# Patient Record
Sex: FEMALE | Race: WHITE | NOT HISPANIC OR LATINO | Employment: FULL TIME | URBAN - NONMETROPOLITAN AREA
[De-identification: names, ages, dates, MRNs, and addresses within clinical notes are randomized per-mention and may not be internally consistent; named-entity substitution may affect disease eponyms.]

---

## 2017-02-21 ENCOUNTER — OFFICE VISIT (OUTPATIENT)
Dept: OBGYN | Facility: OTHER | Age: 27
End: 2017-02-21
Attending: NURSE PRACTITIONER
Payer: COMMERCIAL

## 2017-02-21 VITALS
WEIGHT: 137 LBS | HEIGHT: 65 IN | DIASTOLIC BLOOD PRESSURE: 66 MMHG | HEART RATE: 88 BPM | OXYGEN SATURATION: 97 % | SYSTOLIC BLOOD PRESSURE: 114 MMHG | BODY MASS INDEX: 22.82 KG/M2

## 2017-02-21 DIAGNOSIS — Z30.42 ENCOUNTER FOR DEPO-PROVERA CONTRACEPTION: ICD-10-CM

## 2017-02-21 DIAGNOSIS — Z30.019 ENCOUNTER FOR INITIAL PRESCRIPTION OF CONTRACEPTIVES: ICD-10-CM

## 2017-02-21 DIAGNOSIS — Z30.019 ENCOUNTER FOR INITIAL PRESCRIPTION OF CONTRACEPTIVES: Primary | ICD-10-CM

## 2017-02-21 LAB — HCG UR QL: NEGATIVE

## 2017-02-21 PROCEDURE — 99212 OFFICE O/P EST SF 10 MIN: CPT | Performed by: COUNSELOR

## 2017-02-21 PROCEDURE — 81025 URINE PREGNANCY TEST: CPT | Performed by: NURSE PRACTITIONER

## 2017-02-21 PROCEDURE — 99212 OFFICE O/P EST SF 10 MIN: CPT | Performed by: NURSE PRACTITIONER

## 2017-02-21 RX ORDER — MEDROXYPROGESTERONE ACETATE 150 MG/ML
150 INJECTION, SUSPENSION INTRAMUSCULAR
Qty: 3 ML | Refills: 4 | OUTPATIENT
Start: 2017-02-21 | End: 2017-09-27

## 2017-02-21 ASSESSMENT — PAIN SCALES - GENERAL: PAINLEVEL: NO PAIN (0)

## 2017-02-21 NOTE — MR AVS SNAPSHOT
"              After Visit Summary   2/21/2017    Latasha Fisher    MRN: [id number]           Patient Information     Date Of Birth          1990        Visit Information        Provider Department      2/21/2017 10:15 AM Latosha Kathleen NP [facility]        Today's Diagnoses     Encounter for initial prescription of contraceptives    -  1    Encounter for Depo-Provera contraception          Care Instructions    Return in 90 days        Follow-ups after your visit        Follow-up notes from your care team     Return in about 3 months (around 5/21/2017).      Who to contact     If you have questions or need follow up information about today's clinic visit or your schedule please contact [facility] directly at [phone].  Normal or non-critical lab and imaging results will be communicated to you by MyChart, letter or phone within 4 business days after the clinic has received the results. If you do not hear from us within 7 days, please contact the clinic through MyChart or phone. If you have a critical or abnormal lab result, we will notify you by phone as soon as possible.  Submit refill requests through [employer] or call your pharmacy and they will forward the refill request to us. Please allow 3 business days for your refill to be completed.          Additional Information About Your Visit        MyChart Information     [employer] lets you send messages to your doctor, view your test results, renew your prescriptions, schedule appointments and more. To sign up, go to [email or web address] . Click on \"Log in\" on the left side of the screen, which will take you to the Welcome page. Then click on \"Sign up Now\" on the right side of the page.     You will be asked to enter the access code listed below, as well as some personal information. Please follow the directions to create your username and password.     Your access code is: [email or web address]  Expires: 5/22/2017 10:44 AM     Your access " "code will  in 90 days. If you need help or a new code, please call your [place] clinic or [phone].        Care EveryWhere ID     This is your Care EveryWhere ID. This could be used by other organizations to access your [place] medical records  [id number]        Your Vitals Were     Pulse Height Last Period Pulse Oximetry BMI (Body Mass Index)       88 5' 5\" (1.651 m) 2017 97% 22.8 kg/m2        Blood Pressure from Last 3 Encounters:   17 114/66   16 114/70   16 102/70    Weight from Last 3 Encounters:   17 137 lb (62.1 kg)   16 137 lb (62.1 kg)   16 130 lb (59 kg)                 Today's Medication Changes          These changes are accurate as of: 17 10:44 AM.  If you have any questions, ask your nurse or doctor.               Start taking these medicines.        Dose/Directions    medroxyPROGESTERone 150 MG/ML injection   Commonly known as:  DEPO-PROVERA   Used for:  Encounter for initial prescription of contraceptives   Started by:  Latosha Kathleen, NP        Dose:  150 mg   Inject 1 mL (150 mg) into the muscle every 3 months   Quantity:  3 mL   Refills:  4            Where to get your medicines      Some of these will need a paper prescription and others can be bought over the counter.  Ask your nurse if you have questions.     You don't need a prescription for these medications     medroxyPROGESTERone 150 MG/ML injection                Primary Care Provider Office Phone # Fax #    Maximus Kemp [phone]       [address] [address]        Thank you!     Thank you for choosing [facility]  for your care. Our goal is always to provide you with excellent care. Hearing back from our patients is one way we can continue to improve our services. Please take a few minutes to complete the written survey that you may receive in the mail after your visit with us. Thank you!             Your " Updated Medication List - Protect others around you: Learn how to safely use, store and throw away your medicines at www.disposemymeds.org.          This list is accurate as of: 2/21/17 10:44 AM.  Always use your most recent med list.                   Brand Name Dispense Instructions for use    albuterol 108 (90 BASE) MCG/ACT Inhaler    VENTOLIN HFA    1 Inhaler    Inhale 2 puffs into the lungs every 4 hours as needed for shortness of breath / dyspnea or wheezing INHALE TWO PUFFS INTO LUNGS EVERY 4 HOURS AS NEEDED FOR SHORTNESS OF BREATH, SHAKE BEFORE USING       medroxyPROGESTERone 150 MG/ML injection    DEPO-PROVERA    3 mL    Inject 1 mL (150 mg) into the muscle every 3 months

## 2017-02-21 NOTE — NURSING NOTE
The following medication was given:     MEDICATION: Depo Provera 150mg  ROUTE: IM  SITE: Deltoid - Right  DOSE: 1 mL  LOT #: [id number]  :  [employer]   EXPIRATION DATE:  7/31/2019  NDC#: [address]-8  Patient will return to the clinic for another injection between 5/9/2017-5/23/2017.    Amparo Espitia

## 2017-02-21 NOTE — PROGRESS NOTES
"[facility]   Latasha presents today requesting to go back on Depo provera shots.  She has a 13 month old juan miguel dn has been using condoms but would like something more long term.  Her urine hcg is negative today. She has used Depo in the past with good results.              Medications:     Current Outpatient Prescriptions Ordered in Epic   Medication     medroxyPROGESTERone (DEPO-PROVERA) 150 MG/ML injection     albuterol (VENTOLIN HFA) 108 (90 BASE) MCG/ACT inhaler     No current Epic-ordered facility-administered medications on file.                 Allergies:   Review of patient's allergies indicates no known allergies.         Review of Systems:   The 5 point Review of Systems is negative other than noted in the HPI                     Physical Exam:   Blood pressure 114/66, pulse 88, height 5' 5\" (1.651 m), weight 137 lb (62.1 kg), last menstrual period 01/28/2017, SpO2 97 %, not currently breastfeeding.  Constitutional:   awake, alert, cooperative, no apparent distress, and appears stated age              Data:   All laboratory data reviewed          Assessment and Plan:   Contraceptive initiation - She desired Depo-Provera and this was started today following negative urine Hcg.  Bleeding/fertility and bone loss side effects were reviewed, questions answered.  Instructed to return every 90 days for continued injections.  She is to call with any questions or concerns.      BRITNI Robison  2/21/2017  10:38 AM  "

## 2017-02-21 NOTE — NURSING NOTE
"Chief Complaint   Patient presents with     Contraception       Initial /66  Pulse 88  Ht 5' 5\" (1.651 m)  Wt 137 lb (62.1 kg)  LMP 01/28/2017  SpO2 97%  BMI 22.8 kg/m2 Estimated body mass index is 22.8 kg/(m^2) as calculated from the following:    Height as of this encounter: 5' 5\" (1.651 m).    Weight as of this encounter: 137 lb (62.1 kg).  Medication Reconciliation: complete     Amparo Espitia      "

## 2017-05-19 ENCOUNTER — ALLIED HEALTH/NURSE VISIT (OUTPATIENT)
Dept: ALLERGY | Facility: OTHER | Age: 27
End: 2017-05-19
Attending: FAMILY MEDICINE
Payer: MEDICAID

## 2017-05-19 DIAGNOSIS — Z30.9 CONTRACEPTIVE MANAGEMENT: Primary | ICD-10-CM

## 2017-05-19 PROCEDURE — 96372 THER/PROPH/DIAG INJ SC/IM: CPT

## 2017-05-19 NOTE — PROGRESS NOTES
Prior to injection verified patient identity using patient's name and date of birth.    Per orders of Dr Kemp, injection of Depo Provera given by Brenda Barahona. Patient instructed to remain in clinic for 20 minutes afterwards, and to report any adverse reaction to me immediately.    Patient signed out AMA and did not remain for observation.     The following medication was given:     MEDICATION: Depo Provera 150mg  ROUTE: IM  SITE: Deltoid - Left  DOSE: 150 mg  LOT #: [id number]  :  [employer]   EXPIRATION DATE:  12/2019  NDC#: [address]-5  Patient instructed to RTC for next injection Aug 4-Aug 18, 2017.  Brenda Barahona LPN

## 2017-05-19 NOTE — MR AVS SNAPSHOT
"              After Visit Summary   5/19/2017    Latasha Fisher    MRN: [id number]           Patient Information     Date Of Birth          1990        Visit Information        Provider Department      5/19/2017 10:30 AM HC SHOT ROOM [facility]        Today's Diagnoses     Contraceptive management    -  1       Follow-ups after your visit        Your next 10 appointments already scheduled     May 30, 2017  8:15 AM CDT   (Arrive by 8:00 AM)   SHORT with Maximus Kemp MD   [facility]    [address] [place] [address] [facility] [address]   [phone]              Who to contact     If you have questions or need follow up information about today's clinic visit or your schedule please contact [facility] directly at [phone].  Normal or non-critical lab and imaging results will be communicated to you by MyChart, letter or phone within 4 business days after the clinic has received the results. If you do not hear from us within 7 days, please contact the clinic through MyChart or phone. If you have a critical or abnormal lab result, we will notify you by phone as soon as possible.  Submit refill requests through [employer] or call your pharmacy and they will forward the refill request to us. Please allow 3 business days for your refill to be completed.          Additional Information About Your Visit        MyChart Information     [employer] lets you send messages to your doctor, view your test results, renew your prescriptions, schedule appointments and more. To sign up, go to [email or web address] . Click on \"Log in\" on the left side of the screen, which will take you to the Welcome page. Then click on \"Sign up Now\" on the right side of the page.     You will be asked to enter the access code listed below, as well as some personal information. Please follow the directions to create your username and password.     Your access code is: [email or web address]  Expires: " 2017 11:44 AM     Your access code will  in 90 days. If you need help or a new code, please call your [place] clinic or [phone].        Care EveryWhere ID     This is your Care EveryWhere ID. This could be used by other organizations to access your [place] medical records  [id number]         Blood Pressure from Last 3 Encounters:   17 114/66   16 114/70   16 102/70    Weight from Last 3 Encounters:   17 137 lb (62.1 kg)   16 137 lb (62.1 kg)   16 130 lb (59 kg)              We Performed the Following     INJECTION INTRAMUSCULAR OR SUB-Q     Medroxyprogesterone inj  1mg   (Depo Provera J-Code)        Primary Care Provider Office Phone # Fax #    Maximus Kemp [phone]       [address] [address]        Thank you!     Thank you for choosing [facility]  for your care. Our goal is always to provide you with excellent care. Hearing back from our patients is one way we can continue to improve our services. Please take a few minutes to complete the written survey that you may receive in the mail after your visit with us. Thank you!             Your Updated Medication List - Protect others around you: Learn how to safely use, store and throw away your medicines at www.disposemymeds.org.          This list is accurate as of: 17 10:43 AM.  Always use your most recent med list.                   Brand Name Dispense Instructions for use    albuterol 108 (90 BASE) MCG/ACT Inhaler    VENTOLIN HFA    1 Inhaler    Inhale 2 puffs into the lungs every 4 hours as needed for shortness of breath / dyspnea or wheezing INHALE TWO PUFFS INTO LUNGS EVERY 4 HOURS AS NEEDED FOR SHORTNESS OF BREATH, SHAKE BEFORE USING       medroxyPROGESTERone 150 MG/ML injection    DEPO-PROVERA    3 mL    Inject 1 mL (150 mg) into the muscle every 3 months

## 2017-06-12 ENCOUNTER — OFFICE VISIT (OUTPATIENT)
Dept: FAMILY MEDICINE | Facility: OTHER | Age: 27
End: 2017-06-12
Attending: FAMILY MEDICINE
Payer: MEDICAID

## 2017-06-12 VITALS
RESPIRATION RATE: 16 BRPM | WEIGHT: 128.8 LBS | DIASTOLIC BLOOD PRESSURE: 68 MMHG | HEIGHT: 65 IN | OXYGEN SATURATION: 98 % | BODY MASS INDEX: 21.46 KG/M2 | TEMPERATURE: 99.3 F | SYSTOLIC BLOOD PRESSURE: 105 MMHG | HEART RATE: 98 BPM

## 2017-06-12 DIAGNOSIS — N94.10 DYSPAREUNIA, FEMALE: Primary | ICD-10-CM

## 2017-06-12 LAB
MICRO REPORT STATUS: NORMAL
SPECIMEN SOURCE: NORMAL
WET PREP SPEC: NORMAL

## 2017-06-12 PROCEDURE — 87210 SMEAR WET MOUNT SALINE/INK: CPT | Mod: ZL | Performed by: FAMILY MEDICINE

## 2017-06-12 PROCEDURE — 99213 OFFICE O/P EST LOW 20 MIN: CPT | Performed by: FAMILY MEDICINE

## 2017-06-12 PROCEDURE — 99212 OFFICE O/P EST SF 10 MIN: CPT | Performed by: FAMILY MEDICINE

## 2017-06-12 ASSESSMENT — PAIN SCALES - GENERAL: PAINLEVEL: EXTREME PAIN (8)

## 2017-06-12 NOTE — PROGRESS NOTES
Latasha MARVIN Phillip    June 12, 2017    Chief Complaint   Patient presents with     Vaginal Problem     Pt is in to discuss episiotomy scarring. Pt is having pain.       SUBJECTIVE:  Here for dyspareunia.  She has had 2 children, first with episiotomy and second with just a tear per her report.  Since the second child, she has dyspareunia.  It feels like it is tearing all the way down to her anus.  She has had some tearing and bleeding and is now afraid.  No sex for 2 weeks because of this.  Wants referral.  No other sx.      Past Medical History:   Diagnosis Date     Acute pancreatitis child    post treuma, from a fall     Hepatitis remote    transient, probably from alcohol     Scoliosis      Tobacco abuse        Past Surgical History:   Procedure Laterality Date     KNEE SURGERY       LAPAROSCOPIC APPENDECTOMY  4/23/2013    Procedure: LAPAROSCOPIC APPENDECTOMY;  LAPAROSCOPIC APPENDECTOMY;  Surgeon: Paula Roberts MD;  Location: [facility] OR       Current Outpatient Prescriptions   Medication Sig Dispense Refill     medroxyPROGESTERone (DEPO-PROVERA) 150 MG/ML injection Inject 1 mL (150 mg) into the muscle every 3 months 3 mL 4     albuterol (VENTOLIN HFA) 108 (90 BASE) MCG/ACT inhaler Inhale 2 puffs into the lungs every 4 hours as needed for shortness of breath / dyspnea or wheezing INHALE TWO PUFFS INTO LUNGS EVERY 4 HOURS AS NEEDED FOR SHORTNESS OF BREATH, SHAKE BEFORE USING 1 Inhaler 0       No Known Allergies    Family History   Problem Relation Age of Onset     Thyroid Disease Mother      DIABETES Father      Asthma Sister      Attention Deficit Disorder Brother        Social History     Social History     Marital status: Single     Spouse name: N/A     Number of children: 1     Years of education: 14     Occupational History      [employer]     Social History Main Topics     Smoking status: Current Every Day Smoker     Packs/day: 0.50     Years: 4.00     Smokeless tobacco: Never Used       Comment: Trying to quit     Alcohol use No      Comment: occasionally when she goes out' past hx heavier consumption     Drug use: No     Sexual activity: Not Currently     Partners: Male     Birth control/ protection: Injection     Other Topics Concern      Service No     Blood Transfusions Yes     Caffeine Concern Yes     1/day -- occ     Exercise No     Seat Belt Yes     Social History Narrative       5 point ROS negative except as noted above in HPI, including Gen., Resp., CV, GI &  system review.     OBJECTIVE:  B/P: 105/68, T: 99.3, P: 98, R: 16    GENERAL APPEARANCE: Alert, no acute distress  Genitalia:  Normal external genitalia.  I did not do speculum exam.  Some scarring in the inferior vagina down onto the perineum.  Normal otherwise.  KOH of external vagina obtained with a Q tip.    SKIN: no suspicious lesions or rashes to visualized skin  NEURO: Alert, oriented x 3, speech and mentation normal    ASSESSMENT and PLAN:  (N94.10) Dyspareunia, female  (primary encounter diagnosis)  Comment: wondering about old episiotomy  Plan: Wet prep, OB/GYN REFERRAL        Discussed.  She has this old scar, and it has torn with intercourse as well.  Painful.  R/o vaginitis and ask gyn to see to discuss options.  Prefers [facility] so set up.  F/u with ongoing concerns.

## 2017-06-12 NOTE — NURSING NOTE
"Chief Complaint   Patient presents with     Vaginal Problem     Pt is in to discuss episiotomy scarring. Pt is having pain.       Initial /68 (BP Location: Right arm, Patient Position: Chair, Cuff Size: Adult Regular)  Pulse 98  Temp 99.3  F (37.4  C) (Tympanic)  Resp 16  Ht 5' 5\" (1.651 m)  Wt 128 lb 12.8 oz (58.4 kg)  SpO2 98%  BMI 21.43 kg/m2 Estimated body mass index is 21.43 kg/(m^2) as calculated from the following:    Height as of this encounter: 5' 5\" (1.651 m).    Weight as of this encounter: 128 lb 12.8 oz (58.4 kg).  Medication Reconciliation: complete   Ania Benitez    "

## 2017-06-12 NOTE — MR AVS SNAPSHOT
After Visit Summary   6/12/2017    Latasha Fisher    MRN: [id number]           Patient Information     Date Of Birth          1990        Visit Information        Provider Department      6/12/2017 3:30 PM Maximus Kemp MD [facility]        Today's Diagnoses     Dyspareunia, female    -  1      Care Instructions    F/u with ongoing concerns.           Follow-ups after your visit        Additional Services     OB/GYN REFERRAL       Your provider has referred you to:  [facility]    Please be aware that coverage of these services is subject to the terms and limitations of your health insurance plan.  Call member services at your health plan with any benefit or coverage questions.      Please bring the following with you to your appointment:    (1) Any X-Rays, CTs or MRIs which have been performed.  Contact the facility where they were done to arrange for  prior to your scheduled appointment.   (2) List of current medications   (3) This referral request   (4) Any documents/labs given to you for this referral                  Who to contact     If you have questions or need follow up information about today's clinic visit or your schedule please contact [facility] directly at [phone].  Normal or non-critical lab and imaging results will be communicated to you by MyChart, letter or phone within 4 business days after the clinic has received the results. If you do not hear from us within 7 days, please contact the clinic through [employer] or phone. If you have a critical or abnormal lab result, we will notify you by phone as soon as possible.  Submit refill requests through [employer] or call your pharmacy and they will forward the refill request to us. Please allow 3 business days for your refill to be completed.          Additional Information About Your Visit        [employer] Information     [employer] lets you send messages to your doctor, view your test  "results, renew your prescriptions, schedule appointments and more. To sign up, go to [email or web address] . Click on \"Log in\" on the left side of the screen, which will take you to the Welcome page. Then click on \"Sign up Now\" on the right side of the page.     You will be asked to enter the access code listed below, as well as some personal information. Please follow the directions to create your username and password.     Your access code is: [email or web address]  Expires: 9/10/2017  3:53 PM     Your access code will  in 90 days. If you need help or a new code, please call your [facility] or [phone].        Care EveryWhere ID     This is your Care EveryWhere ID. This could be used by other organizations to access your [place] medical records  [id number]        Your Vitals Were     Pulse Temperature Respirations Height Pulse Oximetry BMI (Body Mass Index)    98 99.3  F (37.4  C) (Tympanic) 16 5' 5\" (1.651 m) 98% 21.43 kg/m2       Blood Pressure from Last 3 Encounters:   17 105/68   17 114/66   16 114/70    Weight from Last 3 Encounters:   17 128 lb 12.8 oz (58.4 kg)   17 137 lb (62.1 kg)   16 137 lb (62.1 kg)              We Performed the Following     OB/GYN REFERRAL     Wet prep        Primary Care Provider Office Phone # Fax #    Maximus Kemp [phone]       [address] [address]        Thank you!     Thank you for choosing [facility]  for your care. Our goal is always to provide you with excellent care. Hearing back from our patients is one way we can continue to improve our services. Please take a few minutes to complete the written survey that you may receive in the mail after your visit with us. Thank you!             Your Updated Medication List - Protect others around you: Learn how to safely use, store and throw away your medicines at www.disposemymeds.org.          This list is " accurate as of: 6/12/17  3:53 PM.  Always use your most recent med list.                   Brand Name Dispense Instructions for use    albuterol 108 (90 BASE) MCG/ACT Inhaler    VENTOLIN HFA    1 Inhaler    Inhale 2 puffs into the lungs every 4 hours as needed for shortness of breath / dyspnea or wheezing INHALE TWO PUFFS INTO LUNGS EVERY 4 HOURS AS NEEDED FOR SHORTNESS OF BREATH, SHAKE BEFORE USING       medroxyPROGESTERone 150 MG/ML injection    DEPO-PROVERA    3 mL    Inject 1 mL (150 mg) into the muscle every 3 months

## 2017-07-05 ENCOUNTER — HOSPITAL ENCOUNTER (EMERGENCY)
Facility: HOSPITAL | Age: 27
Discharge: HOME | End: 2017-07-05
Payer: MEDICAID

## 2017-07-05 VITALS
TEMPERATURE: 100.7 F | OXYGEN SATURATION: 96 % | HEART RATE: 104 BPM | SYSTOLIC BLOOD PRESSURE: 110 MMHG | RESPIRATION RATE: 16 BRPM | DIASTOLIC BLOOD PRESSURE: 75 MMHG

## 2017-07-05 DIAGNOSIS — J02.0 ACUTE STREPTOCOCCAL PHARYNGITIS: ICD-10-CM

## 2017-07-05 LAB
DEPRECATED S PYO AG THROAT QL EIA: ABNORMAL
MICRO REPORT STATUS: ABNORMAL
SPECIMEN SOURCE: ABNORMAL

## 2017-07-05 PROCEDURE — 87880 STREP A ASSAY W/OPTIC: CPT | Performed by: FAMILY MEDICINE

## 2017-07-05 PROCEDURE — 25000128 H RX IP 250 OP 636

## 2017-07-05 PROCEDURE — 99213 OFFICE O/P EST LOW 20 MIN: CPT

## 2017-07-05 PROCEDURE — 96372 THER/PROPH/DIAG INJ SC/IM: CPT

## 2017-07-05 PROCEDURE — 99214 OFFICE O/P EST MOD 30 MIN: CPT | Mod: 25

## 2017-07-05 RX ADMIN — PENICILLIN G BENZATHINE 1.2 MILLION UNITS: 1200000 INJECTION, SUSPENSION INTRAMUSCULAR at 20:51

## 2017-07-05 NOTE — ED AVS SNAPSHOT
[facility]    [address] [address]    Phone:  [phone]                                       Latasha Fisher   MRN: [id number]    Department:  [facility]   Date of Visit:  7/5/2017           After Visit Summary Signature Page     I have received my discharge instructions, and my questions have been answered. I have discussed any challenges I see with this plan with the nurse or doctor.    ..........................................................................................................................................  Patient/Patient Representative Signature      ..........................................................................................................................................  Patient Representative Print Name and Relationship to Patient    ..................................................               ................................................  Date                                            Time    ..........................................................................................................................................  Reviewed by Signature/Title    ...................................................              ..............................................  Date                                                            Time

## 2017-07-05 NOTE — ED AVS SNAPSHOT
[facility]    [address]    [facility] [address]    Phone:  [phone]                                       Latasha Fisher   MRN: [id number]    Department:  [facility]   Date of Visit:  7/5/2017           Patient Information     Date Of Birth          1990        Your diagnoses for this visit were:     Acute streptococcal pharyngitis        You were seen by Nataliya Cortes APRN FNP.      Follow-up Information     Follow up with Maximus Kemp MD In 3 days.    Specialty:  Family Practice    Why:  if not improving or back to baseline    Contact information:    [facility]  [address] [place] [address] [phone]          Follow up with [facility].    Specialty:  EMERGENCY MEDICINE    Why:  As needed, If symptoms worsen    Contact information:    [address]  [facility] [address] [phone]    Additional information:    From [place]: Take [address]. Turn left at [address]. Turn left at the first stoplight on [address]. At the first stop sign, take a right onto [address]. Take a left into the parking lot and continue through until you reach the North enterance of the building.       From [place]: Take [address]. Take the [address] ramp towards [place]. Turn left onto [address]. Take a slight right onto [address]. Turn left at the first stoplight on [address]. At the first stop sign, take a right onto [address]. Take a left into the parking lot and continue through until you reach the North enterance of the building.       From [place]: Take [address]. Take a right at [address]. At the first stop sign, take a right onto [address]. Take a left into the parking lot and continue through until you reach the North enterance of the building.         Discharge Instructions         See attached for home care  Rest, increase  fluids  Tylenol, ibuprofen for pain, fever  Gargle with salt water  Throw away tooth brushes  F/U with PCP if not improving or back to base line in 1 week  Return to  with worsening symptoms.       Pharyngitis: Strep (Confirmed)    You have had a positive test for strep throat. Strep throat is a contagious illness. It is spread by coughing, kissing or by touching others after touching your mouth or nose. Symptoms include throat pain that is worse with swallowing, aching all over, headache, and fever. It is treated with antibiotic medicine. This should help you start to feel better in 1 to 2 days.  Home care    Rest at home. Drink plenty of fluids to you won't get dehydrated.    No work or school for the first 2 days of taking the antibiotics. After this time, you will not be contagious. You can then return to school or work if you are feeling better.     Take antibiotic medicine for the full 10 days, even if you feel better. This is very important to ensure the infection is treated. It is also important to prevent medicine-resistant germs from developing. If you were given an antibiotic shot, you don't need any more antibiotics.    You may use acetaminophen or ibuprofen to control pain or fever, unless another medicine was prescribed for this. Talk with your doctor before taking these medicines if you have chronic liver or kidney disease. Also talk with your doctor if you have had a stomach ulcer or GI bleeding.    Throat lozenges or sprays help reduce pain. Gargling with warm saltwater will also reduce throat pain. Dissolve 1/2 teaspoon of salt in 1 glass of warm water. This may be useful just before meals.     Soft foods are OK. Avoid salty or spicy foods.  Follow-up care  Follow up with your healthcare provider or our staff if you don't get better over the next week.  When to seek medical advice  Call your healthcare provider right away if any of these occur:    Fever of 100.4 F (38 C) or higher, or as directed  by your healthcare provider    New or worsening ear pain, sinus pain, or headache    Painful lumps in the back of neck    Stiff neck    Lymph nodes getting larger or becoming soft in the middle    You can't swallow liquids or you can't open your mouth wide because of throat pain    Signs of dehydration. These include very dark urine or no urine, sunken eyes, and dizziness.    Trouble breathing or noisy breathing    Muffled voice    Rash  Date Last Reviewed: 4/13/2015 2000-2017 [employer]. [address] [place] [address]. All rights reserved. This information is not intended as a substitute for professional medical care. Always follow your healthcare professional's instructions.             Review of your medicines      Our records show that you are taking the medicines listed below. If these are incorrect, please call your family doctor or clinic.        Dose / Directions Last dose taken    albuterol 108 (90 BASE) MCG/ACT Inhaler   Commonly known as:  VENTOLIN HFA   Dose:  2 puff   Quantity:  1 Inhaler        Inhale 2 puffs into the lungs every 4 hours as needed for shortness of breath / dyspnea or wheezing INHALE TWO PUFFS INTO LUNGS EVERY 4 HOURS AS NEEDED FOR SHORTNESS OF BREATH, SHAKE BEFORE USING   Refills:  0        medroxyPROGESTERone 150 MG/ML injection   Commonly known as:  DEPO-PROVERA   Dose:  150 mg   Quantity:  3 mL        Inject 1 mL (150 mg) into the muscle every 3 months   Refills:  4                Procedures and tests performed during your visit     Rapid strep screen      Orders Needing Specimen Collection     None      Pending Results     No orders found from 7/3/2017 to 7/6/2017.            Pending Culture Results     No orders found from 7/3/2017 to 7/6/2017.            Thank you for choosing [facility]       Thank you for choosing [facility] for your care. Our goal is always to provide you with excellent care. Hearing back from our patients is one way we can continue to  "improve our services. Please take a few minutes to complete the written survey that you may receive in the mail after you visit with us. Thank you!        [employer] Information     [employer] lets you send messages to your doctor, view your test results, renew your prescriptions, schedule appointments and more. To sign up, go to [email or web address] . Click on \"Log in\" on the left side of the screen, which will take you to the Welcome page. Then click on \"Sign up Now\" on the right side of the page.     You will be asked to enter the access code listed below, as well as some personal information. Please follow the directions to create your username and password.     Your access code is: [email or web address]  Expires: 9/10/2017  3:53 PM     Your access code will  in 90 days. If you need help or a new code, please call your [place] clinic or [phone].        Care EveryWhere ID     This is your Care EveryWhere ID. This could be used by other organizations to access your [facility] medical records  [id number]        Equal Access to Services     [facility]: Hadii addie Romo, waruben cordero, qaybsayra malonealtash berry, annie elizabeth . So [facility] [phone].    ATENCIÓN: Si habla español, tiene a rodriguez disposición servicios gratuitos de asistencia lingüística. Timmy al [phone].    We comply with applicable federal civil rights laws and [place] laws. We do not discriminate on the basis of race, color, national origin, age, disability sex, sexual orientation or gender identity.            After Visit Summary       This is your record. Keep this with you and show to your community pharmacist(s) and doctor(s) at your next visit.                  "

## 2017-07-06 NOTE — ED NOTES
Pt presents today with significant other and 2 sons for c/o fever, sore throat, cough and right ear pain, sore throat stared today but the other s/s have been going on for about 5 days.

## 2017-07-06 NOTE — ED PROVIDER NOTES
History     Chief Complaint   Patient presents with     Generalized Body Aches     for 5 days     Otalgia     Cough     Pharyngitis     todqay     The history is provided by the patient. No  was used.     Latasha Fisher is a 26 year old female presents to  for evaluation of the above stated complaints. Onset of sx 4-5 days ago, worsening today, associated with new sore throat and fever. Has been using OTC ibuprofen.     I have reviewed the Medications, Allergies, Past Medical and Surgical History, and Social History in the Epic system.    Review of Systems   Constitutional: Positive for activity change, appetite change and fever.   HENT: Positive for ear pain and sore throat. Negative for congestion.    Eyes: Negative for redness.   Respiratory: Positive for cough. Negative for shortness of breath.    Cardiovascular: Negative for chest pain.   Gastrointestinal: Negative for abdominal pain.   Genitourinary: Negative for difficulty urinating.   Musculoskeletal: Positive for arthralgias and myalgias. Negative for neck stiffness.   Skin: Negative for color change.   Neurological: Negative for headaches.   Psychiatric/Behavioral: Negative for confusion.     Physical Exam      Physical Exam   Constitutional: She is oriented to person, place, and time. No distress.   HENT:   Head: Normocephalic.   Right Ear: Tympanic membrane normal.   Left Ear: Tympanic membrane normal.   Nose: Nose normal.   Mouth/Throat: Uvula is midline and mucous membranes are normal. Posterior oropharyngeal edema and posterior oropharyngeal erythema present.   Eyes: Conjunctivae are normal.   Cardiovascular: Regular rhythm, normal heart sounds and normal pulses.  Tachycardia present.    Pulmonary/Chest: Effort normal and breath sounds normal. No respiratory distress.   Abdominal: Soft. There is no tenderness.   Musculoskeletal: Normal range of motion.   Lymphadenopathy:     She has cervical adenopathy.   Neurological: She is  alert and oriented to person, place, and time.   Skin: Skin is warm. She is not diaphoretic.   Nursing note and vitals reviewed.     ED Course     Procedures        Labs Ordered and Resulted from Time of ED Arrival Up to the Time of Departure from the ED   RAPID STREP SCREEN - Abnormal; Notable for the following:        Result Value    Rapid Strep A Screen   (*)     Value: POSITIVE: Group A Streptococcal antigen detected by immunoassay.    All other components within normal limits       Assessments & Plan (with Medical Decision Making)   Pt presents with cough, ear pain, sore throat, fever. Strep is positive. IM bicillin given with epic discharge instructions. Pt verbalizes understanding and agrees with plan.    I have reviewed the nursing notes.    I have reviewed the findings, diagnosis, plan and need for follow up with the patient.    New Prescriptions    No medications on file       Final diagnoses:   Acute streptococcal pharyngitis     See attached for home care  Rest, increase fluids  Tylenol, ibuprofen for pain, fever  Gargle with salt water  Throw away tooth brushes  F/U with PCP if not improving or back to base line in 1 week  Return to  with worsening symptoms.   7/5/2017   [facility]     Nataliya Cortes APRN FNP  07/24/17 1017

## 2017-07-06 NOTE — DISCHARGE INSTRUCTIONS
See attached for home care  Rest, increase fluids  Tylenol, ibuprofen for pain, fever  Gargle with salt water  Throw away tooth brushes  F/U with PCP if not improving or back to base line in 1 week  Return to  with worsening symptoms.       Pharyngitis: Strep (Confirmed)    You have had a positive test for strep throat. Strep throat is a contagious illness. It is spread by coughing, kissing or by touching others after touching your mouth or nose. Symptoms include throat pain that is worse with swallowing, aching all over, headache, and fever. It is treated with antibiotic medicine. This should help you start to feel better in 1 to 2 days.  Home care    Rest at home. Drink plenty of fluids to you won't get dehydrated.    No work or school for the first 2 days of taking the antibiotics. After this time, you will not be contagious. You can then return to school or work if you are feeling better.     Take antibiotic medicine for the full 10 days, even if you feel better. This is very important to ensure the infection is treated. It is also important to prevent medicine-resistant germs from developing. If you were given an antibiotic shot, you don't need any more antibiotics.    You may use acetaminophen or ibuprofen to control pain or fever, unless another medicine was prescribed for this. Talk with your doctor before taking these medicines if you have chronic liver or kidney disease. Also talk with your doctor if you have had a stomach ulcer or GI bleeding.    Throat lozenges or sprays help reduce pain. Gargling with warm saltwater will also reduce throat pain. Dissolve 1/2 teaspoon of salt in 1 glass of warm water. This may be useful just before meals.     Soft foods are OK. Avoid salty or spicy foods.  Follow-up care  Follow up with your healthcare provider or our staff if you don't get better over the next week.  When to seek medical advice  Call your healthcare provider right away if any of these  occur:    Fever of 100.4 F (38 C) or higher, or as directed by your healthcare provider    New or worsening ear pain, sinus pain, or headache    Painful lumps in the back of neck    Stiff neck    Lymph nodes getting larger or becoming soft in the middle    You can't swallow liquids or you can't open your mouth wide because of throat pain    Signs of dehydration. These include very dark urine or no urine, sunken eyes, and dizziness.    Trouble breathing or noisy breathing    Muffled voice    Rash  Date Last Reviewed: 4/13/2015 2000-2017 [employer]. [address] [place] [address]. All rights reserved. This information is not intended as a substitute for professional medical care. Always follow your healthcare professional's instructions.

## 2017-07-07 ENCOUNTER — HOSPITAL ENCOUNTER (EMERGENCY)
Facility: HOSPITAL | Age: 27
Discharge: HOME | End: 2017-07-07
Attending: INTERNAL MEDICINE | Admitting: INTERNAL MEDICINE
Payer: MEDICAID

## 2017-07-07 VITALS
DIASTOLIC BLOOD PRESSURE: 73 MMHG | TEMPERATURE: 98.6 F | HEART RATE: 80 BPM | OXYGEN SATURATION: 96 % | SYSTOLIC BLOOD PRESSURE: 102 MMHG | RESPIRATION RATE: 16 BRPM

## 2017-07-07 DIAGNOSIS — J02.0 STREP THROAT: ICD-10-CM

## 2017-07-07 PROCEDURE — 25000132 ZZH RX MED GY IP 250 OP 250 PS 637: Performed by: INTERNAL MEDICINE

## 2017-07-07 PROCEDURE — 99283 EMERGENCY DEPT VISIT LOW MDM: CPT

## 2017-07-07 PROCEDURE — 99283 EMERGENCY DEPT VISIT LOW MDM: CPT | Performed by: INTERNAL MEDICINE

## 2017-07-07 RX ADMIN — AMOXICILLIN AND CLAVULANATE POTASSIUM 1 TABLET: 875; 125 TABLET, FILM COATED ORAL at 05:38

## 2017-07-07 NOTE — ED NOTES
Discharge instructions completed with patient with no questions or concerns. Written prescription sent with patient.

## 2017-07-07 NOTE — ED NOTES
"Patient presents with complaint of pharyngitis. Patient was seen here on Wednesday and had a positive strep test. Patient states \"I was given a shot of antibiotic.\" This morning patient states her throat pain is 8/10 and she does not think she is getting better. Also states ear/head pressure. Patient reports fevers at home and has been taking Ibuprofen at home to help with the pain/swelling. Call light within reach.   "

## 2017-07-07 NOTE — DISCHARGE INSTRUCTIONS
Pharyngitis: Strep (Confirmed)    You have had a positive test for strep throat. Strep throat is a contagious illness. It is spread by coughing, kissing or by touching others after touching your mouth or nose. Symptoms include throat pain that is worse with swallowing, aching all over, headache, and fever. It is treated with antibiotic medicine. This should help you start to feel better in 1 to 2 days.  Home care    Rest at home. Drink plenty of fluids to you won't get dehydrated.    No work or school for the first 2 days of taking the antibiotics. After this time, you will not be contagious. You can then return to school or work if you are feeling better.     Take antibiotic medicine for the full 10 days, even if you feel better. This is very important to ensure the infection is treated. It is also important to prevent medicine-resistant germs from developing. If you were given an antibiotic shot, you don't need any more antibiotics.    You may use acetaminophen or ibuprofen to control pain or fever, unless another medicine was prescribed for this. Talk with your doctor before taking these medicines if you have chronic liver or kidney disease. Also talk with your doctor if you have had a stomach ulcer or GI bleeding.    Throat lozenges or sprays help reduce pain. Gargling with warm saltwater will also reduce throat pain. Dissolve 1/2 teaspoon of salt in 1 glass of warm water. This may be useful just before meals.     Soft foods are OK. Avoid salty or spicy foods.  Follow-up care  Follow up with your healthcare provider or our staff if you don't get better over the next week.  When to seek medical advice  Call your healthcare provider right away if any of these occur:    Fever of 100.4 F (38 C) or higher, or as directed by your healthcare provider    New or worsening ear pain, sinus pain, or headache    Painful lumps in the back of neck    Stiff neck    Lymph nodes getting larger or becoming soft in the  middle    You can't swallow liquids or you can't open your mouth wide because of throat pain    Signs of dehydration. These include very dark urine or no urine, sunken eyes, and dizziness.    Trouble breathing or noisy breathing    Muffled voice    Rash  Date Last Reviewed: 4/13/2015 2000-2017 [employer]. [address] [place] [address], [place] [address]. All rights reserved. This information is not intended as a substitute for professional medical care. Always follow your healthcare professional's instructions.

## 2017-07-07 NOTE — ED AVS SNAPSHOT
[facility]    [address] [place] [address]    [place] [address]    Phone:  [phone]                                       Latasha Fisher   MRN: [id number]    Department:  [facility]   Date of Visit:  7/7/2017           Patient Information     Date Of Birth          1990        Your diagnoses for this visit were:     Strep throat        You were seen by Sy Madrigal MD.      Follow-up Information     Follow up with Maximus Kemp MD.    Specialty:  Family Practice    Contact information:    [facility]  [address] [place] [address]  [phone]          Discharge Instructions         Pharyngitis: Strep (Confirmed)    You have had a positive test for strep throat. Strep throat is a contagious illness. It is spread by coughing, kissing or by touching others after touching your mouth or nose. Symptoms include throat pain that is worse with swallowing, aching all over, headache, and fever. It is treated with antibiotic medicine. This should help you start to feel better in 1 to 2 days.  Home care    Rest at home. Drink plenty of fluids to you won't get dehydrated.    No work or school for the first 2 days of taking the antibiotics. After this time, you will not be contagious. You can then return to school or work if you are feeling better.     Take antibiotic medicine for the full 10 days, even if you feel better. This is very important to ensure the infection is treated. It is also important to prevent medicine-resistant germs from developing. If you were given an antibiotic shot, you don't need any more antibiotics.    You may use acetaminophen or ibuprofen to control pain or fever, unless another medicine was prescribed for this. Talk with your doctor before taking these medicines if you have chronic liver or kidney disease. Also talk with your doctor if you have had a stomach ulcer or GI bleeding.    Throat lozenges or sprays help reduce pain. Gargling with warm  saltwater will also reduce throat pain. Dissolve 1/2 teaspoon of salt in 1 glass of warm water. This may be useful just before meals.     Soft foods are OK. Avoid salty or spicy foods.  Follow-up care  Follow up with your healthcare provider or our staff if you don't get better over the next week.  When to seek medical advice  Call your healthcare provider right away if any of these occur:    Fever of 100.4 F (38 C) or higher, or as directed by your healthcare provider    New or worsening ear pain, sinus pain, or headache    Painful lumps in the back of neck    Stiff neck    Lymph nodes getting larger or becoming soft in the middle    You can't swallow liquids or you can't open your mouth wide because of throat pain    Signs of dehydration. These include very dark urine or no urine, sunken eyes, and dizziness.    Trouble breathing or noisy breathing    Muffled voice    Rash  Date Last Reviewed: 4/13/2015 2000-2017 [employer]. [address] [place] [address]. All rights reserved. This information is not intended as a substitute for professional medical care. Always follow your healthcare professional's instructions.             Review of your medicines      START taking        Dose / Directions Last dose taken    amoxicillin-clavulanate 875-125 MG per tablet   Commonly known as:  AUGMENTIN   Dose:  1 tablet   Quantity:  14 tablet        Take 1 tablet by mouth 2 times daily for 7 days   Refills:  0          Our records show that you are taking the medicines listed below. If these are incorrect, please call your family doctor or clinic.        Dose / Directions Last dose taken    albuterol 108 (90 BASE) MCG/ACT Inhaler   Commonly known as:  VENTOLIN HFA   Dose:  2 puff   Quantity:  1 Inhaler        Inhale 2 puffs into the lungs every 4 hours as needed for shortness of breath / dyspnea or wheezing INHALE TWO PUFFS INTO LUNGS EVERY 4 HOURS AS NEEDED FOR SHORTNESS OF BREATH, SHAKE BEFORE USING  "  Refills:  0        IBUPROFEN PO   Dose:  600 mg        Take 600 mg by mouth   Refills:  0        medroxyPROGESTERone 150 MG/ML injection   Commonly known as:  DEPO-PROVERA   Dose:  150 mg   Quantity:  3 mL        Inject 1 mL (150 mg) into the muscle every 3 months   Refills:  4                Prescriptions were sent or printed at these locations (1 Prescription)                   Other Prescriptions                Printed at Department/Unit printer (1 of 1)         amoxicillin-clavulanate (AUGMENTIN) 875-125 MG per tablet                Orders Needing Specimen Collection     None      Pending Results     No orders found from 2017 to 2017.            Pending Culture Results     No orders found from 2017 to 2017.            Thank you for choosing [facility]       Thank you for choosing [facility] for your care. Our goal is always to provide you with excellent care. Hearing back from our patients is one way we can continue to improve our services. Please take a few minutes to complete the written survey that you may receive in the mail after you visit with us. Thank you!        [employer] Information     [employer] lets you send messages to your doctor, view your test results, renew your prescriptions, schedule appointments and more. To sign up, go to [email or web address] . Click on \"Log in\" on the left side of the screen, which will take you to the Welcome page. Then click on \"Sign up Now\" on the right side of the page.     You will be asked to enter the access code listed below, as well as some personal information. Please follow the directions to create your username and password.     Your access code is: [email or web address]  Expires: 9/10/2017  3:53 PM     Your access code will  in 90 days. If you need help or a new code, please call your [facility] clinic or [phone].        Care EveryWhere ID     This is your Care EveryWhere ID. This could be used by other organizations to access your [facility] medical " records  [id number]        Equal Access to Services     [facility] : Sobia Romo, tobi cordero, annie srinivasan. So [facility] [phone].    ATENCIÓN: Si habla español, tiene a rodriguez disposición servicios gratuitos de asistencia lingüística. Llame al [phone].    We comply with applicable federal civil rights laws and [place] laws. We do not discriminate on the basis of race, color, national origin, age, disability sex, sexual orientation or gender identity.            After Visit Summary       This is your record. Keep this with you and show to your community pharmacist(s) and doctor(s) at your next visit.

## 2017-07-07 NOTE — ED AVS SNAPSHOT
[facility]    [address] [address]    Phone:  [phone]                                       Latasha Fisher   MRN: [id number]    Department:  [facility]   Date of Visit:  7/7/2017           After Visit Summary Signature Page     I have received my discharge instructions, and my questions have been answered. I have discussed any challenges I see with this plan with the nurse or doctor.    ..........................................................................................................................................  Patient/Patient Representative Signature      ..........................................................................................................................................  Patient Representative Print Name and Relationship to Patient    ..................................................               ................................................  Date                                            Time    ..........................................................................................................................................  Reviewed by Signature/Title    ...................................................              ..............................................  Date                                                            Time

## 2017-07-24 ASSESSMENT — ENCOUNTER SYMPTOMS
COUGH: 1
CONFUSION: 0
DIFFICULTY URINATING: 0
NECK STIFFNESS: 0
FEVER: 1
ARTHRALGIAS: 1
APPETITE CHANGE: 1
COLOR CHANGE: 0
SORE THROAT: 1
ACTIVITY CHANGE: 1
ABDOMINAL PAIN: 0
MYALGIAS: 1
SHORTNESS OF BREATH: 0
EYE REDNESS: 0
HEADACHES: 0

## 2017-07-29 ASSESSMENT — ENCOUNTER SYMPTOMS
BLOOD IN STOOL: 0
FLANK PAIN: 0
SHORTNESS OF BREATH: 0
CONFUSION: 0
MYALGIAS: 0
CHILLS: 0
DIZZINESS: 0
NAUSEA: 0
COLOR CHANGE: 0
CHEST TIGHTNESS: 0
COUGH: 0
WOUND: 0
ANAL BLEEDING: 0
NECK PAIN: 0
FEVER: 1
VOICE CHANGE: 0
ARTHRALGIAS: 0
ABDOMINAL PAIN: 0
ABDOMINAL DISTENTION: 0
NECK STIFFNESS: 0
SORE THROAT: 1
VOMITING: 0
BACK PAIN: 0
PALPITATIONS: 0
LIGHT-HEADEDNESS: 0
HEADACHES: 0
DYSURIA: 0
NUMBNESS: 0
DIAPHORESIS: 0
HEMATURIA: 0
WHEEZING: 0

## 2017-07-29 NOTE — ED PROVIDER NOTES
"  History     Chief Complaint   Patient presents with     Pharyngitis     Seen on Wednesday here, positive strep. Pt states \"It keeps getting worse and the pain goes up to my ears.      Patient is a 26 year old female presenting with sore throat. The history is provided by the patient.   Pharyngitis   Location:  Generalized  Quality:  Aching  Severity:  Moderate  Duration:  2 days  Timing:  Constant  Chronicity:  New  Associated symptoms: fever    Associated symptoms: no abdominal pain, no chest pain, no chills, no cough, no headaches, no neck stiffness, no rash, no shortness of breath and no voice change          I have reviewed the Medications, Allergies, Past Medical and Surgical History, and Social History in the Epic system.      Review of Systems   Constitutional: Positive for fever. Negative for chills and diaphoresis.   HENT: Positive for sore throat. Negative for voice change.    Eyes: Negative for visual disturbance.   Respiratory: Negative for cough, chest tightness, shortness of breath and wheezing.    Cardiovascular: Negative for chest pain, palpitations and leg swelling.   Gastrointestinal: Negative for abdominal distention, abdominal pain, anal bleeding, blood in stool, nausea and vomiting.   Genitourinary: Negative for decreased urine volume, dysuria, flank pain and hematuria.   Musculoskeletal: Negative for arthralgias, back pain, gait problem, myalgias, neck pain and neck stiffness.   Skin: Negative for color change, pallor, rash and wound.   Neurological: Negative for dizziness, syncope, light-headedness, numbness and headaches.   Psychiatric/Behavioral: Negative for confusion and suicidal ideas.       Physical Exam   BP: 102/73  Pulse: 80  Temp: 98.6  F (37  C)  Resp: 16  SpO2: 96 %  Physical Exam   Constitutional: She is oriented to person, place, and time. She appears well-developed and well-nourished.   HENT:   Head: Normocephalic and atraumatic.   Mouth/Throat: Uvula is midline. Oropharyngeal " exudate present.       Eyes: Conjunctivae are normal. Pupils are equal, round, and reactive to light.   Neck: Normal range of motion. Neck supple. No JVD present. No tracheal deviation present. No thyromegaly present.   Cardiovascular: Normal rate, regular rhythm, normal heart sounds and intact distal pulses.  Exam reveals no gallop and no friction rub.    No murmur heard.  Pulmonary/Chest: Effort normal and breath sounds normal. No stridor. No respiratory distress. She has no wheezes. She has no rales. She exhibits no tenderness.   Abdominal: Soft. Bowel sounds are normal. She exhibits no distension and no mass. There is no tenderness. There is no rebound and no guarding.   Musculoskeletal: Normal range of motion. She exhibits no edema or tenderness.   Lymphadenopathy:     She has no cervical adenopathy.   Neurological: She is alert and oriented to person, place, and time.   Skin: Skin is warm and dry. No rash noted. No erythema. No pallor.   Psychiatric: Her behavior is normal.   Nursing note and vitals reviewed.      ED Course     ED Course     Procedures                 Labs Ordered and Resulted from Time of ED Arrival Up to the Time of Departure from the ED - No data to display    Assessments & Plan (with Medical Decision Making)   Strep pharyngitis  Poor response to PCN LA  Augmentin added to get higher concentration of antibiotic  Fu with PCP  I have reviewed the nursing notes.    I have reviewed the findings, diagnosis, plan and need for follow up with the patient.      Discharge Medication List as of 7/7/2017  5:35 AM      START taking these medications    Details   amoxicillin-clavulanate (AUGMENTIN) 875-125 MG per tablet Take 1 tablet by mouth 2 times daily for 7 days, Disp-14 tablet, R-0, Local Print             Final diagnoses:   Strep throat       7/7/2017   [facility]     Sy Madrigal MD  07/29/17 0235

## 2017-09-27 ENCOUNTER — OFFICE VISIT (OUTPATIENT)
Dept: FAMILY MEDICINE | Facility: OTHER | Age: 27
End: 2017-09-27
Attending: FAMILY MEDICINE
Payer: COMMERCIAL

## 2017-09-27 ENCOUNTER — HOSPITAL ENCOUNTER (EMERGENCY)
Facility: HOSPITAL | Age: 27
Discharge: HOME | End: 2017-09-27
Attending: PHYSICIAN ASSISTANT | Admitting: PHYSICIAN ASSISTANT
Payer: COMMERCIAL

## 2017-09-27 VITALS
TEMPERATURE: 98 F | DIASTOLIC BLOOD PRESSURE: 79 MMHG | OXYGEN SATURATION: 97 % | SYSTOLIC BLOOD PRESSURE: 127 MMHG | RESPIRATION RATE: 20 BRPM | HEART RATE: 98 BPM

## 2017-09-27 VITALS
HEART RATE: 79 BPM | SYSTOLIC BLOOD PRESSURE: 104 MMHG | OXYGEN SATURATION: 98 % | WEIGHT: 136 LBS | HEIGHT: 66 IN | RESPIRATION RATE: 16 BRPM | DIASTOLIC BLOOD PRESSURE: 65 MMHG | TEMPERATURE: 98 F | BODY MASS INDEX: 21.86 KG/M2

## 2017-09-27 DIAGNOSIS — N92.0 MENORRHAGIA WITH REGULAR CYCLE: ICD-10-CM

## 2017-09-27 DIAGNOSIS — Z30.011 ENCOUNTER FOR INITIAL PRESCRIPTION OF CONTRACEPTIVE PILLS: Primary | ICD-10-CM

## 2017-09-27 DIAGNOSIS — R10.9 ABDOMINAL PAIN OF UNKNOWN ETIOLOGY: ICD-10-CM

## 2017-09-27 DIAGNOSIS — Z71.89 ACP (ADVANCE CARE PLANNING): ICD-10-CM

## 2017-09-27 DIAGNOSIS — Z72.0 TOBACCO ABUSE: ICD-10-CM

## 2017-09-27 PROBLEM — N94.11 INTROITAL DYSPAREUNIA: Status: ACTIVE | Noted: 2017-07-18

## 2017-09-27 LAB
ALBUMIN SERPL-MCNC: 3.7 G/DL (ref 3.4–5)
ALBUMIN UR-MCNC: NEGATIVE MG/DL
ALP SERPL-CCNC: 57 U/L (ref 40–150)
ALT SERPL W P-5'-P-CCNC: 16 U/L (ref 0–50)
ANION GAP SERPL CALCULATED.3IONS-SCNC: 3 MMOL/L (ref 3–14)
APPEARANCE UR: ABNORMAL
AST SERPL W P-5'-P-CCNC: 11 U/L (ref 0–45)
BACTERIA #/AREA URNS HPF: ABNORMAL /HPF
BASOPHILS # BLD AUTO: 0 10E9/L (ref 0–0.2)
BASOPHILS NFR BLD AUTO: 0.6 %
BILIRUB SERPL-MCNC: 1 MG/DL (ref 0.2–1.3)
BILIRUB UR QL STRIP: NEGATIVE
BUN SERPL-MCNC: 8 MG/DL (ref 7–30)
CALCIUM SERPL-MCNC: 8.4 MG/DL (ref 8.5–10.1)
CHLORIDE SERPL-SCNC: 108 MMOL/L (ref 94–109)
CO2 SERPL-SCNC: 28 MMOL/L (ref 20–32)
COLOR UR AUTO: YELLOW
CREAT SERPL-MCNC: 0.71 MG/DL (ref 0.52–1.04)
CRP SERPL-MCNC: 23.9 MG/L (ref 0–8)
DIFFERENTIAL METHOD BLD: NORMAL
EOSINOPHIL # BLD AUTO: 0.3 10E9/L (ref 0–0.7)
EOSINOPHIL NFR BLD AUTO: 5 %
ERYTHROCYTE [DISTWIDTH] IN BLOOD BY AUTOMATED COUNT: 12.7 % (ref 10–15)
GFR SERPL CREATININE-BSD FRML MDRD: >90 ML/MIN/1.7M2
GLUCOSE SERPL-MCNC: 87 MG/DL (ref 70–99)
GLUCOSE UR STRIP-MCNC: NEGATIVE MG/DL
HCG UR QL: NEGATIVE
HCG UR QL: NEGATIVE
HCT VFR BLD AUTO: 40.7 % (ref 35–47)
HGB BLD-MCNC: 14.1 G/DL (ref 11.7–15.7)
HGB UR QL STRIP: NEGATIVE
IMM GRANULOCYTES # BLD: 0 10E9/L (ref 0–0.4)
IMM GRANULOCYTES NFR BLD: 0.2 %
KETONES UR STRIP-MCNC: NEGATIVE MG/DL
LEUKOCYTE ESTERASE UR QL STRIP: NEGATIVE
LYMPHOCYTES # BLD AUTO: 2.1 10E9/L (ref 0.8–5.3)
LYMPHOCYTES NFR BLD AUTO: 31.2 %
MCH RBC QN AUTO: 31.7 PG (ref 26.5–33)
MCHC RBC AUTO-ENTMCNC: 34.6 G/DL (ref 31.5–36.5)
MCV RBC AUTO: 92 FL (ref 78–100)
MONOCYTES # BLD AUTO: 0.6 10E9/L (ref 0–1.3)
MONOCYTES NFR BLD AUTO: 8.3 %
MUCOUS THREADS #/AREA URNS LPF: PRESENT /LPF
NEUTROPHILS # BLD AUTO: 3.6 10E9/L (ref 1.6–8.3)
NEUTROPHILS NFR BLD AUTO: 54.7 %
NITRATE UR QL: NEGATIVE
NRBC # BLD AUTO: 0 10*3/UL
NRBC BLD AUTO-RTO: 0 /100
PH UR STRIP: 7 PH (ref 4.7–8)
PLATELET # BLD AUTO: 245 10E9/L (ref 150–450)
POTASSIUM SERPL-SCNC: 3.6 MMOL/L (ref 3.4–5.3)
PROT SERPL-MCNC: 6.7 G/DL (ref 6.8–8.8)
RBC # BLD AUTO: 4.45 10E12/L (ref 3.8–5.2)
RBC #/AREA URNS AUTO: 2 /HPF (ref 0–2)
SODIUM SERPL-SCNC: 139 MMOL/L (ref 133–144)
SOURCE: ABNORMAL
SP GR UR STRIP: 1.01 (ref 1–1.03)
SQUAMOUS #/AREA URNS AUTO: 6 /HPF (ref 0–1)
UROBILINOGEN UR STRIP-MCNC: 2 MG/DL (ref 0–2)
WBC # BLD AUTO: 6.6 10E9/L (ref 4–11)
WBC #/AREA URNS AUTO: 1 /HPF (ref 0–2)

## 2017-09-27 PROCEDURE — 85025 COMPLETE CBC W/AUTO DIFF WBC: CPT | Performed by: PHYSICIAN ASSISTANT

## 2017-09-27 PROCEDURE — 36415 COLL VENOUS BLD VENIPUNCTURE: CPT | Performed by: PHYSICIAN ASSISTANT

## 2017-09-27 PROCEDURE — 99284 EMERGENCY DEPT VISIT MOD MDM: CPT | Performed by: PHYSICIAN ASSISTANT

## 2017-09-27 PROCEDURE — 99283 EMERGENCY DEPT VISIT LOW MDM: CPT | Mod: 27

## 2017-09-27 PROCEDURE — 81001 URINALYSIS AUTO W/SCOPE: CPT | Performed by: PHYSICIAN ASSISTANT

## 2017-09-27 PROCEDURE — 86140 C-REACTIVE PROTEIN: CPT | Performed by: PHYSICIAN ASSISTANT

## 2017-09-27 PROCEDURE — 81025 URINE PREGNANCY TEST: CPT | Mod: 59 | Performed by: PHYSICIAN ASSISTANT

## 2017-09-27 PROCEDURE — 99213 OFFICE O/P EST LOW 20 MIN: CPT | Performed by: FAMILY MEDICINE

## 2017-09-27 PROCEDURE — 80053 COMPREHEN METABOLIC PANEL: CPT | Performed by: PHYSICIAN ASSISTANT

## 2017-09-27 PROCEDURE — 81025 URINE PREGNANCY TEST: CPT | Mod: ZL | Performed by: FAMILY MEDICINE

## 2017-09-27 PROCEDURE — 99212 OFFICE O/P EST SF 10 MIN: CPT

## 2017-09-27 RX ORDER — LEVONORGESTREL AND ETHINYL ESTRADIOL 0.15-0.03
1 KIT ORAL DAILY
Qty: 91 TABLET | Refills: 3 | Status: SHIPPED | OUTPATIENT
Start: 2017-09-27 | End: 2018-09-10

## 2017-09-27 RX ORDER — KETOROLAC TROMETHAMINE 10 MG/1
10 TABLET, FILM COATED ORAL EVERY 6 HOURS PRN
Qty: 20 TABLET | Refills: 0 | Status: SHIPPED | OUTPATIENT
Start: 2017-09-27 | End: 2017-12-27

## 2017-09-27 ASSESSMENT — ENCOUNTER SYMPTOMS
CHILLS: 0
DIARRHEA: 0
VOMITING: 0
FEVER: 0
ABDOMINAL PAIN: 1
ABDOMINAL DISTENTION: 0
FREQUENCY: 0
DYSURIA: 0
NAUSEA: 0

## 2017-09-27 ASSESSMENT — ANXIETY QUESTIONNAIRES
2. NOT BEING ABLE TO STOP OR CONTROL WORRYING: SEVERAL DAYS
6. BECOMING EASILY ANNOYED OR IRRITABLE: SEVERAL DAYS
5. BEING SO RESTLESS THAT IT IS HARD TO SIT STILL: NOT AT ALL
7. FEELING AFRAID AS IF SOMETHING AWFUL MIGHT HAPPEN: NOT AT ALL
1. FEELING NERVOUS, ANXIOUS, OR ON EDGE: MORE THAN HALF THE DAYS
IF YOU CHECKED OFF ANY PROBLEMS ON THIS QUESTIONNAIRE, HOW DIFFICULT HAVE THESE PROBLEMS MADE IT FOR YOU TO DO YOUR WORK, TAKE CARE OF THINGS AT HOME, OR GET ALONG WITH OTHER PEOPLE: SOMEWHAT DIFFICULT
3. WORRYING TOO MUCH ABOUT DIFFERENT THINGS: MORE THAN HALF THE DAYS
GAD7 TOTAL SCORE: 8

## 2017-09-27 ASSESSMENT — PATIENT HEALTH QUESTIONNAIRE - PHQ9
SUM OF ALL RESPONSES TO PHQ QUESTIONS 1-9: 2
5. POOR APPETITE OR OVEREATING: MORE THAN HALF THE DAYS

## 2017-09-27 ASSESSMENT — PAIN SCALES - GENERAL: PAINLEVEL: SEVERE PAIN (7)

## 2017-09-27 NOTE — ED AVS SNAPSHOT
[facility]    [address] [address]    Phone:  [phone]                                       Latasha Fisher   MRN: [id number]    Department[facility]  [facility]   Date of Visit:  9/27/2017           After Visit Summary Signature Page     I have received my discharge instructions, and my questions have been answered. I have discussed any challenges I see with this plan with the nurse or doctor.    ..........................................................................................................................................  Patient/Patient Representative Signature      ..........................................................................................................................................  Patient Representative Print Name and Relationship to Patient    ..................................................               ................................................  Date                                            Time    ..........................................................................................................................................  Reviewed by Signature/Title    ...................................................              ..............................................  Date                                                            Time

## 2017-09-27 NOTE — NURSING NOTE
"Chief Complaint   Patient presents with     Contraception     Pt is in to  disscuss changing her BCP. Pt is on Depo Provera and is not happy with it. Pt has been having periods/spotting twice a month since the birth of her last child. Pt has severe cramps pain when she gets her period.       Initial /65 (BP Location: Left arm, Patient Position: Chair, Cuff Size: Adult Regular)  Pulse 79  Temp 98  F (36.7  C) (Tympanic)  Resp 16  Ht 5' 6\" (1.676 m)  Wt 136 lb (61.7 kg)  LMP 09/24/2017  SpO2 98%  BMI 21.95 kg/m2 Estimated body mass index is 21.95 kg/(m^2) as calculated from the following:    Height as of this encounter: 5' 6\" (1.676 m).    Weight as of this encounter: 136 lb (61.7 kg).  Medication Reconciliation: complete   Ania Benitez    "

## 2017-09-27 NOTE — MR AVS SNAPSHOT
"              After Visit Summary   2017    Latasha Fisher    MRN: [id number]           Patient Information     Date Of Birth          1990        Visit Information        Provider Department      2017 9:45 AM Maximus Kemp MD [facility]        Today's Diagnoses     Encounter for initial prescription of contraceptive pills    -  1    ACP (advance care planning)        Menorrhagia with regular cycle        Tobacco abuse          Care Instructions    F/u with ongoing concerns.           Follow-ups after your visit        Who to contact     If you have questions or need follow up information about today's clinic visit or your schedule please contact [facility] directly at [phone].  Normal or non-critical lab and imaging results will be communicated to you by MyChart, letter or phone within 4 business days after the clinic has received the results. If you do not hear from us within 7 days, please contact the clinic through devsistershart or phone. If you have a critical or abnormal lab result, we will notify you by phone as soon as possible.  Submit refill requests through [employer] or call your pharmacy and they will forward the refill request to us. Please allow 3 business days for your refill to be completed.          Additional Information About Your Visit        MyChart Information     [employer] lets you send messages to your doctor, view your test results, renew your prescriptions, schedule appointments and more. To sign up, go to [email or web address] . Click on \"Log in\" on the left side of the screen, which will take you to the Welcome page. Then click on \"Sign up Now\" on the right side of the page.     You will be asked to enter the access code listed below, as well as some personal information. Please follow the directions to create your username and password.     Your access code is: [email or web address]  Expires: 2017  9:52 AM     Your access code will  in " "90 days. If you need help or a new code, please call your [facility] or [phone].        Care EveryWhere ID     This is your Care EveryWhere ID. This could be used by other organizations to access your [place] medical records  [id number]        Your Vitals Were     Pulse Temperature Respirations Height Last Period Pulse Oximetry    79 98  F (36.7  C) (Tympanic) 16 5' 6\" (1.676 m) 09/24/2017 98%    BMI (Body Mass Index)                   21.95 kg/m2            Blood Pressure from Last 3 Encounters:   09/27/17 104/65   07/07/17 102/73   07/05/17 110/75    Weight from Last 3 Encounters:   09/27/17 136 lb (61.7 kg)   06/12/17 128 lb 12.8 oz (58.4 kg)   02/21/17 137 lb (62.1 kg)              We Performed the Following     HCG qualitative urine     TOBACCO CESSATION - FOR HEALTH MAINTENANCE          Today's Medication Changes          These changes are accurate as of: 9/27/17  9:52 AM.  If you have any questions, ask your nurse or doctor.               Start taking these medicines.        Dose/Directions    levonorgestrel-ethinyl estradiol 0.15-0.03 MG per tablet   Commonly known as:  SEASONALE   Used for:  Encounter for initial prescription of contraceptive pills   Started by:  Maximus Kemp MD        Dose:  1 tablet   Take 1 tablet by mouth daily   Quantity:  91 tablet   Refills:  3            Where to get your medicines      These medications were sent to [id number] - [place], [place] - [address]  [address] [facility] [address] [address]     Phone:  [phone]     levonorgestrel-ethinyl estradiol 0.15-0.03 MG per tablet                Primary Care Provider Office Phone # Fax #    Maximus Kemp [phone]       [address] [place] [facility] [address]        Equal Access to Services     RADHA GRESHAM AH: Sobia howardo Sokaren, waaxda luqadaha, qaybta kaalmada adeegyada, annie escobedo. So [facility] [phone].    ATENCIÓN: Si habla " español, tiene a rodriguez disposición servicios gratuitos de asistencia lingüística. Timmy mejia [phone].    We comply with applicable federal civil rights laws and [place] laws. We do not discriminate on the basis of race, color, national origin, age, disability sex, sexual orientation or gender identity.            Thank you!     Thank you for choosing [facility]  for your care. Our goal is always to provide you with excellent care. Hearing back from our patients is one way we can continue to improve our services. Please take a few minutes to complete the written survey that you may receive in the mail after your visit with us. Thank you!             Your Updated Medication List - Protect others around you: Learn how to safely use, store and throw away your medicines at www.disposemymeds.org.          This list is accurate as of: 9/27/17  9:52 AM.  Always use your most recent med list.                   Brand Name Dispense Instructions for use Diagnosis    albuterol 108 (90 BASE) MCG/ACT Inhaler    VENTOLIN HFA    1 Inhaler    Inhale 2 puffs into the lungs every 4 hours as needed for shortness of breath / dyspnea or wheezing INHALE TWO PUFFS INTO LUNGS EVERY 4 HOURS AS NEEDED FOR SHORTNESS OF BREATH, SHAKE BEFORE USING    Unspecified asthma(493.90)       IBUPROFEN PO      Take 600 mg by mouth        levonorgestrel-ethinyl estradiol 0.15-0.03 MG per tablet    SEASONALE    91 tablet    Take 1 tablet by mouth daily    Encounter for initial prescription of contraceptive pills       medroxyPROGESTERone 150 MG/ML injection    DEPO-PROVERA    3 mL    Inject 1 mL (150 mg) into the muscle every 3 months    Encounter for initial prescription of contraceptives

## 2017-09-27 NOTE — ED AVS SNAPSHOT
[facility]    [address]    [facility] [address]    Phone:  [phone]                                       Latasha Fisher   MRN: [id number]    Department[facility]   Date of Visit:  9/27/2017           Patient Information     Date Of Birth          1990        Your diagnoses for this visit were:     Abdominal pain of unknown etiology        You were seen by John Addison PA-C.      Follow-up Information     Follow up with Maximus Kemp MD.    Specialty:  Family Practice    Contact information:    [facility] [facility]  [address] [place] [address] [phone]          Follow up with [facility].    Specialty:  EMERGENCY MEDICINE    Why:  If symptoms worsen    Contact information:    [address]  [facility] [address] [phone]    Additional information:    From [facility]: Take [address]. Turn left at [address]. Turn left at the first stoplight on [address]. At the first stop sign, take a right onto [address]. Take a left into the parking lot and continue through until you reach the North enterance of the building.       From [place]: Take [address]. Take the [address] ramp towards [place]. Turn left onto [address]. Take a slight right onto [address]. Turn left at the first stoplight on [address]. At the first stop sign, take a right onto [address]. Take a left into the parking lot and continue through until you reach the North enterance of the building.       From [place]: Take [address]. Take a right at [address]. At the first stop sign, take a right onto [address]. Take a left into the parking lot and continue through until you reach the North enterance of the building.         Discharge Instructions       As we discussed, I do not know what is causing your pain today.      Pain medications as needed.     Please return  HERE for ANY fevers, worsening pain, unilateral pain, or ANY other concerns or questions.        Review of your medicines      START taking        Dose / Directions Last dose taken    HYDROcodone-acetaminophen 5-325 MG   Commonly known as:  NORCO   Dose:  1 tablet   Quantity:  6 tablet        Take 1 tablet by mouth every 4 hours as needed for moderate to severe pain   Refills:  0        ketorolac 10 MG tablet   Commonly known as:  TORADOL   Dose:  10 mg   Quantity:  20 tablet        Take 1 tablet (10 mg) by mouth every 6 hours as needed for moderate pain   Refills:  0          Our records show that you are taking the medicines listed below. If these are incorrect, please call your family doctor or clinic.        Dose / Directions Last dose taken    albuterol 108 (90 BASE) MCG/ACT Inhaler   Commonly known as:  VENTOLIN HFA   Dose:  2 puff   Quantity:  1 Inhaler        Inhale 2 puffs into the lungs every 4 hours as needed for shortness of breath / dyspnea or wheezing INHALE TWO PUFFS INTO LUNGS EVERY 4 HOURS AS NEEDED FOR SHORTNESS OF BREATH, SHAKE BEFORE USING   Refills:  0        IBUPROFEN PO   Dose:  600 mg        Take 600 mg by mouth   Refills:  0        levonorgestrel-ethinyl estradiol 0.15-0.03 MG per tablet   Commonly known as:  SEASONALE   Dose:  1 tablet   Quantity:  91 tablet        Take 1 tablet by mouth daily   Refills:  3                Prescriptions were sent or printed at these locations (2 Prescriptions)                   Other Prescriptions                Printed at Department/Unit printer (2 of 2)         HYDROcodone-acetaminophen (NORCO) 5-325 MG               ketorolac (TORADOL) 10 MG tablet                Procedures and tests performed during your visit     CBC with platelets differential    CRP inflammation    Comprehensive metabolic panel    HCG qualitative urine    UA reflex to Microscopic      Orders Needing Specimen Collection     None      Pending Results     No orders found from 9/25/2017  "to 2017.            Pending Culture Results     No orders found from 2017 to 2017.            Thank you for choosing [place]       Thank you for choosing [place] for your care. Our goal is always to provide you with excellent care. Hearing back from our patients is one way we can continue to improve our services. Please take a few minutes to complete the written survey that you may receive in the mail after you visit with us. Thank you!        Lion Stre[employer] Information     [employer] lets you send messages to your doctor, view your test results, renew your prescriptions, schedule appointments and more. To sign up, go to [email or web address] . Click on \"Log in\" on the left side of the screen, which will take you to the Welcome page. Then click on \"Sign up Now\" on the right side of the page.     You will be asked to enter the access code listed below, as well as some personal information. Please follow the directions to create your username and password.     Your access code is: [email or web address]  Expires: 2017  9:52 AM     Your access code will  in 90 days. If you need help or a new code, please call your [place] clinic or [phone].        Care EveryWhere ID     This is your Care EveryWhere ID. This could be used by other organizations to access your [place] medical records  [id number]        Equal Access to Services     RADHA GRESHAM AH: Hadii addie Romo, waaxda luqadaha, qaybta kaalmada jamal, annie escobedo. So [facility] [phone].    ATENCIÓN: Si habla español, tiene a rodriguez disposición servicios gratuitos de asistencia lingüística. Timmy al [phone].    We comply with applicable federal civil rights laws and [place] laws. We do not discriminate on the basis of race, color, national origin, age, disability sex, sexual orientation or gender identity.            After Visit Summary       This is your record. Keep this with you and show to your " community pharmacist(s) and doctor(s) at your next visit.

## 2017-09-28 ASSESSMENT — ANXIETY QUESTIONNAIRES: GAD7 TOTAL SCORE: 8

## 2017-09-28 NOTE — ED NOTES
Pt d/c to home. Pt given verbal and written d/c instructions and pt verbalizes understanding. Pt given TH pack of 6 tablets of norco and written prescription for toradol. Pt declines d/c vital signs.

## 2017-09-28 NOTE — ED NOTES
"Pt presents to ED for pelvic pain and nausea that started on Sunday. Pt notes that she is \"just getting off depo shot\" and had some cramping, similar to \"period\" cramping. Pt denies any change to vaginal d/c and denies urinary symptoms. Pt in gown, has given urine sample and resting comfortably.  "

## 2017-09-28 NOTE — ED PROVIDER NOTES
History     Chief Complaint   Patient presents with     Abdominal Pain     The history is provided by the patient.     Latasha Fisher is a 27 year old female who presented to the ED ambulatory for evaluation of lower abdominal pain for 4 days.  No vaginal bleeding.  No change in discharge.  No fevers.  No diarrhea.  No LUTS.  No nausea or vomiting.  Cramping pain.  She has had an appendectomy.     I have reviewed the Medications, Allergies, Past Medical and Surgical History, and Social History in the Epic system.    Allergies: No Known Allergies      No current facility-administered medications on file prior to encounter.   Current Outpatient Prescriptions on File Prior to Encounter:  IBUPROFEN PO Take 600 mg by mouth   levonorgestrel-ethinyl estradiol (SEASONALE) 0.15-0.03 MG per tablet Take 1 tablet by mouth daily   albuterol (VENTOLIN HFA) 108 (90 BASE) MCG/ACT inhaler Inhale 2 puffs into the lungs every 4 hours as needed for shortness of breath / dyspnea or wheezing INHALE TWO PUFFS INTO LUNGS EVERY 4 HOURS AS NEEDED FOR SHORTNESS OF BREATH, SHAKE BEFORE USING       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     Autosomal dominant polycystic kidney disease     Scoliosis (and kyphoscoliosis), idiopathic     Retained complete placenta     Introital dyspareunia     ACP (advance care planning)       Past Surgical History:   Procedure Laterality Date     KNEE SURGERY       LAPAROSCOPIC APPENDECTOMY  4/23/2013    Procedure: LAPAROSCOPIC APPENDECTOMY;  LAPAROSCOPIC APPENDECTOMY;  Surgeon: Paula Roberts MD;  Location: [facility]       Social History   Substance Use Topics     Smoking  "status: Current Every Day Smoker     Packs/day: 0.50     Years: 4.00     Smokeless tobacco: Never Used      Comment: Trying to quit     Alcohol use No      Comment: occasionally when she goes out' past hx heavier consumption       Most Recent Immunizations   Administered Date(s) Administered     DPT 03/13/1992     DTAP (<7y) 05/30/1996     HPV 01/04/2008     HepB 05/30/1996     Influenza (IIV3) 09/30/2010     Influenza Vaccine IM 3yrs+ 4 Valent IIV4 09/29/2015     MMR 08/23/2000     Meningococcal (Menactra ) 08/30/2007     OPV, trivalent, live 05/30/1996     Pedvax-hib 01/13/1992     Pneumococcal 23 valent 10/30/2010     TD (ADULT, 7+) 03/20/2003     TDAP Vaccine (Adacel) 08/30/2007     TDAP Vaccine (Boostrix) 10/20/2015       BMI: Estimated body mass index is 21.95 kg/(m^2) as calculated from the following:    Height as of an earlier encounter on 9/27/17: 1.676 m (5' 6\").    Weight as of an earlier encounter on 9/27/17: 61.7 kg (136 lb).      Review of Systems   Constitutional: Negative for chills and fever.   Gastrointestinal: Positive for abdominal pain. Negative for abdominal distention, diarrhea, nausea and vomiting.   Genitourinary: Positive for pelvic pain. Negative for dysuria, frequency, urgency, vaginal bleeding and vaginal discharge.   Skin: Negative.        Physical Exam   BP: 127/79  Pulse: 98  Temp: 98  F (36.7  C)  Resp: 20  SpO2: 97 %  Physical Exam   Constitutional: She is oriented to person, place, and time. She appears well-developed and well-nourished. No distress.   Cardiovascular: Normal rate and regular rhythm.    Pulmonary/Chest: Effort normal.   Abdominal: Soft. There is tenderness. There is no guarding.       Neurological: She is alert and oriented to person, place, and time.   Skin: Skin is warm and dry.   Psychiatric: She has a normal mood and affect.   Nursing note and vitals reviewed.      ED Course     ED Course     Procedures        Results for orders placed or performed during the " hospital encounter of 09/27/17 (from the past 24 hour(s))   UA reflex to Microscopic   Result Value Ref Range    Color Urine Yellow     Appearance Urine Slightly Cloudy     Glucose Urine Negative NEG^Negative mg/dL    Bilirubin Urine Negative NEG^Negative    Ketones Urine Negative NEG^Negative mg/dL    Specific Gravity Urine 1.013 1.003 - 1.035    Blood Urine Negative NEG^Negative    pH Urine 7.0 4.7 - 8.0 pH    Protein Albumin Urine Negative NEG^Negative mg/dL    Urobilinogen mg/dL 2.0 0.0 - 2.0 mg/dL    Nitrite Urine Negative NEG^Negative    Leukocyte Esterase Urine Negative NEG^Negative    Source Midstream Urine     RBC Urine 2 0 - 2 /HPF    WBC Urine 1 0 - 2 /HPF    Bacteria Urine Few (A) NEG^Negative /HPF    Squamous Epithelial /HPF Urine 6 (H) 0 - 1 /HPF    Mucous Urine Present (A) NEG^Negative /LPF   HCG qualitative urine   Result Value Ref Range    HCG Qual Urine Negative NEG^Negative   CBC with platelets differential   Result Value Ref Range    WBC 6.6 4.0 - 11.0 10e9/L    RBC Count 4.45 3.8 - 5.2 10e12/L    Hemoglobin 14.1 11.7 - 15.7 g/dL    Hematocrit 40.7 35.0 - 47.0 %    MCV 92 78 - 100 fl    MCH 31.7 26.5 - 33.0 pg    MCHC 34.6 31.5 - 36.5 g/dL    RDW 12.7 10.0 - 15.0 %    Platelet Count 245 150 - 450 10e9/L    Diff Method Automated Method     % Neutrophils 54.7 %    % Lymphocytes 31.2 %    % Monocytes 8.3 %    % Eosinophils 5.0 %    % Basophils 0.6 %    % Immature Granulocytes 0.2 %    Nucleated RBCs 0 0 /100    Absolute Neutrophil 3.6 1.6 - 8.3 10e9/L    Absolute Lymphocytes 2.1 0.8 - 5.3 10e9/L    Absolute Monocytes 0.6 0.0 - 1.3 10e9/L    Absolute Eosinophils 0.3 0.0 - 0.7 10e9/L    Absolute Basophils 0.0 0.0 - 0.2 10e9/L    Abs Immature Granulocytes 0.0 0 - 0.4 10e9/L    Absolute Nucleated RBC 0.0    Comprehensive metabolic panel   Result Value Ref Range    Sodium 139 133 - 144 mmol/L    Potassium 3.6 3.4 - 5.3 mmol/L    Chloride 108 94 - 109 mmol/L    Carbon Dioxide 28 20 - 32 mmol/L    Anion  Gap 3 3 - 14 mmol/L    Glucose 87 70 - 99 mg/dL    Urea Nitrogen 8 7 - 30 mg/dL    Creatinine 0.71 0.52 - 1.04 mg/dL    GFR Estimate >90 >60 mL/min/1.7m2    GFR Estimate If Black >90 >60 mL/min/1.7m2    Calcium 8.4 (L) 8.5 - 10.1 mg/dL    Bilirubin Total 1.0 0.2 - 1.3 mg/dL    Albumin 3.7 3.4 - 5.0 g/dL    Protein Total 6.7 (L) 6.8 - 8.8 g/dL    Alkaline Phosphatase 57 40 - 150 U/L    ALT 16 0 - 50 U/L    AST 11 0 - 45 U/L   CRP inflammation   Result Value Ref Range    CRP Inflammation 23.9 (H) 0.0 - 8.0 mg/L        Critical Care time:  none               Labs Ordered and Resulted from Time of ED Arrival Up to the Time of Departure from the ED   URINE MACROSCOPIC WITH REFLEX TO MICRO - Abnormal; Notable for the following:        Result Value    Bacteria Urine Few (*)     Squamous Epithelial /HPF Urine 6 (*)     Mucous Urine Present (*)     All other components within normal limits   COMPREHENSIVE METABOLIC PANEL - Abnormal; Notable for the following:     Calcium 8.4 (*)     Protein Total 6.7 (*)     All other components within normal limits   CRP INFLAMMATION - Abnormal; Notable for the following:     CRP Inflammation 23.9 (*)     All other components within normal limits   HCG QUALITATIVE URINE   CBC WITH PLATELETS DIFFERENTIAL       Assessments & Plan (with Medical Decision Making)   Long discussion regarding her pain.  No appendix.  No indication for CT scanning.  Exam relatively benign.  No vaginal discharge/  Discussed PID and the like.  Latasha refused a pelvic exam and promised to follow-up and return here for any fevers or worsening.  Not consistent with TOA or ovarian torsion.  Pain medications for home.  Voiced complete understanding and was happy and agreeable.     I have reviewed the nursing notes.    I have reviewed the findings, diagnosis, plan and need for follow up with the patient.       New Prescriptions    HYDROCODONE-ACETAMINOPHEN (NORCO) 5-325 MG    Take 1 tablet by mouth every 4 hours as needed  for moderate to severe pain    KETOROLAC (TORADOL) 10 MG TABLET    Take 1 tablet (10 mg) by mouth every 6 hours as needed for moderate pain       Final diagnoses:   Abdominal pain of unknown etiology       9/27/2017   [facility]     John Addison PA-C  09/27/17 1871

## 2017-09-28 NOTE — DISCHARGE INSTRUCTIONS
As we discussed, I do not know what is causing your pain today.      Pain medications as needed.     Please return HERE for ANY fevers, worsening pain, unilateral pain, or ANY other concerns or questions.

## 2017-09-29 ENCOUNTER — OFFICE VISIT (OUTPATIENT)
Dept: OBGYN | Facility: OTHER | Age: 27
End: 2017-09-29
Attending: NURSE PRACTITIONER
Payer: COMMERCIAL

## 2017-09-29 VITALS
HEART RATE: 92 BPM | WEIGHT: 130 LBS | SYSTOLIC BLOOD PRESSURE: 104 MMHG | HEIGHT: 66 IN | BODY MASS INDEX: 20.89 KG/M2 | DIASTOLIC BLOOD PRESSURE: 70 MMHG | OXYGEN SATURATION: 99 %

## 2017-09-29 DIAGNOSIS — R10.2 PELVIC PAIN IN FEMALE: Primary | ICD-10-CM

## 2017-09-29 LAB
SPECIMEN SOURCE: NORMAL
WET PREP SPEC: NORMAL

## 2017-09-29 PROCEDURE — 99213 OFFICE O/P EST LOW 20 MIN: CPT | Performed by: NURSE PRACTITIONER

## 2017-09-29 PROCEDURE — 99000 SPECIMEN HANDLING OFFICE-LAB: CPT | Performed by: NURSE PRACTITIONER

## 2017-09-29 PROCEDURE — 87491 CHLMYD TRACH DNA AMP PROBE: CPT | Mod: ZL | Performed by: NURSE PRACTITIONER

## 2017-09-29 PROCEDURE — 99212 OFFICE O/P EST SF 10 MIN: CPT

## 2017-09-29 PROCEDURE — 87210 SMEAR WET MOUNT SALINE/INK: CPT | Mod: ZL | Performed by: NURSE PRACTITIONER

## 2017-09-29 PROCEDURE — 87591 N.GONORRHOEAE DNA AMP PROB: CPT | Mod: ZL | Performed by: NURSE PRACTITIONER

## 2017-09-29 ASSESSMENT — PAIN SCALES - GENERAL: PAINLEVEL: NO PAIN (0)

## 2017-09-29 NOTE — MR AVS SNAPSHOT
"              After Visit Summary   2017    Latasha Fisher    MRN: [id number]           Patient Information     Date Of Birth          1990        Visit Information        Provider Department      2017 2:30 PM Latosha Kathleen NP [facility]        Today's Diagnoses     Pelvic pain in female    -  1      Care Instructions    Follow up as needed          Follow-ups after your visit        Who to contact     If you have questions or need follow up information about today's clinic visit or your schedule please contact [facility] directly at [phone].  Normal or non-critical lab and imaging results will be communicated to you by [employer], letter or phone within 4 business days after the clinic has received the results. If you do not hear from us within 7 days, please contact the clinic through [employer] or phone. If you have a critical or abnormal lab result, we will notify you by phone as soon as possible.  Submit refill requests through [employer] or call your pharmacy and they will forward the refill request to us. Please allow 3 business days for your refill to be completed.          Additional Information About Your Visit        MyChart Information     InOpen lets you send messages to your doctor, view your test results, renew your prescriptions, schedule appointments and more. To sign up, go to [email or web address] . Click on \"Log in\" on the left side of the screen, which will take you to the Welcome page. Then click on \"Sign up Now\" on the right side of the page.     You will be asked to enter the access code listed below, as well as some personal information. Please follow the directions to create your username and password.     Your access code is: [email or web address]  Expires: 2017  9:52 AM     Your access code will  in 90 days. If you need help or a new code, please call your [facility] or [phone].        Care EveryWhere ID     This is your Care " "EveryWhere ID. This could be used by other organizations to access your [facility] medical records  [id number]        Your Vitals Were     Pulse Height Last Period Pulse Oximetry BMI (Body Mass Index)       92 5' 6\" (1.676 m) 09/24/2017 99% 20.98 kg/m2        Blood Pressure from Last 3 Encounters:   09/29/17 104/70   09/27/17 127/79   09/27/17 104/65    Weight from Last 3 Encounters:   09/29/17 130 lb (59 kg)   09/27/17 136 lb (61.7 kg)   06/12/17 128 lb 12.8 oz (58.4 kg)              We Performed the Following     Chlamydia trachomatis PCR     Neisseria gonorrhoeae PCR     Wet prep        Primary Care Provider Office Phone # Fax #    Maximus Kemp [phone]       [facility] [address]  [facility] [address]        Equal Access to Services     [facility]: Hadii aad ku hadasho Soomaali, waaxda luqadaha, qaybta kaalmada adeegyada, waxay idiin hayaan adekim kharash lahilln . So [facility] [phone].    ATENCIÓN: Si habla español, tiene a rodriguez disposición servicios gratuitos de asistencia lingüística. Timmy al [phone].    We comply with applicable federal civil rights laws and [place] laws. We do not discriminate on the basis of race, color, national origin, age, disability, sex, sexual orientation, or gender identity.            Thank you!     Thank you for choosing [facility]  for your care. Our goal is always to provide you with excellent care. Hearing back from our patients is one way we can continue to improve our services. Please take a few minutes to complete the written survey that you may receive in the mail after your visit with us. Thank you!             Your Updated Medication List - Protect others around you: Learn how to safely use, store and throw away your medicines at www.disposemymeds.org.          This list is accurate as of: 9/29/17 11:59 PM.  Always use your most recent med list.                   Brand Name Dispense Instructions for use " Diagnosis    albuterol 108 (90 BASE) MCG/ACT Inhaler    VENTOLIN HFA    1 Inhaler    Inhale 2 puffs into the lungs every 4 hours as needed for shortness of breath / dyspnea or wheezing INHALE TWO PUFFS INTO LUNGS EVERY 4 HOURS AS NEEDED FOR SHORTNESS OF BREATH, SHAKE BEFORE USING    Unspecified asthma(493.90)       HYDROcodone-acetaminophen 5-325 MG    NORCO    6 tablet    Take 1 tablet by mouth every 4 hours as needed for moderate to severe pain        IBUPROFEN PO      Take 600 mg by mouth        ketorolac 10 MG tablet    TORADOL    20 tablet    Take 1 tablet (10 mg) by mouth every 6 hours as needed for moderate pain        levonorgestrel-ethinyl estradiol 0.15-0.03 MG per tablet    SEASONALE    91 tablet    Take 1 tablet by mouth daily    Encounter for initial prescription of contraceptive pills

## 2017-09-29 NOTE — NURSING NOTE
"Chief Complaint   Patient presents with     RECHECK     Follow up for pelvic pain       Initial /70  Pulse 92  Ht 5' 6\" (1.676 m)  Wt 130 lb (59 kg)  LMP 09/24/2017  SpO2 99%  BMI 20.98 kg/m2 Estimated body mass index is 20.98 kg/(m^2) as calculated from the following:    Height as of this encounter: 5' 6\" (1.676 m).    Weight as of this encounter: 130 lb (59 kg).  Medication Reconciliation: complete      Dhara Soriano      "

## 2017-10-03 LAB
C TRACH DNA SPEC QL NAA+PROBE: NEGATIVE
N GONORRHOEA DNA SPEC QL NAA+PROBE: NEGATIVE
SPECIMEN SOURCE: NORMAL
SPECIMEN SOURCE: NORMAL

## 2017-10-03 NOTE — PROGRESS NOTES
"[facility]                HPI   Here for ED follow up on low pelvic pain.  Went off of Depo Provera in August.  No period yet.  states pain has decreased over the past couple of days and continues to improve.  Denies change in vaginal discharge, odor, or irritation.  LMP 1 week ago.               Medications:     Current Outpatient Prescriptions Ordered in Epic   Medication     levonorgestrel-ethinyl estradiol (SEASONALE) 0.15-0.03 MG per tablet     HYDROcodone-acetaminophen (NORCO) 5-325 MG     ketorolac (TORADOL) 10 MG tablet     IBUPROFEN PO     albuterol (VENTOLIN HFA) 108 (90 BASE) MCG/ACT inhaler     No current Epic-ordered facility-administered medications on file.                 Allergies:   Review of patient's allergies indicates no known allergies.         Review of Systems:   The 5 point Review of Systems is negative other than noted in the HPI                     Physical Exam:   Blood pressure 104/70, pulse 92, height 5' 6\" (1.676 m), weight 130 lb (59 kg), last menstrual period 09/24/2017, SpO2 99 %, not currently breastfeeding.  Constitutional:   awake, alert, cooperative, no apparent distress, and appears stated age     Abdomen:   No scars, normal bowel sounds, soft, non-distended, non-tender, no masses palpated, no hepatosplenomegally     Genitounirinary:   External Genitalia:  General appearance; normal  Vagina:  Discharge absent, Lesions absent  Cervix:  Discharge absent, Tenderness absent  Uterus:  Size normal  Adenexa:  Tenderness absent     G/C and wet prep obtained          Assessment and Plan:   Pelvic pain - likely ovulatory. Pt will monitor.  She will call if pain increases and we will consider a pelvic US.  Will treat any findings on labs.  Follow up as needed.      BRITNI Robison  10/3/2017  1:44 PM  "

## 2017-11-17 ENCOUNTER — HOSPITAL ENCOUNTER (EMERGENCY)
Facility: HOSPITAL | Age: 27
Discharge: HOME | End: 2017-11-17
Attending: NURSE PRACTITIONER | Admitting: NURSE PRACTITIONER
Payer: COMMERCIAL

## 2017-11-17 VITALS
BODY MASS INDEX: 22.6 KG/M2 | SYSTOLIC BLOOD PRESSURE: 117 MMHG | OXYGEN SATURATION: 98 % | DIASTOLIC BLOOD PRESSURE: 61 MMHG | RESPIRATION RATE: 18 BRPM | HEART RATE: 94 BPM | TEMPERATURE: 98.4 F | WEIGHT: 140 LBS

## 2017-11-17 DIAGNOSIS — J02.0 STREP THROAT: ICD-10-CM

## 2017-11-17 LAB
DEPRECATED S PYO AG THROAT QL EIA: ABNORMAL
SPECIMEN SOURCE: ABNORMAL

## 2017-11-17 PROCEDURE — 25000128 H RX IP 250 OP 636

## 2017-11-17 PROCEDURE — 96372 THER/PROPH/DIAG INJ SC/IM: CPT

## 2017-11-17 PROCEDURE — 99214 OFFICE O/P EST MOD 30 MIN: CPT | Mod: 25

## 2017-11-17 PROCEDURE — 87880 STREP A ASSAY W/OPTIC: CPT | Performed by: FAMILY MEDICINE

## 2017-11-17 PROCEDURE — 99213 OFFICE O/P EST LOW 20 MIN: CPT | Performed by: NURSE PRACTITIONER

## 2017-11-17 RX ADMIN — PENICILLIN G BENZATHINE 1.2 MILLION UNITS: 1200000 INJECTION, SUSPENSION INTRAMUSCULAR at 11:28

## 2017-11-17 ASSESSMENT — ENCOUNTER SYMPTOMS
MYALGIAS: 1
ABDOMINAL PAIN: 0
COUGH: 0
FATIGUE: 1
VOMITING: 0
ARTHRALGIAS: 1
SORE THROAT: 1
NAUSEA: 0
FEVER: 0
CHILLS: 0
APPETITE CHANGE: 0
DIARRHEA: 0

## 2017-11-17 NOTE — ED AVS SNAPSHOT
[facility]    [address] [address]    Phone:  [phone]                                       Latasha Fisher   MRN: [id number]    Department:  [facility]   Date of Visit:  11/17/2017           Patient Information     Date Of Birth          1990        Your diagnoses for this visit were:     Strep throat        You were seen by Negar Mckeon NP.      Follow-up Information     Follow up with [facility].    Specialty:  EMERGENCY MEDICINE    Why:  As needed, If symptoms worsen, or concerns develop    Contact information:    [address] [address] [phone]    Additional information:    From [place]: Take [address]. Turn left at [address]. Turn left at the first stoplight on [address]. At the first stop sign, take a right onto [address]. Take a left into the parking lot and continue through until you reach the North enterance of the building.       From [place]: Take [address]. Take the [address] ramp towards [place]. Turn left onto [address]. Take a slight right onto [address]. Turn left at the first stoplight on [address]. At the first stop sign, take a right onto [address]. Take a left into the parking lot and continue through until you reach the North enterance of the building.       From [place]: Take [address]. Take a right at [address]. At the first stop sign, take a right onto [address]. Take a left into the parking lot and continue through until you reach the North enterance of the building.         Follow up with Maximus Kemp MD.    Specialty:  Family Practice    Why:  As needed, if symptoms do not improve    Contact information:     [facility]  [address] [facility] [address]  [phone]          Discharge Instructions         Pharyngitis: Strep (Confirmed)    You have had a  positive test for strep throat. Strep throat is a contagious illness. It is spread by coughing, kissing or by touching others after touching your mouth or nose. Symptoms include throat pain that is worse with swallowing, aching all over, headache, and fever. It is treated with antibiotic medicine. This should help you start to feel better in 1 to 2 days.  Home care    Rest at home. Drink plenty of fluids to you won't get dehydrated.    No work or school for the first 2 days of taking the antibiotics. After this time, you will not be contagious. You can then return to school or work if you are feeling better.     Take antibiotic medicine for the full 10 days, even if you feel better. This is very important to ensure the infection is treated. It is also important to prevent medicine-resistant germs from developing. If you were given an antibiotic shot, you don't need any more antibiotics.    You may use acetaminophen or ibuprofen to control pain or fever, unless another medicine was prescribed for this. Talk with your doctor before taking these medicines if you have chronic liver or kidney disease. Also talk with your doctor if you have had a stomach ulcer or GI bleeding.    Throat lozenges or sprays help reduce pain. Gargling with warm saltwater will also reduce throat pain. Dissolve 1/2 teaspoon of salt in 1 glass of warm water. This may be useful just before meals.     Soft foods are OK. Avoid salty or spicy foods.  Follow-up care  Follow up with your healthcare provider or our staff if you don't get better over the next week.  When to seek medical advice  Call your healthcare provider right away if any of these occur:    Fever of 100.4 F (38 C) or higher, or as directed by your healthcare provider    New or worsening ear pain, sinus pain, or headache    Painful lumps in the back of neck    Stiff neck    Lymph nodes getting larger or becoming soft in the middle    You can't swallow liquids or you can't open your  mouth wide because of throat pain    Signs of dehydration. These include very dark urine or no urine, sunken eyes, and dizziness.    Trouble breathing or noisy breathing    Muffled voice    Rash  Date Last Reviewed: 4/13/2015 2000-2017 [employer]. [address] [place] [address]. All rights reserved. This information is not intended as a substitute for professional medical care. Always follow your healthcare professional's instructions.             Review of your medicines      Our records show that you are taking the medicines listed below. If these are incorrect, please call your family doctor or clinic.        Dose / Directions Last dose taken    albuterol 108 (90 BASE) MCG/ACT Inhaler   Commonly known as:  VENTOLIN HFA   Dose:  2 puff   Quantity:  1 Inhaler        Inhale 2 puffs into the lungs every 4 hours as needed for shortness of breath / dyspnea or wheezing INHALE TWO PUFFS INTO LUNGS EVERY 4 HOURS AS NEEDED FOR SHORTNESS OF BREATH, SHAKE BEFORE USING   Refills:  0        IBUPROFEN PO   Dose:  600 mg        Take 600 mg by mouth   Refills:  0        ketorolac 10 MG tablet   Commonly known as:  TORADOL   Dose:  10 mg   Quantity:  20 tablet        Take 1 tablet (10 mg) by mouth every 6 hours as needed for moderate pain   Refills:  0        levonorgestrel-ethinyl estradiol 0.15-0.03 MG per tablet   Commonly known as:  SEASONALE   Dose:  1 tablet   Quantity:  91 tablet        Take 1 tablet by mouth daily   Refills:  3                Procedures and tests performed during your visit     Rapid strep screen      Orders Needing Specimen Collection     None      Pending Results     No orders found from 11/15/2017 to 11/18/2017.            Pending Culture Results     No orders found from 11/15/2017 to 11/18/2017.            Thank you for choosing [facility]       Thank you for choosing [facility] for your care. Our goal is always to provide you with excellent care. Hearing back from our patients  "is one way we can continue to improve our services. Please take a few minutes to complete the written survey that you may receive in the mail after you visit with us. Thank you!        [employer] Information     [employer] lets you send messages to your doctor, view your test results, renew your prescriptions, schedule appointments and more. To sign up, go to [email or web address] . Click on \"Log in\" on the left side of the screen, which will take you to the Welcome page. Then click on \"Sign up Now\" on the right side of the page.     You will be asked to enter the access code listed below, as well as some personal information. Please follow the directions to create your username and password.     Your access code is: [email or web address]  Expires: 2017  8:52 AM     Your access code will  in 90 days. If you need help or a new code, please call your [facility] clinic or [phone].        Care EveryWhere ID     This is your Care EveryWhere ID. This could be used by other organizations to access your [facility] medical records  [id number]        Equal Access to Services     [facility] : Hadaaliyah Romo, tobi cordero, duc berry, annie elizabeth . So [facility] [phone].    ATENCIÓN: Si habla español, tiene a rodriguez disposición servicios gratuitos de asistencia lingüística. Tamikoame al [phone].    We comply with applicable federal civil rights laws and [place] laws. We do not discriminate on the basis of race, color, national origin, age, disability, sex, sexual orientation, or gender identity.            After Visit Summary       This is your record. Keep this with you and show to your community pharmacist(s) and doctor(s) at your next visit.                  "

## 2017-11-17 NOTE — ED PROVIDER NOTES
History     Chief Complaint   Patient presents with     Pharyngitis     son had strep 2 wks ago     The history is provided by the patient. No  was used.     Latasha Fisher is a 27 year old female who presents today with a CC of sore throat, headache, and body aches x 2 days.  No fevers or chills.  She was exposed to her son who was positive for strep recently.  She has been taking ibuprofen for pain with relief.  She has been able to swallow well, she is pushing fluids.  She has a history of polycystic kidney disease, last BUN/Creatinine WNL 9/27/17.  She declines need for pain medications in UC today.     Problem List:    Patient Active Problem List    Diagnosis Date Noted     ACP (advance care planning) 09/27/2017     Priority: Medium     Advance Care Planning 9/27/2017: ACP Review of Chart / Resources Provided:  Reviewed chart for advance care plan.  Latasha Fisher has no plan or code status on file. Discussed available resources and provided with information.   Added by Ania Benitez             Introital dyspareunia 07/18/2017     Priority: Medium     Retained complete placenta 01/04/2016     Priority: Medium     Removed manually and currettaged. No excess blood loss noted       Papanicolaou smear of cervix with low grade squamous intraepithelial lesion (LGSIL) 08/25/2015     Priority: Medium     5/15.  [facility].   LGSIL, + HR HPV.  F/u pap pp.       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 06/04/2008     Priority: Medium     Pain in joint, lower leg 05/29/2008     Priority: Medium     Notalgia  08/25/2006     Priority: Medium     Overview:   IMO Update 10/11       Pes planus 08/25/2006     Priority: Medium     Overview:   IMO Update 10 2016       Scoliosis (and kyphoscoliosis), idiopathic 07/26/2005     Priority: Medium     Overview:   Mild          Past Medical History:    Past Medical History:   Diagnosis Date     Acute pancreatitis child     Hepatitis remote     Scoliosis      Tobacco abuse        Past Surgical History:    Past Surgical History:   Procedure Laterality Date     KNEE SURGERY       LAPAROSCOPIC APPENDECTOMY  4/23/2013    Procedure: LAPAROSCOPIC APPENDECTOMY;  LAPAROSCOPIC APPENDECTOMY;  Surgeon: Paula Roberts MD;  Location: [facility]       Family History:    Family History   Problem Relation Age of Onset     Thyroid Disease Mother      DIABETES Father      Asthma Sister      Attention Deficit Disorder Brother        Social History:  Marital Status:  Single [1]  Social History   Substance Use Topics     Smoking status: Current Every Day Smoker     Packs/day: 0.50     Years: 4.00     Smokeless tobacco: Never Used      Comment: Trying to quit     Alcohol use No      Comment: occasionally when she goes out' past hx heavier consumption        Medications:      levonorgestrel-ethinyl estradiol (SEASONALE) 0.15-0.03 MG per tablet   ketorolac (TORADOL) 10 MG tablet   IBUPROFEN PO   albuterol (VENTOLIN HFA) 108 (90 BASE) MCG/ACT inhaler         Review of Systems   Constitutional: Positive for fatigue. Negative for appetite change, chills and fever.   HENT: Positive for ear pain (sore throat radiates to right ear) and sore throat.    Respiratory: Negative for cough.    Gastrointestinal: Negative for abdominal pain, diarrhea, nausea and vomiting.   Musculoskeletal: Positive for arthralgias and myalgias.   Skin: Negative for rash.       Physical Exam   BP: 117/61  Pulse: 94  Temp: 98.4  F (36.9  C)  Resp: 18  Weight: 63.5 kg (140 lb)  SpO2: 98 %      Physical Exam   Constitutional: She is oriented to  person, place, and time. She appears well-developed and well-nourished.   HENT:   Head: Normocephalic and atraumatic.   Right Ear: Tympanic membrane, external ear and ear canal normal.   Left Ear: Tympanic membrane, external ear and ear canal normal.   Mouth/Throat: Uvula is midline. No trismus in the jaw. Posterior oropharyngeal edema (right tonsil 3+, left tonsil 2.5+) and posterior oropharyngeal erythema present. No tonsillar abscesses (no obvious).   Eyes: Conjunctivae are normal.   Neck: Normal range of motion. Neck supple.   Cardiovascular: Normal rate and regular rhythm.    Pulmonary/Chest: Effort normal and breath sounds normal.   Lymphadenopathy:        Head (right side): Tonsillar adenopathy present. No submental, no submandibular, no preauricular, no posterior auricular and no occipital adenopathy present.        Head (left side): Tonsillar adenopathy present. No submental, no submandibular, no preauricular, no posterior auricular and no occipital adenopathy present.     She has no cervical adenopathy.   Neurological: She is alert and oriented to person, place, and time.   Skin: Skin is warm and dry. No rash (no rash noted on exposed skin) noted.   Psychiatric: She has a normal mood and affect. Her behavior is normal.   Nursing note and vitals reviewed.      ED Course     ED Course     Procedures    Results for orders placed or performed during the hospital encounter of 11/17/17   Rapid strep screen   Result Value Ref Range    Specimen Description Throat     Rapid Strep A Screen (A)      POSITIVE: Group A Streptococcal antigen detected by immunoassay.     Medications   penicillin G benzathine (BICILLIN) injection 1.2 Million Units (1.2 Million Units Intramuscular Given 11/17/17 1128)     Observed for a minimum of 20 minutes, tolerated medications well, no adverse efects noted    Assessments & Plan (with Medical Decision Making)     I have reviewed the nursing notes.    I have reviewed the findings,  "diagnosis, plan and need for follow up with the patient.  ASSESSMENT / PLAN:  (J02.0) Strep throat  Comment: symptomatic, treated with Bicillin 1.2 million units in UC today with no adverse effects  Plan:  Warm salt water gargles several times per day   Ibuprofen and tylenol per package directions for pain/fever   Cepacol lozenges OTC per package directions   Increase fluids, wash hands frequently, rest   Discard toothbrush after 24-48 hours and get a new one   Patient verbally educated and given appropriate education sheets for their diagnoses and has no questions.   Return to ED/UC if symptoms increase or concerns develop, red flag symptoms as discussed and per discharge instructions:    increasing throat pain, trouble swallowing, \"hot potato voice\", drooling, shortness of breath/airway compromise   Follow up with your Primary Care provider if symptoms do not improve in 2-3 days      Discharge Medication List as of 11/17/2017 11:29 AM          Final diagnoses:   Strep throat       11/17/2017   [facility]     Negar Mckeon NP  11/17/17 1200    "

## 2017-11-17 NOTE — DISCHARGE INSTRUCTIONS
Pharyngitis: Strep (Confirmed)    You have had a positive test for strep throat. Strep throat is a contagious illness. It is spread by coughing, kissing or by touching others after touching your mouth or nose. Symptoms include throat pain that is worse with swallowing, aching all over, headache, and fever. It is treated with antibiotic medicine. This should help you start to feel better in 1 to 2 days.  Home care    Rest at home. Drink plenty of fluids to you won't get dehydrated.    No work or school for the first 2 days of taking the antibiotics. After this time, you will not be contagious. You can then return to school or work if you are feeling better.     Take antibiotic medicine for the full 10 days, even if you feel better. This is very important to ensure the infection is treated. It is also important to prevent medicine-resistant germs from developing. If you were given an antibiotic shot, you don't need any more antibiotics.    You may use acetaminophen or ibuprofen to control pain or fever, unless another medicine was prescribed for this. Talk with your doctor before taking these medicines if you have chronic liver or kidney disease. Also talk with your doctor if you have had a stomach ulcer or GI bleeding.    Throat lozenges or sprays help reduce pain. Gargling with warm saltwater will also reduce throat pain. Dissolve 1/2 teaspoon of salt in 1 glass of warm water. This may be useful just before meals.     Soft foods are OK. Avoid salty or spicy foods.  Follow-up care  Follow up with your healthcare provider or our staff if you don't get better over the next week.  When to seek medical advice  Call your healthcare provider right away if any of these occur:    Fever of 100.4 F (38 C) or higher, or as directed by your healthcare provider    New or worsening ear pain, sinus pain, or headache    Painful lumps in the back of neck    Stiff neck    Lymph nodes getting larger or becoming soft in the  middle    You can't swallow liquids or you can't open your mouth wide because of throat pain    Signs of dehydration. These include very dark urine or no urine, sunken eyes, and dizziness.    Trouble breathing or noisy breathing    Muffled voice    Rash  Date Last Reviewed: 4/13/2015 2000-2017 [employer]. [address] [place] [address], [place] [address]. All rights reserved. This information is not intended as a substitute for professional medical care. Always follow your healthcare professional's instructions.

## 2017-11-17 NOTE — ED AVS SNAPSHOT
[facility]    [address] [address]    Phone:  [phone]                                       Latasha Fisher   MRN: [id number]    Department:  [facility]   Date of Visit:  11/17/2017           After Visit Summary Signature Page     I have received my discharge instructions, and my questions have been answered. I have discussed any challenges I see with this plan with the nurse or doctor.    ..........................................................................................................................................  Patient/Patient Representative Signature      ..........................................................................................................................................  Patient Representative Print Name and Relationship to Patient    ..................................................               ................................................  Date                                            Time    ..........................................................................................................................................  Reviewed by Signature/Title    ...................................................              ..............................................  Date                                                            Time

## 2017-12-06 ENCOUNTER — HOSPITAL ENCOUNTER (EMERGENCY)
Facility: HOSPITAL | Age: 27
Discharge: HOME | End: 2017-12-06
Attending: NURSE PRACTITIONER | Admitting: NURSE PRACTITIONER
Payer: COMMERCIAL

## 2017-12-06 VITALS
DIASTOLIC BLOOD PRESSURE: 59 MMHG | OXYGEN SATURATION: 98 % | SYSTOLIC BLOOD PRESSURE: 120 MMHG | RESPIRATION RATE: 14 BRPM | TEMPERATURE: 97 F

## 2017-12-06 DIAGNOSIS — B37.31 CANDIDIASIS OF VULVA AND VAGINA: ICD-10-CM

## 2017-12-06 DIAGNOSIS — R30.0 DYSURIA: ICD-10-CM

## 2017-12-06 LAB
ALBUMIN UR-MCNC: NEGATIVE MG/DL
APPEARANCE UR: ABNORMAL
BACTERIA #/AREA URNS HPF: ABNORMAL /HPF
BILIRUB UR QL STRIP: NEGATIVE
COLOR UR AUTO: YELLOW
GLUCOSE UR STRIP-MCNC: NEGATIVE MG/DL
HGB UR QL STRIP: ABNORMAL
KETONES UR STRIP-MCNC: NEGATIVE MG/DL
LEUKOCYTE ESTERASE UR QL STRIP: ABNORMAL
MUCOUS THREADS #/AREA URNS LPF: PRESENT /LPF
NITRATE UR QL: NEGATIVE
PH UR STRIP: 6.5 PH (ref 4.7–8)
RBC #/AREA URNS AUTO: 2 /HPF (ref 0–2)
SOURCE: ABNORMAL
SP GR UR STRIP: 1.01 (ref 1–1.03)
SQUAMOUS #/AREA URNS AUTO: 11 /HPF (ref 0–1)
UROBILINOGEN UR STRIP-MCNC: 2 MG/DL (ref 0–2)
WBC #/AREA URNS AUTO: 26 /HPF (ref 0–2)

## 2017-12-06 PROCEDURE — 99213 OFFICE O/P EST LOW 20 MIN: CPT | Performed by: NURSE PRACTITIONER

## 2017-12-06 PROCEDURE — 81001 URINALYSIS AUTO W/SCOPE: CPT | Performed by: NURSE PRACTITIONER

## 2017-12-06 PROCEDURE — 99213 OFFICE O/P EST LOW 20 MIN: CPT

## 2017-12-06 RX ORDER — SULFAMETHOXAZOLE/TRIMETHOPRIM 800-160 MG
1 TABLET ORAL 2 TIMES DAILY
Qty: 6 TABLET | Refills: 0 | Status: SHIPPED | OUTPATIENT
Start: 2017-12-06 | End: 2017-12-09

## 2017-12-06 RX ORDER — FLUCONAZOLE 150 MG/1
TABLET ORAL
Qty: 2 TABLET | Refills: 0 | Status: SHIPPED | OUTPATIENT
Start: 2017-12-06 | End: 2017-12-09

## 2017-12-06 ASSESSMENT — ENCOUNTER SYMPTOMS
FEVER: 0
ABDOMINAL PAIN: 0
FREQUENCY: 1
BACK PAIN: 0
FLANK PAIN: 0

## 2017-12-06 NOTE — ED AVS SNAPSHOT
[facility]    [address]    [place] [address]    Phone:  [phone]                                       Latasha Fisher   MRN: [id number]    Department:  [facility]   Date of Visit:  12/6/2017           Patient Information     Date Of Birth          1990        Your diagnoses for this visit were:     Dysuria     Candidiasis of vulva and vagina        You were seen by Kathe Kemp NP.      Follow-up Information     Follow up with Maximus Kemp MD In 3 days.    Specialty:  Family Practice    Why:  IF NOT RESOLVED    Contact information:    [facility]  [address] [facility] [address]  [phone]        Discharge References/Attachments     URINARY TRACT INFECTIONS IN WOMEN (ENGLISH)    VAGINAL INFECTION: YEAST (CANDIDIASIS) (ENGLISH)         Review of your medicines      START taking        Dose / Directions Last dose taken    fluconazole 150 MG tablet   Commonly known as:  DIFLUCAN   Quantity:  2 tablet        Take one tablet now, and one tablet in three days   Refills:  0        sulfamethoxazole-trimethoprim 800-160 MG per tablet   Commonly known as:  BACTRIM DS   Dose:  1 tablet   Quantity:  6 tablet        Take 1 tablet by mouth 2 times daily for 3 days   Refills:  0          Our records show that you are taking the medicines listed below. If these are incorrect, please call your family doctor or clinic.        Dose / Directions Last dose taken    albuterol 108 (90 BASE) MCG/ACT Inhaler   Commonly known as:  VENTOLIN HFA   Dose:  2 puff   Quantity:  1 Inhaler        Inhale 2 puffs into the lungs every 4 hours as needed for shortness of breath / dyspnea or wheezing INHALE TWO PUFFS INTO LUNGS EVERY 4 HOURS AS NEEDED FOR SHORTNESS OF BREATH, SHAKE BEFORE USING   Refills:  0        IBUPROFEN PO   Dose:  600 mg        Take 600 mg by mouth   Refills:  0        ketorolac 10 MG tablet   Commonly known as:  TORADOL   Dose:  10 mg   Quantity:   "20 tablet        Take 1 tablet (10 mg) by mouth every 6 hours as needed for moderate pain   Refills:  0        levonorgestrel-ethinyl estradiol 0.15-0.03 MG per tablet   Commonly known as:  SEASONALE   Dose:  1 tablet   Quantity:  91 tablet        Take 1 tablet by mouth daily   Refills:  3                Prescriptions were sent or printed at these locations (2 Prescriptions)                   [facility] [address] [place] [address] [place], [place] - [address] AT [facility] [address], [place] [address]    Telephone:  [phone]   Fax:  [phone]   Hours:                  E-Prescribed (2 of 2)         sulfamethoxazole-trimethoprim (BACTRIM DS) 800-160 MG per tablet               fluconazole (DIFLUCAN) 150 MG tablet                Procedures and tests performed during your visit     UA reflex to Microscopic and Culture      Orders Needing Specimen Collection     None      Pending Results     No orders found from 12/4/2017 to 12/7/2017.            Pending Culture Results     No orders found from 12/4/2017 to 12/7/2017.            Thank you for choosing [facility]       Thank you for choosing [facility] for your care. Our goal is always to provide you with excellent care. Hearing back from our patients is one way we can continue to improve our services. Please take a few minutes to complete the written survey that you may receive in the mail after you visit with us. Thank you!        [employer] Information     [employer] lets you send messages to your doctor, view your test results, renew your prescriptions, schedule appointments and more. To sign up, go to [email or web address] . Click on \"Log in\" on the left side of the screen, which will take you to the Welcome page. Then click on \"Sign up Now\" on the right side of the page.     You will be asked to enter the access code listed below, as well as some personal information. Please follow the directions to create your username and password.     Your access code " is: [email or web address]  Expires: 2017  8:52 AM     Your access code will  in 90 days. If you need help or a new code, please call your [facility] clinic or [phone].        Care EveryWhere ID     This is your Care EveryWhere ID. This could be used by other organizations to access your [facility] medical records  [id number]        Equal Access to Services     [facility] : Hadii addie moura hadasho Sokaren, waaxda luqadaha, qaybta kaalmada adeegyada, annie elizabeth . So [facility] [phone].    ATENCIÓN: Si habla español, tiene a rodriguez disposición servicios gratuitos de asistencia lingüística. Llame al [phone].    We comply with applicable federal civil rights laws and [place] laws. We do not discriminate on the basis of race, color, national origin, age, disability, sex, sexual orientation, or gender identity.            After Visit Summary       This is your record. Keep this with you and show to your community pharmacist(s) and doctor(s) at your next visit.

## 2017-12-06 NOTE — ED AVS SNAPSHOT
[facility]    [address] [address]    Phone:  [phone]                                       Latasha Fisher   MRN: [id number]    Department:  [facility]   Date of Visit:  12/6/2017           After Visit Summary Signature Page     I have received my discharge instructions, and my questions have been answered. I have discussed any challenges I see with this plan with the nurse or doctor.    ..........................................................................................................................................  Patient/Patient Representative Signature      ..........................................................................................................................................  Patient Representative Print Name and Relationship to Patient    ..................................................               ................................................  Date                                            Time    ..........................................................................................................................................  Reviewed by Signature/Title    ...................................................              ..............................................  Date                                                            Time

## 2017-12-07 NOTE — ED PROVIDER NOTES
History     Chief Complaint   Patient presents with     UTI     c/o frequency     Vaginitis     c/o odor     The history is provided by the patient. No  was used.     Latasha Fisher is a 27 year old female who presents with urinary frequency and burning for 2 days. Also has some vaginal itching and discharge intermittently after being treated for strep. Did use some monostat that helped initially but symptoms returned.     Problem List:    Patient Active Problem List    Diagnosis Date Noted     ACP (advance care planning) 09/27/2017     Priority: Medium     Advance Care Planning 9/27/2017: ACP Review of Chart / Resources Provided:  Reviewed chart for advance care plan.  Latasha Fisher has no plan or code status on file. Discussed available resources and provided with information.   Added by Ania Benitez             Introital dyspareunia 07/18/2017     Priority: Medium     Retained complete placenta 01/04/2016     Priority: Medium     Removed manually and currettaged. No excess blood loss noted       Papanicolaou smear of cervix with low grade squamous intraepithelial lesion (LGSIL) 08/25/2015     Priority: Medium     5/15.  [facility].   LGSIL, + HR HPV.  F/u pap pp.       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 06/04/2008     Priority: Medium     Pain in joint, lower leg 05/29/2008     Priority: Medium     Notalgia 08/25/2006     Priority: Medium     Overview:   IMO Update 10/11       Pes planus 08/25/2006     Priority: Medium     Overview:   IMO Update 10 2016       Scoliosis (and kyphoscoliosis),  idiopathic 07/26/2005     Priority: Medium     Overview:   Mild          Past Medical History:    Past Medical History:   Diagnosis Date     Acute pancreatitis child     Hepatitis remote     Scoliosis      Tobacco abuse        Past Surgical History:    Past Surgical History:   Procedure Laterality Date     KNEE SURGERY       LAPAROSCOPIC APPENDECTOMY  4/23/2013    Procedure: LAPAROSCOPIC APPENDECTOMY;  LAPAROSCOPIC APPENDECTOMY;  Surgeon: Paula Roberts MD;  Location: [facility]       Family History:    Family History   Problem Relation Age of Onset     Thyroid Disease Mother      DIABETES Father      Asthma Sister      Attention Deficit Disorder Brother        Social History:  Marital Status:  Single [1]  Social History   Substance Use Topics     Smoking status: Current Every Day Smoker     Packs/day: 0.50     Years: 4.00     Smokeless tobacco: Never Used      Comment: Trying to quit     Alcohol use No      Comment: occasionally when she goes out' past hx heavier consumption        Medications:      sulfamethoxazole-trimethoprim (BACTRIM DS) 800-160 MG per tablet   fluconazole (DIFLUCAN) 150 MG tablet   levonorgestrel-ethinyl estradiol (SEASONALE) 0.15-0.03 MG per tablet   ketorolac (TORADOL) 10 MG tablet   IBUPROFEN PO   albuterol (VENTOLIN HFA) 108 (90 BASE) MCG/ACT inhaler         Review of Systems   Constitutional: Negative for fever.   Gastrointestinal: Negative for abdominal pain.   Genitourinary: Positive for frequency, urgency and vaginal discharge. Negative for flank pain.   Musculoskeletal: Negative for back pain.   Skin: Negative.        Physical Exam   BP: 120/59  Heart Rate: 75  Temp: 97  F (36.1  C)  Resp: 14  SpO2: 98 %      Physical Exam   Constitutional: She is oriented to person, place, and time. She appears well-developed and well-nourished. No distress.   HENT:   Head: Normocephalic and atraumatic.   Right Ear: External ear normal.   Left Ear: External ear normal.   Eyes: Conjunctivae are normal.  No scleral icterus.   Cardiovascular: Normal rate, regular rhythm and normal heart sounds.    Pulmonary/Chest: Effort normal and breath sounds normal. No respiratory distress. She has no wheezes.   Abdominal: Bowel sounds are normal.   Musculoskeletal: Normal range of motion.   Neurological: She is alert and oriented to person, place, and time.   Skin: Skin is warm and dry. She is not diaphoretic.   Psychiatric: She has a normal mood and affect.   Nursing note and vitals reviewed.      ED Course     ED Course     Procedures     Labs Ordered and Resulted from Time of ED Arrival Up to the Time of Departure from the ED   UA MACROSCOPIC WITH REFLEX TO MICRO AND CULTURE - Abnormal; Notable for the following:        Result Value    Blood Urine Trace (*)     Leukocyte Esterase Urine Large (*)     WBC Urine 26 (*)     Bacteria Urine Few (*)     Squamous Epithelial /HPF Urine 11 (*)     Mucous Urine Present (*)     All other components within normal limits       Assessments & Plan (with Medical Decision Making)     I have reviewed the nursing notes.    I have reviewed the findings, diagnosis, plan and need for follow up with the patient.  Insurance information reviewed with pharmacist at [facility]. He is going to call to verify.   Advised pt:   Push fluids. Take medications as directed.   See PCP if symptoms not completely resolved in 3 days, sooner if symptoms worsen.   Return here if concerns develop.     New Prescriptions    FLUCONAZOLE (DIFLUCAN) 150 MG TABLET    Take one tablet now, and one tablet in three days    SULFAMETHOXAZOLE-TRIMETHOPRIM (BACTRIM DS) 800-160 MG PER TABLET    Take 1 tablet by mouth 2 times daily for 3 days       Final diagnoses:   Dysuria   Candidiasis of vulva and vagina       12/6/2017   [facility]     Kathe Kemp NP  12/06/17 1006

## 2017-12-07 NOTE — ED NOTES
Patient presents with frequency and burning with urination X 2 days.  Patient also states she has yeast infection because she was on ATB X 2 weeks ago.  She has itching and smell.  Patient did try monistat.

## 2017-12-27 ENCOUNTER — HOSPITAL ENCOUNTER (EMERGENCY)
Facility: HOSPITAL | Age: 27
Discharge: HOME | End: 2017-12-27
Attending: NURSE PRACTITIONER | Admitting: NURSE PRACTITIONER
Payer: COMMERCIAL

## 2017-12-27 VITALS
RESPIRATION RATE: 16 BRPM | OXYGEN SATURATION: 97 % | TEMPERATURE: 96.5 F | SYSTOLIC BLOOD PRESSURE: 126 MMHG | DIASTOLIC BLOOD PRESSURE: 77 MMHG

## 2017-12-27 DIAGNOSIS — N39.0 RECURRENT UTI: ICD-10-CM

## 2017-12-27 DIAGNOSIS — N30.00 ACUTE CYSTITIS WITHOUT HEMATURIA: ICD-10-CM

## 2017-12-27 LAB
ALBUMIN UR-MCNC: 30 MG/DL
APPEARANCE UR: ABNORMAL
BACTERIA #/AREA URNS HPF: ABNORMAL /HPF
BILIRUB UR QL STRIP: NEGATIVE
COLOR UR AUTO: YELLOW
GLUCOSE UR STRIP-MCNC: NEGATIVE MG/DL
HGB UR QL STRIP: ABNORMAL
KETONES UR STRIP-MCNC: NEGATIVE MG/DL
LEUKOCYTE ESTERASE UR QL STRIP: ABNORMAL
MUCOUS THREADS #/AREA URNS LPF: PRESENT /LPF
NITRATE UR QL: NEGATIVE
PH UR STRIP: 7 PH (ref 4.7–8)
RBC #/AREA URNS AUTO: 85 /HPF (ref 0–2)
SOURCE: ABNORMAL
SP GR UR STRIP: 1.01 (ref 1–1.03)
SQUAMOUS #/AREA URNS AUTO: 3 /HPF (ref 0–1)
UROBILINOGEN UR STRIP-MCNC: NORMAL MG/DL (ref 0–2)
WBC #/AREA URNS AUTO: >182 /HPF (ref 0–2)
WBC CLUMPS #/AREA URNS HPF: PRESENT /HPF

## 2017-12-27 PROCEDURE — 87088 URINE BACTERIA CULTURE: CPT | Performed by: NURSE PRACTITIONER

## 2017-12-27 PROCEDURE — 87086 URINE CULTURE/COLONY COUNT: CPT | Performed by: NURSE PRACTITIONER

## 2017-12-27 PROCEDURE — 99213 OFFICE O/P EST LOW 20 MIN: CPT

## 2017-12-27 PROCEDURE — 81001 URINALYSIS AUTO W/SCOPE: CPT | Performed by: NURSE PRACTITIONER

## 2017-12-27 PROCEDURE — 87186 SC STD MICRODIL/AGAR DIL: CPT | Performed by: NURSE PRACTITIONER

## 2017-12-27 PROCEDURE — 99213 OFFICE O/P EST LOW 20 MIN: CPT | Performed by: NURSE PRACTITIONER

## 2017-12-27 RX ORDER — FLUCONAZOLE 150 MG/1
TABLET ORAL
Qty: 2 TABLET | Refills: 0 | Status: SHIPPED | OUTPATIENT
Start: 2017-12-27 | End: 2017-12-30

## 2017-12-27 RX ORDER — NITROFURANTOIN 25; 75 MG/1; MG/1
100 CAPSULE ORAL 2 TIMES DAILY
Qty: 14 CAPSULE | Refills: 0 | Status: SHIPPED | OUTPATIENT
Start: 2017-12-27 | End: 2018-04-18

## 2017-12-27 ASSESSMENT — ENCOUNTER SYMPTOMS
CHILLS: 0
FATIGUE: 0
FLANK PAIN: 0
DYSURIA: 1
FEVER: 0
FREQUENCY: 1
APPETITE CHANGE: 0

## 2017-12-27 NOTE — ED AVS SNAPSHOT
[facility]    [address] [address]    Phone:  [phone]                                       Latasha Fisher   MRN: [id number]    Department:  [facility]   Date of Visit:  12/27/2017           Patient Information     Date Of Birth          1990        Your diagnoses for this visit were:     Recurrent UTI     Acute cystitis without hematuria        You were seen by Negar Mckeon NP.      Follow-up Information     Follow up with [facility].    Specialty:  EMERGENCY MEDICINE    Why:  As needed, If symptoms worsen, or concerns develop    Contact information:    [address] [address] [phone]    Additional information:    From [facility]: Take [address]. Turn left at [address]. Turn left at the first stoplight on [address]. At the first stop sign, take a right onto [address]. Take a left into the parking lot and continue through until you reach the North enterance of the building.       From [place]: Take [address]. Take the [address] ramp towards [place]. Turn left onto [address]. Take a slight right onto [address]. Turn left at the first stoplight on [address]. At the first stop sign, take a right onto [address]. Take a left into the parking lot and continue through until you reach the North enterance of the building.       From [place]: Take [address]. Take a right at [address]. At the first stop sign, take a right onto [address]. Take a left into the parking lot and continue through until you reach the North enterance of the building.         Follow up with Maximus Kemp MD.    Specialty:  Family Practice    Why:  As needed, if symptoms do not improve    Contact information:    [address] [place]OSO  [facility] [address]  [phone]          Follow up with Suki Sheth NP.     "Specialty:  Urology    Why:  they will call you to schedule an appointment    Contact information:    [facility]  [address] [place] [address]  [phone]          Discharge Instructions          * BLADDER INFECTION,Female (Adult)    A bladder infection (\"cystitis\" or \"UTI\") usually causes a constant urge to urinate and a burning when passing urine. Urine may be cloudy, smelly or dark. There may be pain in the lower abdomen. A bladder infection occurs when bacteria from the vaginal area enter the bladder opening (urethra). This can occur from sexual intercourse, wearing tight clothing, dehydration and other factors.  HOME CARE:  1. Drink lots of fluids (at least 6-8 glasses a day, unless you must restrict fluids for other medical reasons). This will force the medicine into your urinary system and flush the bacteria out of your body. Cranberry juice has been shown to help clear out the bacteria.  2. Avoid sexual intercourse until your symptoms are gone.  3. A bladder infection is treated with antibiotics. You may also be given Pyridium (generic = phenazopyridine) to reduce the burning sensation. This medicine will cause your urine to become a bright orange color. The orange urine may stain clothing. You may wear a pad or panty-liner to protect clothing.  PREVENTING FUTURE INFECTIONS:  1. Always wipe from front to back after a bowel movement.  2. Keep the genital area clean and dry.  3. Drink plenty of fluids each day to avoid dehydration.  4. Urinate right after intercourse to flush out the bladder.  5. Wear cotton underwear and cotton-lined panty hose; avoid tight-fitting pants.  6. If you are on birth control pills and are having frequent bladder infections, discuss with your doctor.  FOLLOW UP: Return to this facility or see your doctor if ALL symptoms are not gone after three days of treatment.  GET PROMPT MEDICAL ATTENTION if any of the following occur:    Fever over 101 F (38.3 C)    No " improvement by the third day of treatment    Increasing back or abdominal pain    Repeated vomiting; unable to keep medicine down    Weakness, dizziness or fainting    Vaginal discharge    Pain, redness or swelling in the labia (outer vaginal area)    9581-8415 [employer]. [address] [place] [address], [place] [address]. All rights reserved. This information is not intended as a substitute for professional medical care. Always follow your healthcare professional's instructions.  This information has been modified by your health care provider with permission from the publisher.      ED Discharge Orders     UROLOGY ADULT REFERRAL       Your provider has referred you to:  Tricia Sheth     Please be aware that coverage of these services is subject to the terms and limitations of your health insurance plan.  Call member services at your health plan with any benefit or coverage questions.      Please bring the following with you to your appointment:    (1) Any X-Rays, CTs or MRIs which have been performed.  Contact the facility where they were done to arrange for  prior to your scheduled appointment.    (2) List of current medications  (3) This referral request   (4) Any documents/labs given to you for this referral                     Review of your medicines      START taking        Dose / Directions Last dose taken    fluconazole 150 MG tablet   Commonly known as:  DIFLUCAN   Quantity:  2 tablet        Take one tablet now, and one tablet in three days   Refills:  0        nitroFURantoin (macrocrystal-monohydrate) 100 MG capsule   Commonly known as:  MACROBID   Dose:  100 mg   Quantity:  14 capsule        Take 1 capsule (100 mg) by mouth 2 times daily   Refills:  0          Our records show that you are taking the medicines listed below. If these are incorrect, please call your family doctor or clinic.        Dose / Directions Last dose taken    albuterol 108 (90 BASE) MCG/ACT Inhaler   Commonly known  as:  VENTOLIN HFA   Dose:  2 puff   Quantity:  1 Inhaler        Inhale 2 puffs into the lungs every 4 hours as needed for shortness of breath / dyspnea or wheezing INHALE TWO PUFFS INTO LUNGS EVERY 4 HOURS AS NEEDED FOR SHORTNESS OF BREATH, SHAKE BEFORE USING   Refills:  0        IBUPROFEN PO   Dose:  600 mg        Take 600 mg by mouth   Refills:  0        levonorgestrel-ethinyl estradiol 0.15-0.03 MG per tablet   Commonly known as:  SEASONALE   Dose:  1 tablet   Quantity:  91 tablet        Take 1 tablet by mouth daily   Refills:  3                Prescriptions were sent or printed at these locations (2 Prescriptions)                   [facility] - [place], [place] - [address] AT [facility] [address]   [address], [place] [address]    Telephone:  [phone]   Fax:  [phone]   Hours:                  E-Prescribed (2 of 2)         nitroFURantoin, macrocrystal-monohydrate, (MACROBID) 100 MG capsule               fluconazole (DIFLUCAN) 150 MG tablet                Procedures and tests performed during your visit     UA with Microscopic reflex to Culture    Urine Culture Aerobic Bacterial      Orders Needing Specimen Collection     None      Pending Results     Date and Time Order Name Status Description    12/27/2017 2037 Urine Culture Aerobic Bacterial In process             Pending Culture Results     Date and Time Order Name Status Description    12/27/2017 2037 Urine Culture Aerobic Bacterial In process             Thank you for choosing [facility]       Thank you for choosing [facility] for your care. Our goal is always to provide you with excellent care. Hearing back from our patients is one way we can continue to improve our services. Please take a few minutes to complete the written survey that you may receive in the mail after you visit with us. Thank you!        [employer] Information     [email or web address] lets you send messages to your doctor, view your test results, renew your prescriptions,  "schedule appointments and more. To sign up, go to [email or web address] . Click on \"Log in\" on the left side of the screen, which will take you to the Welcome page. Then click on \"Sign up Now\" on the right side of the page.     You will be asked to enter the access code listed below, as well as some personal information. Please follow the directions to create your username and password.     Your access code is: [email or web address]  Expires: 3/27/2018  8:52 PM     Your access code will  in 90 days. If you need help or a new code, please call your [facility] clinic or [phone].        Care EveryWhere ID     This is your Care EveryWhere ID. This could be used by other organizations to access your [facility] medical records  [id number]        Equal Access to Services     RADHA GRESHAM : Sobia Romo, tobi cordero, duc berry, annie elizabeth . So [facility] [phone].    ATENCIÓN: Si habla español, tiene a rodriguez disposición servicios gratuitos de asistencia lingüística. Llame al [phone].    We comply with applicable federal civil rights laws and [place] laws. We do not discriminate on the basis of race, color, national origin, age, disability, sex, sexual orientation, or gender identity.            After Visit Summary       This is your record. Keep this with you and show to your community pharmacist(s) and doctor(s) at your next visit.                  "

## 2017-12-27 NOTE — ED AVS SNAPSHOT
[facility]    [address] [address]    Phone:  [phone]                                       Latasha Fisher   MRN: [id number]    Department:  [facility]   Date of Visit:  12/27/2017           After Visit Summary Signature Page     I have received my discharge instructions, and my questions have been answered. I have discussed any challenges I see with this plan with the nurse or doctor.    ..........................................................................................................................................  Patient/Patient Representative Signature      ..........................................................................................................................................  Patient Representative Print Name and Relationship to Patient    ..................................................               ................................................  Date                                            Time    ..........................................................................................................................................  Reviewed by Signature/Title    ...................................................              ..............................................  Date                                                            Time

## 2017-12-28 NOTE — ED PROVIDER NOTES
History     Chief Complaint   Patient presents with     UTI     c/o frequency and burning     The history is provided by the patient. No  was used.     Latasha Fisher is a 27 year old female who presents today with a CC of dysuria x 1-2 days.  She notes she has recurrent UTIs, approximately 10 per year for several years.  She was seen by urology as a child, she has not had any recent follow up.  No fevers or chills.  Appetite has been normal.  She has been pushing fluids.  She has not taken anything for symptoms.  She is currently sexually active, she denies concern for STI.  She is on BCP, denies concern for pregnancy.      Problem List:    Patient Active Problem List    Diagnosis Date Noted     ACP (advance care planning) 09/27/2017     Priority: Medium     Advance Care Planning 9/27/2017: ACP Review of Chart / Resources Provided:  Reviewed chart for advance care plan.  Latasha Fisher has no plan or code status on file. Discussed available resources and provided with information.   Added by Ania Benitez             Introital dyspareunia 07/18/2017     Priority: Medium     Retained complete placenta 01/04/2016     Priority: Medium     Removed manually and currettaged. No excess blood loss noted       Papanicolaou smear of cervix with low grade squamous intraepithelial lesion (LGSIL) 08/25/2015     Priority: Medium     5/15.  [facility].   LGSIL, + HR HPV.  F/u pap pp.       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 06/04/2008     Priority: Medium     Pain in joint, lower leg 05/29/2008     Priority: Medium      Notalgia 08/25/2006     Priority: Medium     Overview:   IMO Update 10/11       Pes planus 08/25/2006     Priority: Medium     Overview:   IMO Update 10 2016       Scoliosis (and kyphoscoliosis), idiopathic 07/26/2005     Priority: Medium     Overview:   Mild          Past Medical History:    Past Medical History:   Diagnosis Date     Acute pancreatitis child     Hepatitis remote     Scoliosis      Tobacco abuse        Past Surgical History:    Past Surgical History:   Procedure Laterality Date     KNEE SURGERY       LAPAROSCOPIC APPENDECTOMY  4/23/2013    Procedure: LAPAROSCOPIC APPENDECTOMY;  LAPAROSCOPIC APPENDECTOMY;  Surgeon: Paula Roberts MD;  Location: [facility]       Family History:    Family History   Problem Relation Age of Onset     Thyroid Disease Mother      DIABETES Father      Asthma Sister      Attention Deficit Disorder Brother        Social History:  Marital Status:  Single [1]  Social History   Substance Use Topics     Smoking status: Current Every Day Smoker     Packs/day: 0.50     Years: 4.00     Smokeless tobacco: Never Used      Comment: Trying to quit     Alcohol use No      Comment: occasionally when she goes out' past hx heavier consumption        Medications:      nitroFURantoin, macrocrystal-monohydrate, (MACROBID) 100 MG capsule   fluconazole (DIFLUCAN) 150 MG tablet   levonorgestrel-ethinyl estradiol (SEASONALE) 0.15-0.03 MG per tablet   IBUPROFEN PO   albuterol (VENTOLIN HFA) 108 (90 BASE) MCG/ACT inhaler         Review of Systems   Constitutional: Negative for appetite change, chills, fatigue and fever.   Genitourinary: Positive for dysuria, frequency and urgency. Negative for flank pain, genital sores, menstrual problem (currently menses), pelvic pain, vaginal discharge and vaginal pain.       Physical Exam   BP: 126/77  Heart Rate: 86  Temp: 96.5  F (35.8  C)  Resp: 16  SpO2: 97 %      Physical Exam   Constitutional: She is oriented to person, place, and time. She appears  well-developed and well-nourished. She is cooperative. She does not appear ill.   HENT:   Head: Normocephalic and atraumatic.   Eyes: Conjunctivae are normal.   Neck: Normal range of motion. Neck supple.   Cardiovascular: Normal rate and regular rhythm.    Pulmonary/Chest: Effort normal and breath sounds normal.   Abdominal: Soft. Bowel sounds are normal. There is no hepatosplenomegaly. There is no tenderness. There is no CVA tenderness.   Neurological: She is alert and oriented to person, place, and time.   Skin: Skin is warm and dry.   Psychiatric: She has a normal mood and affect. Her behavior is normal.   Nursing note and vitals reviewed.      ED Course     ED Course     Procedures    Results for orders placed or performed during the hospital encounter of 12/27/17   UA with Microscopic reflex to Culture   Result Value Ref Range    Color Urine Yellow     Appearance Urine Cloudy     Glucose Urine Negative NEG^Negative mg/dL    Bilirubin Urine Negative NEG^Negative    Ketones Urine Negative NEG^Negative mg/dL    Specific Gravity Urine 1.014 1.003 - 1.035    Blood Urine Moderate (A) NEG^Negative    pH Urine 7.0 4.7 - 8.0 pH    Protein Albumin Urine 30 (A) NEG^Negative mg/dL    Urobilinogen mg/dL Normal 0.0 - 2.0 mg/dL    Nitrite Urine Negative NEG^Negative    Leukocyte Esterase Urine Large (A) NEG^Negative    Source Midstream Urine     WBC Urine >182 (H) 0 - 2 /HPF    RBC Urine 85 (H) 0 - 2 /HPF    WBC Clumps Present (A) NEG^Negative /HPF    Bacteria Urine Moderate (A) NEG^Negative /HPF    Squamous Epithelial /HPF Urine 3 (H) 0 - 1 /HPF    Mucous Urine Present (A) NEG^Negative /LPF   Urine Culture Aerobic Bacterial   Result Value Ref Range    Specimen Description Midstream Urine     Culture Micro (A)      >100,000 colonies/mL  Gram negative rods  Identification and susceptibility to follow.         Assessments & Plan (with Medical Decision Making)     I have reviewed the nursing notes.    I have reviewed the  findings, diagnosis, plan and need for follow up with the patient.  ASSESSMENT / PLAN:  (N39.0) Recurrent UTI  Comment: reports around 10 per year x several years  Plan:  UROLOGY ADULT REFERRAL           (N30.00) Acute cystitis without hematuria  Comment: symptomatic  Plan:  Drink lots of fluids (at least 6-8 glasses a day, unless you must restrict fluids for other medical reasons).    Avoid sexual intercourse until your symptoms are gone   Macrobid as prescribed   Pyridium as needed for 1-2 days   PREVENTING FUTURE INFECTIONS:   Always wipe from front to back after a bowel movement. Keep the genital area clean and dry.    Drink plenty of fluids each day to avoid dehydration.  Urinate right after intercourse to flush out the bladder.     Wear cotton underwear and cotton-lined panty hose; avoid tight-fitting pants.   FOLLOW UP Return to this facility or see your doctor if ALL symptoms are not gone after three days of treatment.   GET PROMPT MEDICAL ATTENTION if any of the following occur:     Fever over 101 F (38.3 C), No improvement by the third day of treatment, Increasing back or abdominal pain, vomiting,    unable to keep fluids or medicine down, weakness, dizziness, or fainting, vaginal discharge, pain, redness, or swelling of the vaginal area        Discharge Medication List as of 12/27/2017  8:52 PM      START taking these medications    Details   nitroFURantoin, macrocrystal-monohydrate, (MACROBID) 100 MG capsule Take 1 capsule (100 mg) by mouth 2 times daily, Disp-14 capsule, R-0, E-Prescribe      fluconazole (DIFLUCAN) 150 MG tablet Take one tablet now, and one tablet in three days, Disp-2 tablet, R-0, E-Prescribe             Final diagnoses:   Recurrent UTI   Acute cystitis without hematuria       12/27/2017   [facility]     Negar Mckeon NP  12/28/17 [id number]

## 2017-12-28 NOTE — DISCHARGE INSTRUCTIONS
"   * BLADDER INFECTION,Female (Adult)    A bladder infection (\"cystitis\" or \"UTI\") usually causes a constant urge to urinate and a burning when passing urine. Urine may be cloudy, smelly or dark. There may be pain in the lower abdomen. A bladder infection occurs when bacteria from the vaginal area enter the bladder opening (urethra). This can occur from sexual intercourse, wearing tight clothing, dehydration and other factors.  HOME CARE:  1. Drink lots of fluids (at least 6-8 glasses a day, unless you must restrict fluids for other medical reasons). This will force the medicine into your urinary system and flush the bacteria out of your body. Cranberry juice has been shown to help clear out the bacteria.  2. Avoid sexual intercourse until your symptoms are gone.  3. A bladder infection is treated with antibiotics. You may also be given Pyridium (generic = phenazopyridine) to reduce the burning sensation. This medicine will cause your urine to become a bright orange color. The orange urine may stain clothing. You may wear a pad or panty-liner to protect clothing.  PREVENTING FUTURE INFECTIONS:  1. Always wipe from front to back after a bowel movement.  2. Keep the genital area clean and dry.  3. Drink plenty of fluids each day to avoid dehydration.  4. Urinate right after intercourse to flush out the bladder.  5. Wear cotton underwear and cotton-lined panty hose; avoid tight-fitting pants.  6. If you are on birth control pills and are having frequent bladder infections, discuss with your doctor.  FOLLOW UP: Return to this facility or see your doctor if ALL symptoms are not gone after three days of treatment.  GET PROMPT MEDICAL ATTENTION if any of the following occur:    Fever over 101 F (38.3 C)    No improvement by the third day of treatment    Increasing back or abdominal pain    Repeated vomiting; unable to keep medicine down    Weakness, dizziness or fainting    Vaginal discharge    Pain, redness or swelling in " the labia (outer vaginal area)    0712-9083 [employer]. [address] [place] [address], [place] [address]. All rights reserved. This information is not intended as a substitute for professional medical care. Always follow your healthcare professional's instructions.  This information has been modified by your health care provider with permission from the publisher.

## 2017-12-28 NOTE — ED NOTES
Pt presents today alone for c/o s/s of UTI had one earlier this month says she has gotten them all her life.

## 2017-12-29 LAB
BACTERIA SPEC CULT: ABNORMAL
SPECIMEN SOURCE: ABNORMAL

## 2017-12-29 NOTE — PROGRESS NOTES
Urine culture with susceptibility report shows the bacteria cultured, Escherichia coli, is susceptible to the Macrobid prescribed by BRAYDON Mckeon NP during pt's 12/27/17 Urgent Care visit.

## 2018-01-02 ENCOUNTER — OFFICE VISIT (OUTPATIENT)
Dept: UROLOGY | Facility: OTHER | Age: 28
End: 2018-01-02
Attending: NURSE PRACTITIONER
Payer: COMMERCIAL

## 2018-01-02 VITALS
HEIGHT: 65 IN | SYSTOLIC BLOOD PRESSURE: 100 MMHG | HEART RATE: 72 BPM | WEIGHT: 136 LBS | DIASTOLIC BLOOD PRESSURE: 62 MMHG | OXYGEN SATURATION: 96 % | BODY MASS INDEX: 22.66 KG/M2 | TEMPERATURE: 96.2 F

## 2018-01-02 DIAGNOSIS — N39.0 RECURRENT UTI: ICD-10-CM

## 2018-01-02 DIAGNOSIS — K62.9 FECAL INCONTINENCE DUE TO ANORECTAL DISORDER: Primary | ICD-10-CM

## 2018-01-02 DIAGNOSIS — R15.9 FECAL INCONTINENCE DUE TO ANORECTAL DISORDER: Primary | ICD-10-CM

## 2018-01-02 PROCEDURE — G0463 HOSPITAL OUTPT CLINIC VISIT: HCPCS | Mod: 25

## 2018-01-02 PROCEDURE — 99214 OFFICE O/P EST MOD 30 MIN: CPT | Performed by: NURSE PRACTITIONER

## 2018-01-02 PROCEDURE — 51798 US URINE CAPACITY MEASURE: CPT

## 2018-01-02 ASSESSMENT — PAIN SCALES - GENERAL: PAINLEVEL: MILD PAIN (2)

## 2018-01-02 NOTE — PROGRESS NOTES
Patient is seen in consultation for referring ER provide and PCP Dr. Kemp    CC: Recurrent urinary tract infections.    HPI: Ms. Latasha Fisher is a very pleasant 27 year old female seen today in the urology clinic for recurrent urinary tract infections.  When she feels like she has an infection she notices frequency, urgency, and some burning.  She saw a urologist in [place] when she was about 10 years old (also had an issue with bed wetting).  As far as she remembers she had a cystoscopy and it was negative.  She might have a small bladder.    Her last urinalysis and urine culture tests include:  7/16/15- mixed riccardo  17- normal riccardo  11/27/15- negative  12/11/15- ,000 streptococcus agalactiae  16- negative  16- 10-50,000 streptococcus agalactiae  3/24/16- negative  17- negative  17- >100,000 E coli    Age of first UTI: 10 years  Frequency of infections: almost monthly  Triggers: possible sex  History of urolithiasis: no  Pneumaturia: no  Abdominal pain: one episode recently with lower abd pain  Constipation: no  Wipes front to back: yes  Cranberry supplements: no  History of antibiotic prophylaxis in the past: no  Fluid consumption per day: 4 cups per day of water and a pot of coffee/day  Voiding before and after intercourse: yes    OBSTETRIC HISTORY:  The patient is .    The weight of her largest baby was 7 lbs 14oz.    Babies were delivered by vaginal presentation.  Periods are regular (birth control)  Has not noticed a bulge in the vaginal area.     Past Medical History:   Diagnosis Date     Acute pancreatitis child    post treuma, from a fall     Hepatitis remote    transient, probably from alcohol     Scoliosis      Tobacco abuse      Past Surgical History:   Procedure Laterality Date     KNEE SURGERY       LAPAROSCOPIC APPENDECTOMY  2013    Procedure: LAPAROSCOPIC APPENDECTOMY;  LAPAROSCOPIC APPENDECTOMY;  Surgeon: Paula Roberts MD;  Location: [facility]  "    Current Outpatient Prescriptions   Medication Sig Dispense Refill     nitroFURantoin, macrocrystal-monohydrate, (MACROBID) 100 MG capsule Take 1 capsule (100 mg) by mouth 2 times daily 14 capsule 0     levonorgestrel-ethinyl estradiol (SEASONALE) 0.15-0.03 MG per tablet Take 1 tablet by mouth daily 91 tablet 3     IBUPROFEN PO Take 600 mg by mouth       albuterol (VENTOLIN HFA) 108 (90 BASE) MCG/ACT inhaler Inhale 2 puffs into the lungs every 4 hours as needed for shortness of breath / dyspnea or wheezing INHALE TWO PUFFS INTO LUNGS EVERY 4 HOURS AS NEEDED FOR SHORTNESS OF BREATH, SHAKE BEFORE USING 1 Inhaler 0     No Known Allergies  Family History: There is no h/o  carcinoma.  There is no h/o urolithiasis.    Social History: The patient does smoke cigarettes, social EtOH and no illicit drug use.  She is single.    Review Of Systems  Skin: negative  Eyes: glasses  Ears/Nose/Throat: negative  Respiratory: No shortness of breath, dyspnea on exertion, cough, or hemoptysis, does have seasonal asthma  Cardiovascular: negative  Gastrointestinal: rectal leakage  Genitourinary: as above, and a hx of polycystic kidneys  Musculoskeletal: negative  Neurologic: headaches  Psychiatric: negative  Hematologic/Lymphatic/Immunologic: negative  Endocrine: negative    PHYSICAL EXAM:   Vital signs:  Temp: 96.2  F (35.7  C) Temp src: Tympanic BP: 100/62 Pulse: 72     SpO2: 96 %     Height: 165.1 cm (5' 5\") Weight: 61.7 kg (136 lb)  Estimated body mass index is 22.63 kg/(m^2) as calculated from the following:    Height as of this encounter: 1.651 m (5' 5\").    Weight as of this encounter: 61.7 kg (136 lb).  GENERAL: Well groomed/well developed/well nourished female in no acute distress.  HEENT: extra ocular movements intact, atraumatic, normocephalic.  SKIN: Warm to touch, dry.  No visible rashes or lesions.  RESP: No increased respiratory effort.  CV: RRR, no murmurs/rubs/gallops.  LYMPH: No lowe extremity edema.  ABD: Soft, non " tender, non distended, no palpable masses.  Normoactive bowel sounds.  MS: Full range of motion in extremities.  PELVIC: Dr. Hector did a pelvic exam in the last few months to check the patient's episiotomy (for rectal leakage issues), and the exam was normal.  NEURO: Alert and oriented x 3.  PSYCH: Normal mood and affect, pleasant and agreeable during interview and exam.    Admission on 12/27/2017, Discharged on 12/27/2017   Component Date Value Ref Range Status     Color Urine 12/27/2017 Yellow   Final     Appearance Urine 12/27/2017 Cloudy   Final     Glucose Urine 12/27/2017 Negative  NEG^Negative mg/dL Final     Bilirubin Urine 12/27/2017 Negative  NEG^Negative Final     Ketones Urine 12/27/2017 Negative  NEG^Negative mg/dL Final     Specific Gravity Urine 12/27/2017 1.014  1.003 - 1.035 Final     Blood Urine 12/27/2017 Moderate* NEG^Negative Final     pH Urine 12/27/2017 7.0  4.7 - 8.0 pH Final     Protein Albumin Urine 12/27/2017 30* NEG^Negative mg/dL Final     Urobilinogen mg/dL 12/27/2017 Normal  0.0 - 2.0 mg/dL Final     Nitrite Urine 12/27/2017 Negative  NEG^Negative Final     Leukocyte Esterase Urine 12/27/2017 Large* NEG^Negative Final     Source 12/27/2017 Midstream Urine   Final     WBC Urine 12/27/2017 >182* 0 - 2 /HPF Final     RBC Urine 12/27/2017 85* 0 - 2 /HPF Final     WBC Clumps 12/27/2017 Present* NEG^Negative /HPF Final     Bacteria Urine 12/27/2017 Moderate* NEG^Negative /HPF Final     Squamous Epithelial /HPF Urine 12/27/2017 3* 0 - 1 /HPF Final     Mucous Urine 12/27/2017 Present* NEG^Negative /LPF Final     Specimen Description 12/27/2017 Midstream Urine   Final     Culture Micro 12/27/2017 *  Final                    Value:>100,000 colonies/mL  Escherichia coli       RU: Postvoid residual urine by ultrasound was 0 cc today.     ASSESSMENT/PLAN:  Ms. Latasha Fisher is a very pleasant 27 year old female with a history of recurrent urinary tract infections.   At this time it is still  difficult to differentiate between whether or not her symptoms are a result primarily of over active bladder symptoms, or genuine urinary tract infections because she has a little of both.  For patients with recurrent UTIs, most are likely re-infections rather than a persistent infection not clearing.  Treatment options include observation with treatment of acute episodes, self start antibiotics, postcoital antibiotic prophylaxis, or a daily low dose antibiotic prophylaxis.  We discussed the possible treatment options in detail today, and the patient has elected to proceed with treating acute infections.      - Lifestyle and hygiene modifications were reviewed today in clinic, including wiping front to back, wearing cotton breathable underwear, voiding before and after intercourse to flush the urethra, minimizing baths and opting for showers, and appropriate perineal hygiene.  - Push fluids to keep the urine dilute.   - Cranberry supplements or vitamin C are viable supplemental treatment options in attempts of acidifying the urine to make the bladder less inhabitable for bacteria. (we will hold on this for now, just in case she does have over active bladder )  - Should the patient develop symptoms consistent with a urinary tract infection in the interim, I have asked her to drop a urine specimen off at any [place] clinic for convenience.  A standing UA/UC order has been entered.  We reviewed the importance of attempting to always leave a urine specimen when symptomatic to trend frequency of infections, causative organisms and drug sensitivities.    -She will eliminate bladder irritants in her diet (drinks a pot of coffee/day)    She also agrees to pelvic floor physical therapy to see if this will help to decrease her anal leakage, which could cause her urinary tract infections.  She will return as needed.  If she has no more infections and her symptoms are controlled with diet and exercise she may not need further  intervention.    I have enjoyed participating in the medical care of this very pleasant patient.  Using layterms, and diagrams when needed, I explained the pathophysiology, usual course, potential complications, recommended monitoring, preventive modalities, treatment rationale, risks, and benefits to Ms. Latasha Fisher. The patient appeared to understand and all questions were satisfactorily answered.      Suki Sheth  CNP, CWOCN  Urology and Wound Care  January 2, 2018

## 2018-01-02 NOTE — MR AVS SNAPSHOT
After Visit Summary   1/2/2018    Latasha Fisher    MRN: [id number]           Patient Information     Date Of Birth          1990        Visit Information        Provider Department      1/2/2018 2:00 PM Suki Sheth NP [facility]        Today's Diagnoses     Fecal incontinence due to anorectal disorder    -  1    Recurrent UTI          Care Instructions    Below is a list of foods that can irritate the bladder and that I recommend the patient limit or avoid.      Caffeinated soft drinks.    Coffee.    Tea.    Chocolate.    Tomato-based foods.    Acidic juices and fruits.    Alcohol.    Carbonated drinks.    Aspartame/Nutrasweet.    For patients with recurrent UTIs, most are likely re-infections rather than a persistent infection not clearing.  Treatment options include observation with treatment of acute episodes, self start antibiotics, postcoital (after sexual intercourse) antibiotic prophylaxis, or a daily low dose antibiotic prophylaxis.    Today, we discussed the possible treatment options , and you have elected to proceed with treat acute episodes.    - We reviewed lifestyle and hygiene modifications  today, including wiping front to back, wearing cotton breathable underwear, voiding before and after intercourse to flush the urethra, minimizing baths and opting for showers, and appropriate perineal hygiene.  It is best to cleanse your bottom after each bowel movement with a squeeze bottle with warm soapy water in it.  You may also use wet wipes with no scent or lotion, or a wet washcloth or toilet tissue.  For soaps I prefer to recommend a Ph balanced soap such as [employer]orent[employer]'s baby wash or Cetaphil.    - Push fluids to keep the urine dilute.   - Cranberry supplements or vitamin C are viable supplemental treatment options in attempts of acidifying the urine to make the bladder less inhabitable for bacteria.  - If you develop symptoms consistent with a urinary tract  infection in the interim, I have asked you to drop a urine specimen off at any [facility] for convenience.  A standing UA/UC order has been entered.   We reviewed the importance of attempting to always leave a urine specimen when symptomatic to trend frequency of infections, causative organisms and drug sensitivities.                Follow-ups after your visit        Additional Services     PHYSICAL THERAPY REFERRAL       *This order will print in the [facility] Central Scheduling Office*    [facility] provides Physical Therapy evaluation and treatment and many specialty services across the [place] system.  If requesting a specialty program, please choose from the list below.    Call [phone] to schedule [facility] at all locations, with the exception of [facility], please call [phone].     Treatment: Evaluation & Treatment  Special Instructions/Modalities: Please schedule with Luisa Huber at [facility]  Special Programs: Incontinence Pelvic Floor Program  Indication/Reason for Referral: Women's Health (Please Complete Special Programs SmartList)  Onset of Illness:  Since birth of first child 7 years ago  Therapy Orders:  Evaluate and Treat  Special Programs:  None and Women's Health: Pelvic Floor Weakness: Incontinence: fecal (minimal leakage anally- causes urinary tract infections), since birth of children and  Exercise/HEP  Special Request:  None    Additional Comments for the therapist or chiropractor:  None, see above    Please be aware that coverage of these services is subject to the terms and limitations of your health insurance plan.  Call member services at your health plan with any benefit or coverage questions.      Please bring the following to your appointment:    >>   Your personal calendar for scheduling future appointments  >>   Comfortable clothing    Please be aware that coverage of these services is  "subject to the terms and limitations of your health insurance plan.  Call member services at your health plan with any benefit or coverage questions.      **Note to Provider** To refer patients to therapy outside of the location list, change the order class to External Referral in the order composer.                  Future tests that were ordered for you today     Open Standing Orders        Priority Remaining Interval Expires Ordered    UA reflex to Microscopic and Culture Routine 3/3  2019 2018            Who to contact     If you have questions or need follow up information about today's clinic visit or your schedule please contact [facility] directly at [phone].  Normal or non-critical lab and imaging results will be communicated to you by Testthart, letter or phone within 4 business days after the clinic has received the results. If you do not hear from us within 7 days, please contact the clinic through [employer] or phone. If you have a critical or abnormal lab result, we will notify you by phone as soon as possible.  Submit refill requests through [employer] or call your pharmacy and they will forward the refill request to us. Please allow 3 business days for your refill to be completed.          Additional Information About Your Visit        [employer] Information     [employer] lets you send messages to your doctor, view your test results, renew your prescriptions, schedule appointments and more. To sign up, go to [email or web address] . Click on \"Log in\" on the left side of the screen, which will take you to the Welcome page. Then click on \"Sign up Now\" on the right side of the page.     You will be asked to enter the access code listed below, as well as some personal information. Please follow the directions to create your username and password.     Your access code is: [email or web address]  Expires: 3/27/2018  8:52 PM     Your access code will  in 90 days. If you need help or a new code, " "please call your [place] clinic or [phone].        Care EveryWhere ID     This is your Care EveryWhere ID. This could be used by other organizations to access your [place] medical records  [id number]        Your Vitals Were     Pulse Temperature Height Pulse Oximetry BMI (Body Mass Index)       72 96.2  F (35.7  C) (Tympanic) 1.651 m (5' 5\") 96% 22.63 kg/m2        Blood Pressure from Last 3 Encounters:   01/02/18 100/62   12/27/17 126/77   12/06/17 120/59    Weight from Last 3 Encounters:   01/02/18 61.7 kg (136 lb)   11/17/17 63.5 kg (140 lb)   09/29/17 59 kg (130 lb)              We Performed the Following     PHYSICAL THERAPY REFERRAL        Primary Care Provider Office Phone # Fax #    Maximus Kemp [phone]       [address]  [facility] [place] [address]        Equal Access to Services     [facility] : Hadii aad ku hadasho Soomaali, waaxda luqadaha, qaybta kaalmada adeegyada, waxay idiin hayaan adeeg kharacinda la'boubacarn . So [facility] [phone].    ATENCIÓN: Si habla español, tiene a rodriguez disposición servicios gratuitos de asistencia lingüística. Llame al [phone].    We comply with applicable federal civil rights laws and [place] laws. We do not discriminate on the basis of race, color, national origin, age, disability, sex, sexual orientation, or gender identity.            Thank you!     Thank you for choosing [facility]] [place]  for your care. Our goal is always to provide you with excellent care. Hearing back from our patients is one way we can continue to improve our services. Please take a few minutes to complete the written survey that you may receive in the mail after your visit with us. Thank you!             Your Updated Medication List - Protect others around you: Learn how to safely use, store and throw away your medicines at www.disposemymeds.org.          This list is accurate as of: 1/2/18  3:13 PM.  Always use your most recent med list.    "                Brand Name Dispense Instructions for use Diagnosis    albuterol 108 (90 BASE) MCG/ACT Inhaler    VENTOLIN HFA    1 Inhaler    Inhale 2 puffs into the lungs every 4 hours as needed for shortness of breath / dyspnea or wheezing INHALE TWO PUFFS INTO LUNGS EVERY 4 HOURS AS NEEDED FOR SHORTNESS OF BREATH, SHAKE BEFORE USING    Unspecified asthma(493.90)       IBUPROFEN PO      Take 600 mg by mouth        levonorgestrel-ethinyl estradiol 0.15-0.03 MG per tablet    SEASONALE    91 tablet    Take 1 tablet by mouth daily    Encounter for initial prescription of contraceptive pills       nitroFURantoin (macrocrystal-monohydrate) 100 MG capsule    MACROBID    14 capsule    Take 1 capsule (100 mg) by mouth 2 times daily

## 2018-01-02 NOTE — PATIENT INSTRUCTIONS
Below is a list of foods that can irritate the bladder and that I recommend the patient limit or avoid.      Caffeinated soft drinks.    Coffee.    Tea.    Chocolate.    Tomato-based foods.    Acidic juices and fruits.    Alcohol.    Carbonated drinks.    Aspartame/Nutrasweet.    For patients with recurrent UTIs, most are likely re-infections rather than a persistent infection not clearing.  Treatment options include observation with treatment of acute episodes, self start antibiotics, postcoital (after sexual intercourse) antibiotic prophylaxis, or a daily low dose antibiotic prophylaxis.    Today, we discussed the possible treatment options , and you have elected to proceed with treat acute episodes.    - We reviewed lifestyle and hygiene modifications  today, including wiping front to back, wearing cotton breathable underwear, voiding before and after intercourse to flush the urethra, minimizing baths and opting for showers, and appropriate perineal hygiene.  It is best to cleanse your bottom after each bowel movement with a squeeze bottle with warm soapy water in it.  You may also use wet wipes with no scent or lotion, or a wet washcloth or toilet tissue.  For soaps I prefer to recommend a Ph balanced soap such as [employer]'s baby wash or Cetaphil.    - Push fluids to keep the urine dilute.   - Cranberry supplements or vitamin C are viable supplemental treatment options in attempts of acidifying the urine to make the bladder less inhabitable for bacteria.  - If you develop symptoms consistent with a urinary tract infection in the interim, I have asked you to drop a urine specimen off at any [place] clinic for convenience.  A standing UA/UC order has been entered.   We reviewed the importance of attempting to always leave a urine specimen when symptomatic to trend frequency of infections, causative organisms and drug sensitivities.

## 2018-01-02 NOTE — NURSING NOTE
"Chief Complaint   Patient presents with     Urinary Problem     Recurrent UTI's; Negar Mckeon referring       Initial /62 (BP Location: Right arm, Cuff Size: Adult Regular)  Pulse 72  Temp 96.2  F (35.7  C) (Tympanic)  Ht 1.651 m (5' 5\")  Wt 61.7 kg (136 lb)  SpO2 96%  BMI 22.63 kg/m2 Estimated body mass index is 22.63 kg/(m^2) as calculated from the following:    Height as of this encounter: 1.651 m (5' 5\").    Weight as of this encounter: 61.7 kg (136 lb).  Medication Reconciliation: complete   Brenda Barahona LPN      "

## 2018-01-18 ENCOUNTER — HOSPITAL ENCOUNTER (OUTPATIENT)
Dept: PHYSICAL THERAPY | Facility: HOSPITAL | Age: 28
Setting detail: THERAPIES SERIES
End: 2018-01-18
Attending: NURSE PRACTITIONER
Payer: COMMERCIAL

## 2018-01-18 PROCEDURE — 40000718 ZZHC STATISTIC PT DEPARTMENT ORTHO VISIT

## 2018-01-18 PROCEDURE — 97162 PT EVAL MOD COMPLEX 30 MIN: CPT | Mod: GP

## 2018-01-18 PROCEDURE — 97110 THERAPEUTIC EXERCISES: CPT | Mod: GP

## 2018-01-18 NOTE — PROGRESS NOTES
" 01/18/18 0800   General Information   Type of Visit Initial OP Ortho PT Evaluation   Start of Care Date 01/18/18   Referring Physician BROOK Sheth   Orders Evaluate and Treat   Date of Order 01/02/18   Insurance Type [employer]   Insurance Comments/Visits Authorized auth emailed   Medical Diagnosis Pelvic floor dysfunction,   Surgical/Medical history reviewed Yes   Body Part(s)   Body Part(s) Pelvic Floor Dysfunction   Presentation and Etiology   Pertinent history of current problem (include personal factors and/or comorbidities that impact the POC) This 26 y/o female referred to PT from urology d/t UTI and \"Leaky\" stool. Pt was diagnosed with no UTI but had UTI like symptoms. She has recurrent UTIs over her whole life and diagnosed with Over active bladder. Pt reports she was a bed wetter until age 10. Pt currently has been having incrd dyspaurenia and mixed incontinence and fecal stooling when wiping for urine since birth of 2 year old. She also relates this was slightly starting after her 7 year old was born. Pt works full time as caregiver at [employer] and goes to school for [employer] part time. She has 2 children and a supportive significant other.   Impairments P. Bowel or bladder problems   Functional Limitations perform desired leisure / sports activities   Symptom Location pelvic floor   Pain rating (0-10 point scale) Best (/10);Worst (/10)   Best (/10) 0   Worst (/10) 4   Current Level of Function   Current Community Support Family/friend caregiver   Patient role/employment history A. Employed;B. Student   Employment Comments works for [employer] (short shifts) up to 40 hrs per week   Living environment (8 hours school)   Current equipment-Gait/Locomotion None   Fall Risk Screen   Have you fallen 2 or more times in the past year? No   Have you fallen and had an injury in the past year? No   Is patient a fall risk? No   System Outcome Measures   Outcome Measures (AUA symptom score - 19/35 points)   Specific " "Questions   Specific Questions Pelvic Floor Dysfunction;Women's Health   Pelvic Floor Dysfunction Questions   Regular exercise No   Fluid intake-glasses/day (one glass/cup = 8oz 50   Caffeinated beverages-glasses/day 16   Alcoholic beverages - glasses/day 0   Recent diet change? No   How long can you delay the need to urinate?  5-60 mns   How many times do you wake to urinate at night?   1   How often do you urinate during the day?   6   Can you stop the flow of urine when on the toilet?  No   Is the volume of urine passed usually  Medium   Do you have the sensation that you need to go to the toilet?  Yes   Do you empty your bladder frequently, before you experience the urge to pass urine?  (not sure- maybe)   Do you have \"triggers\" that make you feel you can't wait to go to the toilet?  Yes   Number of bladder infections last year?  3   (went in 20 times in past 2 years for UTI and was negative)   Frequency of bowel movements:  1-2 x daily   Consistency of stool?  Soft   Do you ignore the urge to defecate?  No   Women's Health Questions   Number of pregnancies  2  (vaginal tear on first birth, 6 y/o and 1 y/o)   Number of vaginal deliveries  2   Number of  section deliveries  0   Weight of largest baby  7 lbs 14 ounces   Number of episiotomies  2  (difficulty delivering - MD manually had to remove placenta -)   Pelvic Floor Dysfunction Objective Findings   Pain-pelvic dysfunction (during and after sex)   Areas of Tightness Hamstrings;Piriformis;Gluteals   Abdominal Wall Scar mobility;Trigger points   Trigger points (R sided PFM tightness and pain wtih palpation)   Type of Storage Problem nocturia;stress incontinence;urge incontinence;urgency   Type of Emptying Problem fecal incontinence;postvoid dribbling   Protection needed None   Power (MMT at Levator Ani) 0   Elevation (Able to lift posterior vaginal wall toward head and public bone) Absent  (no anal wink)   Medications None   Pelvic pt has signif " episiotomy scarring and is quite pain ful to touch/pressure. ALso has painful PFM layers 1 L>R.   None seasonale birth control   Scar mobility rigid- episiotomy   Trigger point comments episiotomy is very immobile/ropey and tender. Transverse transverse perineal mm extremely tight/tender   Planned Therapy Interventions   Planned Therapy Interventions manual therapy;motor coordination training;neuromuscular re-education;strengthening;stretching   Planned Therapy Interventions Comment HEP, education   Planned Modality Interventions   Planned Modality Interventions Biofeedback  (prn)   Clinical Impression   Criteria for Skilled Therapeutic Interventions Met yes, treatment indicated   PT Diagnosis PFM dysfunction resulting in mixed UI and bowel incontinence as well as dyspaurenia   Influenced by the following impairments pain, tightness, weakness, poor scar mobility   Clinical Presentation Evolving/Changing   Clinical Decision Making (Complexity) Moderate complexity   Therapy Frequency 2 times/Week  (1-2x/week)   Predicted Duration of Therapy Intervention (days/wks) 12 weeks   Risk & Benefits of therapy have been explained Yes   Patient, Family & other staff in agreement with plan of care Yes   Clinical Impression Comments Pt will benefit from PFM based PT with further reassessment orthopaedically to decrease UI, bowel incontinence, PFM tightness and pain and improve PFM function and strength   Education Assessment   Barriers to Learning No barriers   ORTHO GOALS   PT Ortho Eval Goals 1;2;3   Ortho Goal 1   Goal Identifier STG1   Goal Description Pt will demonstrate indep HEP compliance   Target Date 02/15/18   Ortho Goal 2   Goal Identifier STG 2   Goal Description Pt will decrease pelvic pain to 0-1/10 during and following sex.    Target Date 03/15/18   Ortho Goal 3   Goal Identifier LTG   Goal Description ADLs will not be limited by urgency, frequency, incontinence of pelvic pain   Target Date 04/12/18   Total  Evaluation Time   Total Evaluation Time 35

## 2018-02-02 ENCOUNTER — HOSPITAL ENCOUNTER (OUTPATIENT)
Dept: PHYSICAL THERAPY | Facility: HOSPITAL | Age: 28
Setting detail: THERAPIES SERIES
End: 2018-02-02
Attending: NURSE PRACTITIONER
Payer: COMMERCIAL

## 2018-02-02 PROCEDURE — 97140 MANUAL THERAPY 1/> REGIONS: CPT | Mod: GP

## 2018-02-02 PROCEDURE — 97110 THERAPEUTIC EXERCISES: CPT | Mod: GP

## 2018-02-02 PROCEDURE — 40000718 ZZHC STATISTIC PT DEPARTMENT ORTHO VISIT

## 2018-02-07 ENCOUNTER — HOSPITAL ENCOUNTER (OUTPATIENT)
Dept: PHYSICAL THERAPY | Facility: HOSPITAL | Age: 28
Setting detail: THERAPIES SERIES
End: 2018-02-07
Attending: NURSE PRACTITIONER
Payer: COMMERCIAL

## 2018-02-07 PROCEDURE — 97140 MANUAL THERAPY 1/> REGIONS: CPT | Mod: GP

## 2018-02-07 PROCEDURE — 40000718 ZZHC STATISTIC PT DEPARTMENT ORTHO VISIT

## 2018-02-07 PROCEDURE — 97110 THERAPEUTIC EXERCISES: CPT | Mod: GP

## 2018-02-14 ENCOUNTER — HOSPITAL ENCOUNTER (OUTPATIENT)
Dept: PHYSICAL THERAPY | Facility: HOSPITAL | Age: 28
Setting detail: THERAPIES SERIES
End: 2018-02-14
Attending: NURSE PRACTITIONER
Payer: COMMERCIAL

## 2018-02-14 PROCEDURE — 97110 THERAPEUTIC EXERCISES: CPT | Mod: GP

## 2018-02-14 PROCEDURE — 97140 MANUAL THERAPY 1/> REGIONS: CPT | Mod: GP

## 2018-02-14 PROCEDURE — 40000718 ZZHC STATISTIC PT DEPARTMENT ORTHO VISIT

## 2018-02-21 ENCOUNTER — HOSPITAL ENCOUNTER (OUTPATIENT)
Dept: PHYSICAL THERAPY | Facility: HOSPITAL | Age: 28
Setting detail: THERAPIES SERIES
End: 2018-02-21
Attending: NURSE PRACTITIONER
Payer: COMMERCIAL

## 2018-02-21 PROCEDURE — 97110 THERAPEUTIC EXERCISES: CPT | Mod: GP

## 2018-02-21 PROCEDURE — 97140 MANUAL THERAPY 1/> REGIONS: CPT | Mod: GP

## 2018-02-21 PROCEDURE — 40000718 ZZHC STATISTIC PT DEPARTMENT ORTHO VISIT

## 2018-02-27 ENCOUNTER — HOSPITAL ENCOUNTER (OUTPATIENT)
Dept: PHYSICAL THERAPY | Facility: HOSPITAL | Age: 28
Setting detail: THERAPIES SERIES
End: 2018-02-27
Attending: NURSE PRACTITIONER
Payer: COMMERCIAL

## 2018-02-27 PROCEDURE — 40000718 ZZHC STATISTIC PT DEPARTMENT ORTHO VISIT

## 2018-02-27 PROCEDURE — 97140 MANUAL THERAPY 1/> REGIONS: CPT | Mod: GP

## 2018-02-28 ENCOUNTER — HOSPITAL ENCOUNTER (OUTPATIENT)
Dept: PHYSICAL THERAPY | Facility: HOSPITAL | Age: 28
Setting detail: THERAPIES SERIES
End: 2018-02-28
Attending: NURSE PRACTITIONER
Payer: COMMERCIAL

## 2018-02-28 PROCEDURE — 97140 MANUAL THERAPY 1/> REGIONS: CPT | Mod: GP

## 2018-02-28 PROCEDURE — 97110 THERAPEUTIC EXERCISES: CPT | Mod: GP

## 2018-02-28 PROCEDURE — 40000718 ZZHC STATISTIC PT DEPARTMENT ORTHO VISIT

## 2018-03-07 ENCOUNTER — HOSPITAL ENCOUNTER (OUTPATIENT)
Dept: PHYSICAL THERAPY | Facility: HOSPITAL | Age: 28
Setting detail: THERAPIES SERIES
End: 2018-03-07
Attending: NURSE PRACTITIONER
Payer: COMMERCIAL

## 2018-03-07 PROCEDURE — 97140 MANUAL THERAPY 1/> REGIONS: CPT | Mod: GP

## 2018-03-07 PROCEDURE — 40000718 ZZHC STATISTIC PT DEPARTMENT ORTHO VISIT

## 2018-03-07 PROCEDURE — 97530 THERAPEUTIC ACTIVITIES: CPT | Mod: GP

## 2018-03-07 PROCEDURE — 97110 THERAPEUTIC EXERCISES: CPT | Mod: GP

## 2018-04-18 ENCOUNTER — OFFICE VISIT (OUTPATIENT)
Dept: FAMILY MEDICINE | Facility: OTHER | Age: 28
End: 2018-04-18
Attending: FAMILY MEDICINE
Payer: COMMERCIAL

## 2018-04-18 VITALS
HEART RATE: 77 BPM | SYSTOLIC BLOOD PRESSURE: 106 MMHG | HEIGHT: 65 IN | TEMPERATURE: 97.9 F | WEIGHT: 137 LBS | BODY MASS INDEX: 22.82 KG/M2 | OXYGEN SATURATION: 98 % | DIASTOLIC BLOOD PRESSURE: 64 MMHG

## 2018-04-18 DIAGNOSIS — Z71.6 TOBACCO ABUSE COUNSELING: ICD-10-CM

## 2018-04-18 DIAGNOSIS — L98.9 SKIN LESION: Primary | ICD-10-CM

## 2018-04-18 DIAGNOSIS — Z72.0 TOBACCO ABUSE: ICD-10-CM

## 2018-04-18 PROCEDURE — G0463 HOSPITAL OUTPT CLINIC VISIT: HCPCS

## 2018-04-18 PROCEDURE — 99213 OFFICE O/P EST LOW 20 MIN: CPT | Performed by: FAMILY MEDICINE

## 2018-04-18 ASSESSMENT — ANXIETY QUESTIONNAIRES
5. BEING SO RESTLESS THAT IT IS HARD TO SIT STILL: NOT AT ALL
GAD7 TOTAL SCORE: 0
7. FEELING AFRAID AS IF SOMETHING AWFUL MIGHT HAPPEN: NOT AT ALL
6. BECOMING EASILY ANNOYED OR IRRITABLE: NOT AT ALL
3. WORRYING TOO MUCH ABOUT DIFFERENT THINGS: NOT AT ALL
2. NOT BEING ABLE TO STOP OR CONTROL WORRYING: NOT AT ALL
1. FEELING NERVOUS, ANXIOUS, OR ON EDGE: NOT AT ALL

## 2018-04-18 ASSESSMENT — PAIN SCALES - GENERAL: PAINLEVEL: NO PAIN (0)

## 2018-04-18 ASSESSMENT — PATIENT HEALTH QUESTIONNAIRE - PHQ9: 5. POOR APPETITE OR OVEREATING: NOT AT ALL

## 2018-04-18 NOTE — PATIENT INSTRUCTIONS

## 2018-04-18 NOTE — PROGRESS NOTES
Latasha Fisher    April 18, 2018    Chief Complaint   Patient presents with     Mole     Back of neck       SUBJECTIVE:  Here for moles on the back of the neck.  Bothersome.  One got caught and pulled off.  The others rub and she worries about them and would like them removed.  We discussed.     Past Medical History:   Diagnosis Date     Acute pancreatitis child    post treuma, from a fall     Hepatitis remote    transient, probably from alcohol     Scoliosis      Tobacco abuse        Past Surgical History:   Procedure Laterality Date     KNEE SURGERY       LAPAROSCOPIC APPENDECTOMY  4/23/2013    Procedure: LAPAROSCOPIC APPENDECTOMY;  LAPAROSCOPIC APPENDECTOMY;  Surgeon: Paula Roberts MD;  Location: [facility]       Current Outpatient Prescriptions   Medication Sig Dispense Refill     albuterol (VENTOLIN HFA) 108 (90 BASE) MCG/ACT inhaler Inhale 2 puffs into the lungs every 4 hours as needed for shortness of breath / dyspnea or wheezing INHALE TWO PUFFS INTO LUNGS EVERY 4 HOURS AS NEEDED FOR SHORTNESS OF BREATH, SHAKE BEFORE USING 1 Inhaler 0     IBUPROFEN PO Take 600 mg by mouth       levonorgestrel-ethinyl estradiol (SEASONALE) 0.15-0.03 MG per tablet Take 1 tablet by mouth daily 91 tablet 3       No Known Allergies    Family History   Problem Relation Age of Onset     Thyroid Disease Mother      DIABETES Father      Asthma Sister      Attention Deficit Disorder Brother        Social History     Social History     Marital status: Single     Spouse name: N/A     Number of children: 1     Years of education: 14     Occupational History      [employer]     Social History Main Topics     Smoking status: Current Every Day Smoker     Packs/day: 0.50     Years: 4.00     Smokeless tobacco: Never Used      Comment: Trying to quit     Alcohol use No      Comment: occasionally when she goes out' past hx heavier consumption     Drug use: No     Sexual activity: Not Currently     Partners: Male     Birth  control/ protection: Injection     Other Topics Concern      Service No     Blood Transfusions Yes     Caffeine Concern Yes     1/day -- occ     Exercise No     Seat Belt Yes     Social History Narrative       5 point ROS negative except as noted above in HPI, including Gen., Resp., CV, GI &  system review.     OBJECTIVE:  B/P: 106/64, Temperature: 97.9, Pulse: 77, Respirations: Data Unavailable    GENERAL APPEARANCE: Alert, no acute distress  3 moles on the back of the neck.  Probably pigmented flatter tags x 2 and the other I'm not sure.  It is scaled.  All 3 are likely benign.   SKIN: no suspicious lesions or rashes to visualized skin  NEURO: Alert, oriented x 3, speech and mentation normal    ASSESSMENT and PLAN:  (L98.9) Skin lesion  (primary encounter diagnosis)  Comment: discussed.   Plan: GENERAL SURG ADULT REFERRAL         I told her we could watch these, but they are bothersome, get caught on her collars, and would like them removed.  2 of them likely could be shaved, but the other I'm not sure and I would rather surgery see this.  Likely office procedure I think.      (Z72.0) Tobacco abuse  Comment: recommend cessation.  Plan: Tobacco Cessation - Order to Satisfy Health         Maintenance         As above.     (Z71.6) Tobacco abuse counseling  Comment: as above.    Plan: as above.

## 2018-04-18 NOTE — NURSING NOTE
"Chief Complaint   Patient presents with     Mole     Back of neck       Initial /64  Pulse 77  Temp 97.9  F (36.6  C)  Ht 5' 5\" (1.651 m)  Wt 137 lb (62.1 kg)  SpO2 98%  BMI 22.8 kg/m2 Estimated body mass index is 22.8 kg/(m^2) as calculated from the following:    Height as of this encounter: 5' 5\" (1.651 m).    Weight as of this encounter: 137 lb (62.1 kg).  Medication Reconciliation: complete   Padmini Mejía    "

## 2018-04-18 NOTE — MR AVS SNAPSHOT
After Visit Summary   4/18/2018    Latasha Fisher    MRN: [id number]           Patient Information     Date Of Birth          1990        Visit Information        Provider Department      4/18/2018 9:45 AM Maximus Kemp MD [facility]        Today's Diagnoses     Skin lesion    -  1    Tobacco abuse        Tobacco abuse counseling          Care Instructions      HOW TO QUIT SMOKING  Smoking is one of the hardest habits to break. About half of all those who have ever smoked have been able to quit, and most of those (about 70%) who still smoke want to quit. Here are some of the best ways to stop smoking.     KEEP TRYING:  It takes most smokers about 8 tries before they are finally able to fully quit. So, the more often you try and fail, the better your chance of quitting the next time! So, don't give up!    GO COLD TURKEY:  Most ex-smokers quit cold turkey. Trying to cut back gradually doesn't seem to work as well, perhaps because it continues the smoking habit. Also, it is possible to fool yourself by inhaling more while smoking fewer cigarettes. This results in the same amount of nicotine in your body!    GET SUPPORT:  Support programs can make an important difference, especially for the heavy smoker. These groups offer lectures, methods to change your behavior and peer support. Call the free national Quitline for more information. 800-QUIT-NOW (341-055-7381). Low-cost or free programs are offered by many hospitals, local chapters of the American Lung Association (086-558-2070) and the American Cancer Society (453-389-6034). Support at home is important too. Non-smokers can help by offering praise and encouragement. If the smoker fails to quit, encourage them to try again!    OVER-THE-COUNTER MEDICINES:  For those who can't quit on their own, Nicotine Replacement Therapy (NRT) may make quitting much easier. Certain aids such as the nicotine patch, gum and lozenge are available  without a prescription. However, it is best to use these under the guidance of your doctor. The skin patch provides a steady supply of nicotine to the body. Nicotine gum and lozenge gives temporary bursts of low levels of nicotine. Both methods take the edge off the craving for cigarettes. WARNING: If you feel symptoms of nicotine overdose, such as nausea, vomiting, dizziness, weakness, or fast heartbeat, stop using these and see your doctor.    PRESCRIPTION MEDICINES:  After evaluating your smoking patterns and prior attempts at quitting, your doctor may offer a prescription medicine such as bupropion (Zyban, Wellbutrin), varenicline (Chantix, Champix), a niocotine inhaler or nasal spray. Each has its unique advantage and side effects which your doctor can review with you.    HEALTH BENEFITS OF QUITTING:  The benefits of quitting start right away and keep improving the longer you go without smokin minutes: blood pressure and pulse return to normal  8 hours: oxygen levels return to normal  2 days: ability to smell and taste begins to improve as damaged nerves start to regrow  2-3 weeks: circulation and lung function improves  1-9 months: decreased cough, congestion and shortness of breath; less tired  1 year: risk of heart attack decreases by half  5 years: risk of lung cancer decreases by half; risk of stroke becomes the same as a non-smoker  For information about how to quit smoking, visit the following links:  National Cancer [place] ,   Clearing the Air, Quit Smoking Today   - an online booklet. http://www.smokefree.gov/pubs/clearing_the_air.pdf  Smokefree.gov http://smokefree.gov/  QuitNet http://www.quitnet.com/    8009-3437 Genia Sethi, [address] [place] [address], [place] [address]. All rights reserved. This information is not intended as a substitute for professional medical care. Always follow your healthcare professional's instructions.    The Benefits of Living Smoke Free  What do you want to gain  from quitting? Check off some reasons to quit.  Health Benefits  ___ Reduce my risk of lung cancer, heart disease, chronic lung disease  ___ Have fewer wrinkles and softer skin  ___ Improve my sense of taste and smell  ___ For pregnant women--reduce the risk of having a miscarriage, stillbirth, premature birth, or low-birth-weight baby  Personal Benefits  ___ Feel more in control of my life  ___ Have better-smelling hair, breath, clothes, home, and car  ___ Save time by not having to take smoke breaks, buy cigarettes, or hunt for a light  ___ Have whiter teeth  Family Benefits  ___ Reduce my children s respiratory tract infections  ___ Set a good example for my children  ___ Reduce my family s cancer risk  Financial Benefits  ___ Save hundreds of dollars each year that would be spent on cigarettes  ___ Save money on medical bills  ___ Save on life, health, and car insurance premiums    Those Dollars Add Up!  Cigarettes are expensive, and getting more expensive all the time. Do you realize how much money you are spending on cigarettes per year? What is the average amount you spend on a pack of cigarettes? What is the average number of packs that you smoke per day? Using your answers to these questions, fill in this formula to help you find out:  ($ _____ per pack) ×  ( _____ number of packs per day) × (365 days) =  $ _____ yearly cost of smoking  Besides tobacco, there are other costs, including extra cleaning bills and replacement costs for clothing and furniture; medical expenses for smoking-related illnesses; and higher health, life, and car insurance premiums.    Cigars and Pipes Count Too!  Cigars and pipes are also dangerous. So are smokeless (chewing) tobacco and snuff. All of these products contain nicotine, a highly addictive substance that has harmful effects on your body. Quitting smoking means giving up all tobacco products.      1656-1214 Genia Sethi, [address] [place] [address], [place] [address]. All  rights reserved. This information is not intended as a substitute for professional medical care. Always follow your healthcare professional's instructions.          Follow-ups after your visit        Additional Services     GENERAL SURG ADULT REFERRAL       Your provider has referred you to: general surgery    Please be aware that coverage of these services is subject to the terms and limitations of your health insurance plan.  Call member services at your health plan with any benefit or coverage questions.      Please bring the following with you to your appointment:    (1) Any X-Rays, CTs or MRIs which have been performed.  Contact the facility where they were done to arrange for  prior to your scheduled appointment.   (2) List of current medications   (3) This referral request   (4) Any documents/labs given to you for this referral                  Your next 10 appointments already scheduled     May 08, 2018  9:00 AM CDT   (Arrive by 8:45 AM)   New Visit with River Clements MD   [facility] ([facility] )    [address] [place] [address]   [phone]              Who to contact     If you have questions or need follow up information about today's clinic visit or your schedule please contact [facility] ([facility]) directly at [phone].  Normal or non-critical lab and imaging results will be communicated to you by MyChart, letter or phone within 4 business days after the clinic has received the results. If you do not hear from us within 7 days, please contact the clinic through MyChart or phone. If you have a critical or abnormal lab result, we will notify you by phone as soon as possible.  Submit refill requests through [employer] or call your pharmacy and they will forward the refill request to us. Please allow 3 business days for your refill to be completed.          Additional Information About Your Visit        Care EveryWhere ID     This is your Care  "EveryWhere ID. This could be used by other organizations to access your [facility] medical records  [id number]        Your Vitals Were     Pulse Temperature Height Pulse Oximetry BMI (Body Mass Index)       77 97.9  F (36.6  C) 5' 5\" (1.651 m) 98% 22.8 kg/m2        Blood Pressure from Last 3 Encounters:   04/18/18 106/64   01/02/18 100/62   12/27/17 126/77    Weight from Last 3 Encounters:   04/18/18 137 lb (62.1 kg)   01/02/18 136 lb (61.7 kg)   11/17/17 140 lb (63.5 kg)              We Performed the Following     GENERAL SURG ADULT REFERRAL     Tobacco Cessation - Order to Satisfy Health Maintenance          Today's Medication Changes          These changes are accurate as of 4/18/18 12:48 PM.  If you have any questions, ask your nurse or doctor.               Start taking these medicines.        Dose/Directions    nicotine polacrilex 4 MG lozenge   Commonly known as:  [facility] NICOTINE POLACRILEX   Used for:  Tobacco abuse, Tobacco abuse counseling   Started by:  Maximus Kemp MD        Dose:  4 mg   Place 1 lozenge (4 mg) inside cheek as needed for smoking cessation   Quantity:  360 tablet   Refills:  0            Where to get your medicines      These medications were sent to [facility] [address] [facility], [place] [address] [address] [address]  [address] [facility] [address], [facility] [address]     Phone:  [phone]     nicotine polacrilex 4 MG lozenge                Primary Care Provider Office Phone # Fax #    Maximus Kemp [phone]       [address] [place] [address]        Equal Access to Services     [facility]: Hadii aad ku hadasho Soomaali, waaxda luqadaha, qaybta kaalmada adeegyada, annie idiin hayboubacarn chelsea elziabeth . So [facility] [phone].    ATENCIÓN: Si habla español, tiene a rodriguez disposición servicios gratuitos de asistencia lingüística. Llame al [phone].    We comply with applicable federal civil rights laws and [place] laws. We do not discriminate on the basis of race, color, national " origin, age, disability, sex, sexual orientation, or gender identity.            Thank you!     Thank you for choosing [facility]  for your care. Our goal is always to provide you with excellent care. Hearing back from our patients is one way we can continue to improve our services. Please take a few minutes to complete the written survey that you may receive in the mail after your visit with us. Thank you!             Your Updated Medication List - Protect others around you: Learn how to safely use, store and throw away your medicines at www.disposemymeds.org.          This list is accurate as of 4/18/18 12:48 PM.  Always use your most recent med list.                   Brand Name Dispense Instructions for use Diagnosis    albuterol 108 (90 Base) MCG/ACT Inhaler    VENTOLIN HFA    1 Inhaler    Inhale 2 puffs into the lungs every 4 hours as needed for shortness of breath / dyspnea or wheezing INHALE TWO PUFFS INTO LUNGS EVERY 4 HOURS AS NEEDED FOR SHORTNESS OF BREATH, SHAKE BEFORE USING    Unspecified asthma(493.90)       IBUPROFEN PO      Take 600 mg by mouth        levonorgestrel-ethinyl estradiol 0.15-0.03 MG per tablet    SEASONALE    91 tablet    Take 1 tablet by mouth daily    Encounter for initial prescription of contraceptive pills       nicotine polacrilex 4 MG lozenge    CVS NICOTINE POLACRILEX    360 tablet    Place 1 lozenge (4 mg) inside cheek as needed for smoking cessation    Tobacco abuse, Tobacco abuse counseling

## 2018-04-19 ASSESSMENT — PATIENT HEALTH QUESTIONNAIRE - PHQ9: SUM OF ALL RESPONSES TO PHQ QUESTIONS 1-9: 0

## 2018-04-19 ASSESSMENT — ANXIETY QUESTIONNAIRES: GAD7 TOTAL SCORE: 0

## 2018-05-08 ENCOUNTER — OFFICE VISIT (OUTPATIENT)
Dept: SURGERY | Facility: OTHER | Age: 28
End: 2018-05-08
Attending: FAMILY MEDICINE
Payer: COMMERCIAL

## 2018-05-08 VITALS
OXYGEN SATURATION: 98 % | HEIGHT: 65 IN | SYSTOLIC BLOOD PRESSURE: 88 MMHG | DIASTOLIC BLOOD PRESSURE: 64 MMHG | HEART RATE: 82 BPM | TEMPERATURE: 97.8 F | WEIGHT: 135 LBS | BODY MASS INDEX: 22.49 KG/M2

## 2018-05-08 DIAGNOSIS — L98.9 SKIN LESION: ICD-10-CM

## 2018-05-08 PROCEDURE — 99203 OFFICE O/P NEW LOW 30 MIN: CPT | Performed by: SURGERY

## 2018-05-08 PROCEDURE — G0463 HOSPITAL OUTPT CLINIC VISIT: HCPCS

## 2018-05-08 ASSESSMENT — PAIN SCALES - GENERAL: PAINLEVEL: NO PAIN (0)

## 2018-05-08 NOTE — MR AVS SNAPSHOT
"              After Visit Summary   5/8/2018    Latasha Fisher    MRN: [id number]           Patient Information     Date Of Birth          1990        Visit Information        Provider Department      5/8/2018 9:00 AM [facility] Clements MD [facility]        Today's Diagnoses     Skin lesion          Care Instructions    Thank you for allowing Dr. Clements and our surgical team to participate in your care.  If you have a scheduling or an appointment question please contact Mari, our Health Unit Coordinator at her direct line [phone].   ALL nursing questions or concerns can be directed to your surgical nurse at: [phone].              Follow-ups after your visit        Who to contact     If you have questions or need follow up information about today's clinic visit or your schedule please contact [facility] directly at [phone].  Normal or non-critical lab and imaging results will be communicated to you by MyChart, letter or phone within 4 business days after the clinic has received the results. If you do not hear from us within 7 days, please contact the clinic through MyChart or phone. If you have a critical or abnormal lab result, we will notify you by phone as soon as possible.  Submit refill requests through [employer] or call your pharmacy and they will forward the refill request to us. Please allow 3 business days for your refill to be completed.          Additional Information About Your Visit        Care EveryWhere ID     This is your Care EveryWhere ID. This could be used by other organizations to access your [place] medical records  [id number]        Your Vitals Were     Pulse Temperature Height Pulse Oximetry BMI (Body Mass Index)       82 97.8  F (36.6  C) (Tympanic) 5' 5\" (1.651 m) 98% 22.47 kg/m2        Blood Pressure from Last 3 Encounters:   05/08/18 (!) 88/64   04/18/18 106/64   01/02/18 100/62    Weight from Last 3 Encounters:   05/08/18 135 lb " (61.2 kg)   04/18/18 137 lb (62.1 kg)   01/02/18 136 lb (61.7 kg)              Today, you had the following     No orders found for display       Primary Care Provider Office Phone # Fax #    Maximus Kemp [phone]       [address] [place] [address]        Equal Access to Services     [facility]: Hadii aad ku hadasho Soomaali, waaxda luqadaha, qaybta kaalmada adeegyada, waxay idiin hayaan adeeg kharash laPauletteaan . So [facility] [phone].    ATENCIÓN: Si habla español, tiene a rodriguez disposición servicios gratuitos de asistencia lingüística. Llame al [phone].    We comply with applicable federal civil rights laws and [place] laws. We do not discriminate on the basis of race, color, national origin, age, disability, sex, sexual orientation, or gender identity.            Thank you!     Thank you for choosing [facility]  [facility] your [facility]. Our goal is always to provide you with excellent care. Hearing back from our patients is one way we can continue to improve our services. Please take a few minutes to complete the written survey that you may receive in the mail after your visit with us. Thank you!             Your Updated Medication List - Protect others around you: Learn how to safely use, store and throw away your medicines at www.disposemymeds.org.          This list is accurate as of 5/8/18  9:18 AM.  Always use your most recent med list.                   Brand Name Dispense Instructions for use Diagnosis    albuterol 108 (90 Base) MCG/ACT Inhaler    VENTOLIN HFA    1 Inhaler    Inhale 2 puffs into the lungs every 4 hours as needed for shortness of breath / dyspnea or wheezing INHALE TWO PUFFS INTO LUNGS EVERY 4 HOURS AS NEEDED FOR SHORTNESS OF BREATH, SHAKE BEFORE USING    Unspecified asthma(493.90)       IBUPROFEN PO      Take 600 mg by mouth        levonorgestrel-ethinyl estradiol 0.15-0.03 MG per tablet    SEASONALE    91 tablet    Take 1 tablet by mouth daily     Encounter for initial prescription of contraceptive pills       nicotine polacrilex 4 MG lozenge    CVS NICOTINE POLACRILEX    360 tablet    Place 1 lozenge (4 mg) inside cheek as needed for smoking cessation    Tobacco abuse, Tobacco abuse counseling

## 2018-05-08 NOTE — PATIENT INSTRUCTIONS
Thank you for allowing Dr. Clements and our surgical team to participate in your care.  If you have a scheduling or an appointment question please contact Mari, our Health Unit Coordinator at her direct line [phone].   ALL nursing questions or concerns can be directed to your surgical nurse at: [phone].

## 2018-05-08 NOTE — PROGRESS NOTES
[facility] Surgery Consultation    CC:  Skin lesion    HPI:  This 27 year old year old female is seen at the request of Maximus Kemp for evaluation of neck skin lesion.  The history is obtained from the patient, and reviewing the medical record.  She is good medical historian. She states that she has had these skin lesions on her posterior neck for some time. She has had them shaved off in the past. No pathology was available from previous shaves. She says that at times the lesions will get caught on her clothes and cause some irritation. She says that she would pick at them and then she had some bleeding. She was referred for possible excision.    Past Medical History:   Diagnosis Date     Acute pancreatitis child    post treuma, from a fall     Hepatitis remote    transient, probably from alcohol     Scoliosis      Tobacco abuse        Past Surgical History:   Procedure Laterality Date     KNEE SURGERY       LAPAROSCOPIC APPENDECTOMY  4/23/2013    Procedure: LAPAROSCOPIC APPENDECTOMY;  LAPAROSCOPIC APPENDECTOMY;  Surgeon: Paula Roberts MD;  Location: [facility] OR       Pt denied problems with bleeding or anesthesia    Prior to Admission medications    Medication Sig Start Date End Date Taking? Authorizing Provider   albuterol (VENTOLIN HFA) 108 (90 BASE) MCG/ACT inhaler Inhale 2 puffs into the lungs every 4 hours as needed for shortness of breath / dyspnea or wheezing INHALE TWO PUFFS INTO LUNGS EVERY 4 HOURS AS NEEDED FOR SHORTNESS OF BREATH, SHAKE BEFORE USING 9/29/15  Yes Latosha Kathleen, NP   IBUPROFEN PO Take 600 mg by mouth   Yes Reported, Patient   levonorgestrel-ethinyl estradiol (SEASONALE) 0.15-0.03 MG per tablet Take 1 tablet by mouth daily 9/27/17  Yes Maximus Kemp MD   nicotine polacrilex (CVS NICOTINE POLACRILEX) 4 MG lozenge Place 1 lozenge (4 mg) inside cheek as needed for smoking cessation 4/18/18  Yes Maximus Kemp MD        No Known Allergies      HABITS:    Social History   Substance  "Use Topics     Smoking status: Current Every Day Smoker     Packs/day: 0.50     Years: 4.00     Smokeless tobacco: Never Used      Comment: Trying to quit     Alcohol use No      Comment: occasionally when she goes out' past hx heavier consumption     No mood altering drug use.    Family History   Problem Relation Age of Onset     Thyroid Disease Mother      DIABETES Father      Asthma Sister      Attention Deficit Disorder Brother        REVIEW OF SYSTEMS:  Ten point review of systems negative except those mentioned in the HPI.     The patient denies sleep apnea, latex allergies or MRSA    OBJECTIVE:    BP (!) 88/64 (BP Location: Right arm, Patient Position: Sitting, Cuff Size: Adult Regular)  Pulse 82  Temp 97.8  F (36.6  C) (Tympanic)  Ht 5' 5\" (1.651 m)  Wt 135 lb (61.2 kg)  SpO2 98%  BMI 22.47 kg/m2    GENERAL: Generally appears well, in no distress with appropriate affect.  Skin:  Two small brown skin tags/moles. They are 3 mm in size, round, with no asymmetry, there is no central ulceration or surrounding erythema. They are non-tender to the touch. The previous site where she picked one off is well healed with no surrounding erythema or signs of infection. She also has two small skin tags with no surrounding erythema   Neurological: grossly intact    Psych:  Alert, oriented, affect appropriate with normal decision making ability.      IMPRESSION:  27 year old with skin tags and moles    PLAN:  I discussed with the patient that as her moles/skin tags are normal in appearance with regard to size, symmetry, color I would just monitor for now. I described the approach I would use being an excision and the patient would like to wait for now. She said that she will monitor for any changes and see if they change. If there are changes I informed the patient that she call our office. All questions and concerns were addressed.    Thank you for allowing me to participate in the care of your patient. "           River Clements MD    5/8/2018  9:45 AM    cc:  Maximus Kemp

## 2018-07-19 NOTE — NURSING NOTE
"Chief Complaint   Patient presents with     Derm Problem     moles/back of neck- scraped one off.  Dr Kemp removed; grew back       Initial BP (!) 88/64 (BP Location: Right arm, Patient Position: Sitting, Cuff Size: Adult Regular)  Pulse 82  Temp 97.8  F (36.6  C) (Tympanic)  Ht 5' 5\" (1.651 m)  Wt 135 lb (61.2 kg)  SpO2 98%  BMI 22.47 kg/m2 Estimated body mass index is 22.47 kg/(m^2) as calculated from the following:    Height as of this encounter: 5' 5\" (1.651 m).    Weight as of this encounter: 135 lb (61.2 kg).  Medication Reconciliation: complete    Antonietta Will LPN  "
73 yo male A&OX3 presents to the ED with the c/o bloody stools. Pt states that he was recently on Augmentin for an abscess to the back of his leg x 1 week. Pt denies abd pain, states that he had a blood in his stool since last night. Today he report 6 BM with blood. Denies n/v/ no fevers or chills. Pt c/o fatigue and weakness. Abd round, soft, non tender and non distended. Decrease po intake. Denies PMH of rectal bleed. Family at bedside, MAEX4, VSS.

## 2018-08-10 ENCOUNTER — ALLIED HEALTH/NURSE VISIT (OUTPATIENT)
Dept: ALLERGY | Facility: OTHER | Age: 28
End: 2018-08-10
Attending: OTOLARYNGOLOGY
Payer: COMMERCIAL

## 2018-08-10 DIAGNOSIS — Z23 NEED FOR TDAP VACCINATION: Primary | ICD-10-CM

## 2018-08-10 DIAGNOSIS — Z11.1 SCREENING EXAMINATION FOR PULMONARY TUBERCULOSIS: ICD-10-CM

## 2018-08-10 PROCEDURE — 90715 TDAP VACCINE 7 YRS/> IM: CPT

## 2018-08-10 PROCEDURE — 86580 TB INTRADERMAL TEST: CPT

## 2018-08-10 PROCEDURE — 90471 IMMUNIZATION ADMIN: CPT

## 2018-08-10 NOTE — PROGRESS NOTES

## 2018-08-10 NOTE — MR AVS SNAPSHOT
"              After Visit Summary   8/10/2018    Latasha Fisher    MRN: [id number]           Patient Information     Date Of Birth          1990        Visit Information        Provider Department      8/10/2018 2:00 PM HC SHOT ROOM [place] [facility]        Today's Diagnoses     Need for Tdap vaccination    -  1    Screening examination for pulmonary tuberculosis           Follow-ups after your visit        Who to contact     If you have questions or need follow up information about today's clinic visit or your schedule please contact [facility] directly at [phone].  Normal or non-critical lab and imaging results will be communicated to you by [employer], letter or phone within 4 business days after the clinic has received the results. If you do not hear from us within 7 days, please contact the clinic through [employer] or phone. If you have a critical or abnormal lab result, we will notify you by phone as soon as possible.  Submit refill requests through [employer] or call your pharmacy and they will forward the refill request to us. Please allow 3 business days for your refill to be completed.          Additional Information About Your Visit        MyChart Information     [employer] lets you send messages to your doctor, view your test results, renew your prescriptions, schedule appointments and more. To sign up, go to [email or web address] . Click on \"Log in\" on the left side of the screen, which will take you to the Welcome page. Then click on \"Sign up Now\" on the right side of the page.     You will be asked to enter the access code listed below, as well as some personal information. Please follow the directions to create your username and password.     Your access code is: [email or web address]  Expires: 2018  2:20 PM     Your access code will  in 90 days. If you need help or a new code, please call your [facility] or [phone].        Care EveryWhere ID     This is your Care " EveryWhere ID. This could be used by other organizations to access your [place] medical records  [id number]         Blood Pressure from Last 3 Encounters:   05/08/18 (!) 88/64   04/18/18 106/64   01/02/18 100/62    Weight from Last 3 Encounters:   05/08/18 135 lb (61.2 kg)   04/18/18 137 lb (62.1 kg)   01/02/18 136 lb (61.7 kg)              We Performed the Following     ADMIN 1st VACCINE     TB INTRADERMAL TEST     TDAP, IM (10 - 64 YRS) - [facility]        Primary Care Provider Office Phone # Fax #    Maximus Kemp [phone]       [address] [place] [address] E  [facility] [address]        Equal Access to Services     [facility] : Hadii aad ku hadasho Sokaren, waaxda luqadaha, qaybta kaalmada adeegyada, waxgamaliel corralin alison elizabeth . So [facility] [phone].    ATENCIÓN: Si habla español, tiene a rodriguez disposición servicios gratuitos de asistencia lingüística. [facility] [phone].    We comply with applicable federal civil rights laws and [place] laws. We do not discriminate on the basis of race, color, national origin, age, disability, sex, sexual orientation, or gender identity.            Thank you!     Thank you for choosing [facility]  for your care. Our goal is always to provide you with excellent care. Hearing back from our patients is one way we can continue to improve our services. Please take a few minutes to complete the written survey that you may receive in the mail after your visit with us. Thank you!             Your Updated Medication List - Protect others around you: Learn how to safely use, store and throw away your medicines at www.disposemymeds.org.          This list is accurate as of 8/10/18  2:20 PM.  Always use your most recent med list.                   Brand Name Dispense Instructions for use Diagnosis    albuterol 108 (90 Base) MCG/ACT inhaler    VENTOLIN HFA    1 Inhaler    Inhale 2 puffs into the lungs every 4 hours as needed for shortness of breath /  dyspnea or wheezing INHALE TWO PUFFS INTO LUNGS EVERY 4 HOURS AS NEEDED FOR SHORTNESS OF BREATH, SHAKE BEFORE USING    Unspecified asthma(493.90)       IBUPROFEN PO      Take 600 mg by mouth        levonorgestrel-ethinyl estradiol 0.15-0.03 MG per tablet    SEASONALE    91 tablet    Take 1 tablet by mouth daily    Encounter for initial prescription of contraceptive pills       nicotine polacrilex 4 MG lozenge    CVS NICOTINE POLACRILEX    360 tablet    Place 1 lozenge (4 mg) inside cheek as needed for smoking cessation    Tobacco abuse, Tobacco abuse counseling

## 2018-08-22 ENCOUNTER — ALLIED HEALTH/NURSE VISIT (OUTPATIENT)
Dept: ALLERGY | Facility: OTHER | Age: 28
End: 2018-08-22
Attending: FAMILY MEDICINE
Payer: COMMERCIAL

## 2018-08-22 DIAGNOSIS — Z11.1 VISIT FOR MANTOUX TEST: Primary | ICD-10-CM

## 2018-08-22 PROCEDURE — 86580 TB INTRADERMAL TEST: CPT

## 2018-08-22 NOTE — MR AVS SNAPSHOT
"              After Visit Summary   8/22/2018    Latasha Fisher    MRN: [id number]           Patient Information     Date Of Birth          1990        Visit Information        Provider Department      8/22/2018 1:45 PM HC SHOT ROOM [facility] [place]        Today's Diagnoses     Visit for Mantoux test    -  1       Follow-ups after your visit        Your next 10 appointments already scheduled     Aug 24, 2018  2:15 PM CDT   injection with HC SHOT ROOM   [facility] [place] ([facility] [place] )    [address]  [place] [address]   [phone]              Who to contact     If you have questions or need follow up information about today's clinic visit or your schedule please contact [facility] directly at [phone].  Normal or non-critical lab and imaging results will be communicated to you by MyChart, letter or phone within 4 business days after the clinic has received the results. If you do not hear from us within 7 days, please contact the clinic through MyChart or phone. If you have a critical or abnormal lab result, we will notify you by phone as soon as possible.  Submit refill requests through [employer] or call your pharmacy and they will forward the refill request to us. Please allow 3 business days for your refill to be completed.          Additional Information About Your Visit        MyChart Information     [employer] lets you send messages to your doctor, view your test results, renew your prescriptions, schedule appointments and more. To sign up, go to [email or web address] . Click on \"Log in\" on the left side of the screen, which will take you to the Welcome page. Then click on \"Sign up Now\" on the right side of the page.     You will be asked to enter the access code listed below, as well as some personal information. Please follow the directions to create your username and password.     Your access code is: [email or web address]  Expires: 11/8/2018  2:20 PM   "   Your access code will  in 90 days. If you need help or a new code, please call your [place] clinic or [phone].        Care EveryWhere ID     This is your Care EveryWhere ID. This could be used by other organizations to access your [place] medical records  [id number]         Blood Pressure from Last 3 Encounters:   18 (!) 88/64   18 106/64   18 100/62    Weight from Last 3 Encounters:   18 135 lb (61.2 kg)   18 137 lb (62.1 kg)   18 136 lb (61.7 kg)              We Performed the Following     INJECTION INTRAMUSCULAR OR SUB-Q     TB INTRADERMAL TEST        Primary Care Provider Office Phone # Fax #    Maximus Kemp [phone]       [address] [place] [address]        Equal Access to Services     [facility]: Hadii aad ku hadasho Soomaali, waaxda luqadaha, qaybta kaalmada adeegyada, waxay shaynain hayaan adekim elizabeth . So [facility] [phone].    ATENCIÓN: Si habla español, tiene a rodriguez disposición servicios gratuitos de asistencia lingüística. Llame al [phone].    We comply with applicable federal civil rights laws and [place] laws. We do not discriminate on the basis of race, color, national origin, age, disability, sex, sexual orientation, or gender identity.            Thank you!     Thank you for choosing [facility]  for your care. Our goal is always to provide you with excellent care. Hearing back from our patients is one way we can continue to improve our services. Please take a few minutes to complete the written survey that you may receive in the mail after your visit with us. Thank you!             Your Updated Medication List - Protect others around you: Learn how to safely use, store and throw away your medicines at www.disposemymeds.org.          This list is accurate as of 18  1:45 PM.  Always use your most recent med list.                   Brand Name Dispense Instructions for use Diagnosis     albuterol 108 (90 Base) MCG/ACT inhaler    VENTOLIN HFA    1 Inhaler    Inhale 2 puffs into the lungs every 4 hours as needed for shortness of breath / dyspnea or wheezing INHALE TWO PUFFS INTO LUNGS EVERY 4 HOURS AS NEEDED FOR SHORTNESS OF BREATH, SHAKE BEFORE USING    Unspecified asthma(493.90)       IBUPROFEN PO      Take 600 mg by mouth        levonorgestrel-ethinyl estradiol 0.15-0.03 MG per tablet    SEASONALE    91 tablet    Take 1 tablet by mouth daily    Encounter for initial prescription of contraceptive pills       nicotine polacrilex 4 MG lozenge    CVS NICOTINE POLACRILEX    360 tablet    Place 1 lozenge (4 mg) inside cheek as needed for smoking cessation    Tobacco abuse, Tobacco abuse counseling

## 2018-08-24 ENCOUNTER — ALLIED HEALTH/NURSE VISIT (OUTPATIENT)
Dept: ALLERGY | Facility: OTHER | Age: 28
End: 2018-08-24
Attending: FAMILY MEDICINE
Payer: MEDICAID

## 2018-08-24 DIAGNOSIS — Z11.1 SCREENING EXAMINATION FOR PULMONARY TUBERCULOSIS: Primary | ICD-10-CM

## 2018-08-24 LAB
PPDINDURATION: NORMAL MM (ref 0–5)
PPDREDNESS: NORMAL MM

## 2018-08-24 NOTE — MR AVS SNAPSHOT
"              After Visit Summary   8/24/2018    Latasha Fisher    MRN: [id number]           Patient Information     Date Of Birth          1990        Visit Information        Provider Department      8/24/2018 2:15 PM [facility]        Today's Diagnoses     Screening examination for pulmonary tuberculosis    -  1       Follow-ups after your visit        Your next 10 appointments already scheduled     Aug 24, 2018  2:15 PM CDT   injection with HC SHOT ROOM   [facility] ([facility]    [place] [address]   [phone]              Who to contact     If you have questions or need follow up information about today's clinic visit or your schedule please contact [facility] directly at [phone].  Normal or non-critical lab and imaging results will be communicated to you by MyChart, letter or phone within 4 business days after the clinic has received the results. If you do not hear from us within 7 days, please contact the clinic through MyChart or phone. If you have a critical or abnormal lab result, we will notify you by phone as soon as possible.  Submit refill requests through [employer] or call your pharmacy and they will forward the refill request to us. Please allow 3 business days for your refill to be completed.          Additional Information About Your Visit        MyChart Information     [employer] lets you send messages to your doctor, view your test results, renew your prescriptions, schedule appointments and more. To sign up, go to [email or web address] . Click on \"Log in\" on the left side of the screen, which will take you to the Welcome page. Then click on \"Sign up Now\" on the right side of the page.     You will be asked to enter the access code listed below, as well as some personal information. Please follow the directions to create your username and password.     Your access code is: " [email or web address]  Expires: 2018  2:20 PM     Your access code will  in 90 days. If you need help or a new code, please call your [place] clinic or [phone].        Care EveryWhere ID     This is your Care EveryWhere ID. This could be used by other organizations to access your [place] medical records  [id number]         Blood Pressure from Last 3 Encounters:   18 (!) 88/64   18 106/64   18 100/62    Weight from Last 3 Encounters:   18 135 lb (61.2 kg)   18 137 lb (62.1 kg)   18 136 lb (61.7 kg)              Today, you had the following     No orders found for display       Primary Care Provider Office Phone # Fax #    Maximus Kemp [phone]       [address] [place] [address]        Equal Access to Services     RADHA GRESHAM : Hadii aad ku hadasho Soomaali, waaxda luqadaha, qaybta kaalmada adeegyada, waxay idiin hayaan chelsea elizabeth . So [facility] [phone].    ATENCIÓN: Si habla español, tiene a rodriguez disposición servicios gratuitos de asistencia lingüística. Llame al [phone].    We comply with applicable federal civil rights laws and [place] laws. We do not discriminate on the basis of race, color, national origin, age, disability, sex, sexual orientation, or gender identity.            Thank you!     Thank you for choosing [facility]  for your care. Our goal is always to provide you with excellent care. Hearing back from our patients is one way we can continue to improve our services. Please take a few minutes to complete the written survey that you may receive in the mail after your visit with us. Thank you!             Your Updated Medication List - Protect others around you: Learn how to safely use, store and throw away your medicines at www.disposemymeds.org.          This list is accurate as of 18  2:02 PM.  Always use your most recent med list.                   Brand Name Dispense Instructions for  use Diagnosis    albuterol 108 (90 Base) MCG/ACT inhaler    VENTOLIN HFA    1 Inhaler    Inhale 2 puffs into the lungs every 4 hours as needed for shortness of breath / dyspnea or wheezing INHALE TWO PUFFS INTO LUNGS EVERY 4 HOURS AS NEEDED FOR SHORTNESS OF BREATH, SHAKE BEFORE USING    Unspecified asthma(493.90)       IBUPROFEN PO      Take 600 mg by mouth        levonorgestrel-ethinyl estradiol 0.15-0.03 MG per tablet    SEASONALE    91 tablet    Take 1 tablet by mouth daily    Encounter for initial prescription of contraceptive pills       nicotine polacrilex 4 MG lozenge    CVS NICOTINE POLACRILEX    360 tablet    Place 1 lozenge (4 mg) inside cheek as needed for smoking cessation    Tobacco abuse, Tobacco abuse counseling

## 2018-09-10 ENCOUNTER — TELEPHONE (OUTPATIENT)
Dept: FAMILY MEDICINE | Facility: OTHER | Age: 28
End: 2018-09-10

## 2018-09-10 DIAGNOSIS — Z30.011 ENCOUNTER FOR INITIAL PRESCRIPTION OF CONTRACEPTIVE PILLS: ICD-10-CM

## 2018-09-10 RX ORDER — LEVONORGESTREL AND ETHINYL ESTRADIOL 0.15-0.03
1 KIT ORAL DAILY
Qty: 91 TABLET | Refills: 0 | Status: SHIPPED | OUTPATIENT
Start: 2018-09-10 | End: 2018-12-12

## 2018-09-10 NOTE — TELEPHONE ENCOUNTER
3:17 PM    Reason for Call: Phone Call    Description: pt has no refills left on birth control.  Wondering if she needs to be seen in order to get refills    Was an appointment offered for this call? No  If yes : Appointment type              Date    Preferred method for responding to this message: Telephone Call  What is your phone number ?  [phone]    If we cannot reach you directly, may we leave a detailed response at the number you provided? Yes    Can this message wait until your PCP/provider returns, if available today? Not applicable, provider is in    [place]

## 2018-10-03 ENCOUNTER — HOSPITAL ENCOUNTER (EMERGENCY)
Facility: HOSPITAL | Age: 28
Discharge: HOME | End: 2018-10-03
Attending: NURSE PRACTITIONER | Admitting: NURSE PRACTITIONER
Payer: MEDICAID

## 2018-10-03 VITALS
RESPIRATION RATE: 17 BRPM | HEIGHT: 65 IN | DIASTOLIC BLOOD PRESSURE: 67 MMHG | HEART RATE: 92 BPM | SYSTOLIC BLOOD PRESSURE: 120 MMHG | BODY MASS INDEX: 23.32 KG/M2 | OXYGEN SATURATION: 97 % | WEIGHT: 140 LBS | TEMPERATURE: 97.9 F

## 2018-10-03 DIAGNOSIS — N39.0 URINARY TRACT INFECTION WITHOUT HEMATURIA, SITE UNSPECIFIED: ICD-10-CM

## 2018-10-03 LAB
ALBUMIN UR-MCNC: 10 MG/DL
APPEARANCE UR: ABNORMAL
BACTERIA #/AREA URNS HPF: ABNORMAL /HPF
BILIRUB UR QL STRIP: NEGATIVE
COLOR UR AUTO: YELLOW
GLUCOSE UR STRIP-MCNC: NEGATIVE MG/DL
HGB UR QL STRIP: ABNORMAL
KETONES UR STRIP-MCNC: NEGATIVE MG/DL
LEUKOCYTE ESTERASE UR QL STRIP: ABNORMAL
MUCOUS THREADS #/AREA URNS LPF: PRESENT /LPF
NITRATE UR QL: POSITIVE
PH UR STRIP: 5.5 PH (ref 4.7–8)
RBC #/AREA URNS AUTO: 3 /HPF (ref 0–2)
SOURCE: ABNORMAL
SP GR UR STRIP: 1.02 (ref 1–1.03)
UROBILINOGEN UR STRIP-MCNC: NORMAL MG/DL (ref 0–2)
WBC #/AREA URNS AUTO: 47 /HPF (ref 0–5)

## 2018-10-03 PROCEDURE — G0463 HOSPITAL OUTPT CLINIC VISIT: HCPCS

## 2018-10-03 PROCEDURE — 87186 SC STD MICRODIL/AGAR DIL: CPT | Performed by: NURSE PRACTITIONER

## 2018-10-03 PROCEDURE — 87088 URINE BACTERIA CULTURE: CPT | Performed by: NURSE PRACTITIONER

## 2018-10-03 PROCEDURE — 81001 URINALYSIS AUTO W/SCOPE: CPT | Performed by: NURSE PRACTITIONER

## 2018-10-03 PROCEDURE — 87086 URINE CULTURE/COLONY COUNT: CPT | Performed by: NURSE PRACTITIONER

## 2018-10-03 PROCEDURE — 99213 OFFICE O/P EST LOW 20 MIN: CPT | Performed by: NURSE PRACTITIONER

## 2018-10-03 RX ORDER — NITROFURANTOIN 25; 75 MG/1; MG/1
100 CAPSULE ORAL 2 TIMES DAILY
Qty: 14 CAPSULE | Refills: 0 | Status: SHIPPED | OUTPATIENT
Start: 2018-10-03 | End: 2018-12-12

## 2018-10-03 RX ORDER — FLUCONAZOLE 150 MG/1
TABLET ORAL
Qty: 2 TABLET | Refills: 0 | Status: SHIPPED | OUTPATIENT
Start: 2018-10-03 | End: 2019-03-16

## 2018-10-03 ASSESSMENT — ENCOUNTER SYMPTOMS
NAUSEA: 0
WEAKNESS: 0
ABDOMINAL PAIN: 0
FREQUENCY: 1
PSYCHIATRIC NEGATIVE: 1
FEVER: 0
APPETITE CHANGE: 0
DYSURIA: 1
HEMATURIA: 0
DIARRHEA: 0
VOMITING: 0
TROUBLE SWALLOWING: 0
CHILLS: 0
FLANK PAIN: 0
DIFFICULTY URINATING: 0
ACTIVITY CHANGE: 0
COUGH: 0
BACK PAIN: 0

## 2018-10-03 NOTE — DISCHARGE INSTRUCTIONS
Take antibiotics as ordered.   Eat a yogurt daily while taking antibiotics.   Take Diflucan as ordered.   Increase water intake.   Take tylenol and/or ibuprofen for discomfort. Follow dosing on package.   Follow up with PCP with any increase in symptoms or concerns.   Return to urgent care or emergency department with any increase in symptoms or concerns.

## 2018-10-03 NOTE — ED PROVIDER NOTES
History     Chief Complaint   Patient presents with     Rule out Urinary Tract Infection     Frequency, urgency, buring with urination for about 10 days     The history is provided by the patient. No  was used.     Latasha Fisher is a 28 year old female who presents with urinary urgency, frequency, and burning. Denies fever, chills, or night sweats. Eating and drinking well. Bowel is working well. No antibiotic use in the past 30 days.     Denies chance of pregnancy.       Problem List:    Patient Active Problem List    Diagnosis Date Noted     ACP (advance care planning) 09/27/2017     Priority: Medium     Advance Care Planning 9/27/2017: ACP Review of Chart / Resources Provided:  Reviewed chart for advance care plan.  Latasha Fisher has no plan or code status on file. Discussed available resources and provided with information.   Added by Ania Benitez             Introital dyspareunia 07/18/2017     Priority: Medium     Retained complete placenta 01/04/2016     Priority: Medium     Removed manually and currettaged. No excess blood loss noted       Papanicolaou smear of cervix with low grade squamous intraepithelial lesion (LGSIL) 08/25/2015     Priority: Medium     5/15.  [facility].   LGSIL, + HR HPV.  F/u pap pp.       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 06/04/2008     Priority: Medium     Pain in joint, lower leg 05/29/2008     Priority: Medium     Notalgia 08/25/2006     Priority: Medium     Overview:   IMO Update 10/11       Pes planus 08/25/2006     Priority: Medium     Overview:   IMO Update 10  2016       Scoliosis (and kyphoscoliosis), idiopathic 07/26/2005     Priority: Medium     Overview:   Mild          Past Medical History:    Past Medical History:   Diagnosis Date     Acute pancreatitis child     Hepatitis remote     Scoliosis      Tobacco abuse        Past Surgical History:    Past Surgical History:   Procedure Laterality Date     KNEE SURGERY       LAPAROSCOPIC APPENDECTOMY  4/23/2013    Procedure: LAPAROSCOPIC APPENDECTOMY;  LAPAROSCOPIC APPENDECTOMY;  Surgeon: Paula Roberts MD;  Location: [facility]       Family History:    Family History   Problem Relation Age of Onset     Thyroid Disease Mother      Diabetes Father      Asthma Sister      Attention Deficit Disorder Brother        Social History:  Marital Status:  Single [1]  Social History   Substance Use Topics     Smoking status: Current Every Day Smoker     Packs/day: 0.50     Years: 4.00     Smokeless tobacco: Never Used      Comment: Trying to quit     Alcohol use No      Comment: occasionally when she goes out' past hx heavier consumption        Medications:      albuterol (VENTOLIN HFA) 108 (90 BASE) MCG/ACT inhaler   fluconazole (DIFLUCAN) 150 MG tablet   IBUPROFEN PO   levonorgestrel-ethinyl estradiol (SEASONALE) 0.15-0.03 MG per tablet   nicotine polacrilex ([employer]S NICOTINE POLACRILEX) 4 MG lozenge   nitroFURantoin, macrocrystal-monohydrate, (MACROBID) 100 MG capsule         Review of Systems   Constitutional: Negative for activity change, appetite change, chills and fever.   HENT: Negative for trouble swallowing.    Respiratory: Negative for cough.    Gastrointestinal: Negative for abdominal pain, diarrhea, nausea and vomiting.   Genitourinary: Positive for dysuria, frequency and urgency. Negative for decreased urine volume, difficulty urinating, flank pain, hematuria, pelvic pain, vaginal bleeding, vaginal discharge and vaginal pain.        Bladder fullness and pressure.    Musculoskeletal: Negative for back pain.   Neurological:  "Negative for weakness.   Psychiatric/Behavioral: Negative.        Physical Exam   BP: 120/67  Pulse: 92  Temp: 97.9  F (36.6  C)  Resp: 17  Height: 165.1 cm (5' 5\")  Weight: 63.5 kg (140 lb) (stated)  SpO2: 97 %      Physical Exam   Constitutional: She is oriented to person, place, and time. She appears well-developed and well-nourished. No distress.   HENT:   Head: Normocephalic.   Mouth/Throat: Oropharynx is clear and moist.   Neck: Normal range of motion. Neck supple.   Cardiovascular: Normal rate, regular rhythm and normal heart sounds.    No murmur heard.  Pulmonary/Chest: Effort normal. No respiratory distress. She has no wheezes. She has no rales.   Abdominal: Soft. She exhibits no distension.   Genitourinary:   Genitourinary Comments: Negative bilateral CVA tenderness.    Musculoskeletal: Normal range of motion.   Neurological: She is alert and oriented to person, place, and time. She exhibits normal muscle tone.   Skin: Skin is warm and dry. No rash noted. She is not diaphoretic.   Psychiatric: She has a normal mood and affect. Her behavior is normal.   Nursing note and vitals reviewed.      ED Course     ED Course     Procedures    Results for orders placed or performed during the hospital encounter of 10/03/18   UA reflex to Microscopic and Culture   Result Value Ref Range    Color Urine Yellow     Appearance Urine Slightly Cloudy     Glucose Urine Negative NEG^Negative mg/dL    Bilirubin Urine Negative NEG^Negative    Ketones Urine Negative NEG^Negative mg/dL    Specific Gravity Urine 1.018 1.003 - 1.035    Blood Urine Trace (A) NEG^Negative    pH Urine 5.5 4.7 - 8.0 pH    Protein Albumin Urine 10 (A) NEG^Negative mg/dL    Urobilinogen mg/dL Normal 0.0 - 2.0 mg/dL    Nitrite Urine Positive (A) NEG^Negative    Leukocyte Esterase Urine Moderate (A) NEG^Negative    Source Midstream Urine     RBC Urine 3 (H) 0 - 2 /HPF    WBC Urine 47 (H) 0 - 5 /HPF    Bacteria Urine Few (A) NEG^Negative /HPF    Mucous Urine " Present (A) NEG^Negative /LPF       Assessments & Plan (with Medical Decision Making)     Discussed plan of care. She verbalized understanding. All questions answered.     I have reviewed the nursing notes.    I have reviewed the findings, diagnosis, plan and need for follow up with the patient.  Discharged in stable condition.     New Prescriptions    FLUCONAZOLE (DIFLUCAN) 150 MG TABLET    Take one tablet now, and one tablet in three days    NITROFURANTOIN, MACROCRYSTAL-MONOHYDRATE, (MACROBID) 100 MG CAPSULE    Take 1 capsule (100 mg) by mouth 2 times daily       Final diagnoses:   Urinary tract infection without hematuria, site unspecified     Take antibiotics as ordered.   Eat a yogurt daily while taking antibiotics.   Take Diflucan as ordered.   Increase water intake.   Take tylenol and/or ibuprofen for discomfort. Follow dosing on package.   Follow up with PCP with any increase in symptoms or concerns.   Return to urgent care or emergency department with any increase in symptoms or concerns.     JOHN Mann  10/3/2018  6:36 PM  URGENT CARE CLINIC       Carolann Hughes NP  10/03/18 [id number]

## 2018-10-03 NOTE — ED AVS SNAPSHOT
[facility]    [address]    [facility] [address]    Phone:  [phone]                                       Latasha Fisher   MRN: [id number]    Department:  [facility]   Date of Visit:  10/3/2018           Patient Information     Date Of Birth          1990        Your diagnoses for this visit were:     Urinary tract infection without hematuria, site unspecified        You were seen by Carolann Hughes NP.      Follow-up Information     Follow up with Maximus Kemp MD.    Specialty:  Family Practice    Why:  As needed, If symptoms worsen    Contact information:    [address]  [facility] [id number] [phone]          Follow up with [facility].    Specialty:  EMERGENCY MEDICINE    Why:  As needed, If symptoms worsen    Contact information:    [address]  [facility] [address] [phone]    Additional information:    From [facility]: Take [address]. Turn left at [address]. Turn left at the first stoplight on [address]. At the first stop sign, take a right onto [address]. Take a left into the parking lot and continue through until you reach the North enterance of the building.       From [place]: Take [address]. Take the [address] ramp towards [place]. Turn left onto [address]. Take a slight right onto [address]. Turn left at the first stoplight on [address]. At the first stop sign, take a right onto [address]. Take a left into the parking lot and continue through until you reach the North enterance of the building.       From [place]: Take [address]. Take a right at [address]. At the first stop sign, take a right onto [address]. Take a left into the parking lot and continue through until you reach the North enterance of the building.         Discharge Instructions       Take antibiotics as ordered.   Eat a yogurt daily while  taking antibiotics.   Take Diflucan as ordered.   Increase water intake.   Take tylenol and/or ibuprofen for discomfort. Follow dosing on package.   Follow up with PCP with any increase in symptoms or concerns.   Return to urgent care or emergency department with any increase in symptoms or concerns.     Discharge References/Attachments     URINARY TRACT INFECTIONS IN WOMEN (ENGLISH)         Review of your medicines      START taking        Dose / Directions Last dose taken    fluconazole 150 MG tablet   Commonly known as:  DIFLUCAN   Quantity:  2 tablet        Take one tablet now, and one tablet in three days   Refills:  0        nitroFURantoin (macrocrystal-monohydrate) 100 MG capsule   Commonly known as:  MACROBID   Dose:  100 mg   Quantity:  14 capsule        Take 1 capsule (100 mg) by mouth 2 times daily   Refills:  0          Our records show that you are taking the medicines listed below. If these are incorrect, please call your family doctor or clinic.        Dose / Directions Last dose taken    albuterol 108 (90 Base) MCG/ACT inhaler   Commonly known as:  VENTOLIN HFA   Dose:  2 puff   Quantity:  1 Inhaler        Inhale 2 puffs into the lungs every 4 hours as needed for shortness of breath / dyspnea or wheezing INHALE TWO PUFFS INTO LUNGS EVERY 4 HOURS AS NEEDED FOR SHORTNESS OF BREATH, SHAKE BEFORE USING   Refills:  0        IBUPROFEN PO   Dose:  600 mg        Take 600 mg by mouth   Refills:  0        levonorgestrel-ethinyl estradiol 0.15-0.03 MG per tablet   Commonly known as:  SEASONALE   Dose:  1 tablet   Quantity:  91 tablet        Take 1 tablet by mouth daily   Refills:  0        nicotine polacrilex 4 MG lozenge   Commonly known as:  CVS NICOTINE POLACRILEX   Dose:  4 mg   Quantity:  360 tablet        Place 1 lozenge (4 mg) inside cheek as needed for smoking cessation   Refills:  0                Prescriptions were sent or printed at these locations (2 Prescriptions)                   [facility]  "[id number]   [address] , [place] [address]    Telephone:  [phone]   Fax:  [phone]   Hours:                  E-Prescribed (2 of 2)         fluconazole (DIFLUCAN) 150 MG tablet               nitroFURantoin, macrocrystal-monohydrate, (MACROBID) 100 MG capsule                Procedures and tests performed during your visit     UA reflex to Microscopic and Culture    Urine Culture Aerobic Bacterial      Orders Needing Specimen Collection     None      Pending Results     Date and Time Order Name Status Description    10/3/2018 1849 Urine Culture Aerobic Bacterial In process             Pending Culture Results     Date and Time Order Name Status Description    10/3/2018 1849 Urine Culture Aerobic Bacterial In process             Thank you for choosing [facility]       Thank you for choosing [facility] for your care. Our goal is always to provide you with excellent care. Hearing back from our patients is one way we can continue to improve our services. Please take a few minutes to complete the written survey that you may receive in the mail after you visit with us. Thank you!        Kyp Information     Kyp lets you send messages to your doctor, view your test results, renew your prescriptions, schedule appointments and more. To sign up, go to [email or web address] . Click on \"Log in\" on the left side of the screen, which will take you to the Welcome page. Then click on \"Sign up Now\" on the right side of the page.     You will be asked to enter the access code listed below, as well as some personal information. Please follow the directions to create your username and password.     Your access code is: [email or web address]  Expires: 2018  2:20 PM     Your access code will  in 90 days. If you need help or a new code, please call your [facility] clinic or [phone].        Care EveryWhere ID     This is your Care EveryWhere ID. This could be used by other organizations to access " your [facility] medical records  [id number]        Equal Access to Services     RADHA GRESHAM : Sobia Romo, tobi cordero, annie srinivasan. So [facility] [phone].    ATENCIÓN: Si habla español, tiene a rodriguez disposición servicios gratuitos de asistencia lingüística. Llame al [phone].    We comply with applicable federal civil rights laws and [place] laws. We do not discriminate on the basis of race, color, national origin, age, disability, sex, sexual orientation, or gender identity.            After Visit Summary       This is your record. Keep this with you and show to your community pharmacist(s) and doctor(s) at your next visit.

## 2018-10-03 NOTE — ED AVS SNAPSHOT
[facility]    [address] [address]    Phone:  [phone]                                       Latasha Fisher   MRN: [id number]    Department:  [facility]   Date of Visit:  10/3/2018           After Visit Summary Signature Page     I have received my discharge instructions, and my questions have been answered. I have discussed any challenges I see with this plan with the nurse or doctor.    ..........................................................................................................................................  Patient/Patient Representative Signature      ..........................................................................................................................................  Patient Representative Print Name and Relationship to Patient    ..................................................               ................................................  Date                                   Time    ..........................................................................................................................................  Reviewed by Signature/Title    ...................................................              ..............................................  Date                                               Time          22EPIC Rev 08/18

## 2018-10-03 NOTE — ED TRIAGE NOTES
Pt presents today with c/o possible UTI. Symptoms: burning, frequency, and odor. Started 1.5 weeks ago. Rates pain at 0. History of UTIs.

## 2018-10-05 LAB
BACTERIA SPEC CULT: ABNORMAL
SPECIMEN SOURCE: ABNORMAL

## 2018-10-10 ENCOUNTER — ALLIED HEALTH/NURSE VISIT (OUTPATIENT)
Dept: FAMILY MEDICINE | Facility: OTHER | Age: 28
End: 2018-10-10
Attending: FAMILY MEDICINE
Payer: MEDICAID

## 2018-10-10 DIAGNOSIS — Z23 NEED FOR PROPHYLACTIC VACCINATION AND INOCULATION AGAINST INFLUENZA: Primary | ICD-10-CM

## 2018-10-10 PROCEDURE — 90471 IMMUNIZATION ADMIN: CPT

## 2018-10-10 PROCEDURE — 90686 IIV4 VACC NO PRSV 0.5 ML IM: CPT

## 2018-10-10 NOTE — MR AVS SNAPSHOT
"              After Visit Summary   10/10/2018    Latasha Fisher    MRN: [id number]           Patient Information     Date Of Birth          1990        Visit Information        Provider Department      10/10/2018 2:50 PM HC FLU SHOT CLINIC [facility] [place]        Today's Diagnoses     Need for prophylactic vaccination and inoculation against influenza    -  1       Follow-ups after your visit        Your next 10 appointments already scheduled     Oct 10, 2018  2:50 PM CDT   (Arrive by 2:35 PM)   Flu Shot with HC FLU SHOT CLINIC   [facility] [place] ([facility] [place] )    [address]  [place] [address]   [phone]              Who to contact     If you have questions or need follow up information about today's clinic visit or your schedule please contact [facility] directly at [phone].  Normal or non-critical lab and imaging results will be communicated to you by MyChart, letter or phone within 4 business days after the clinic has received the results. If you do not hear from us within 7 days, please contact the clinic through MyChart or phone. If you have a critical or abnormal lab result, we will notify you by phone as soon as possible.  Submit refill requests through [employer] or call your pharmacy and they will forward the refill request to us. Please allow 3 business days for your refill to be completed.          Additional Information About Your Visit        MyChart Information     [email or web address] lets you send messages to your doctor, view your test results, renew your prescriptions, schedule appointments and more. To sign up, go to [email or web address] . Click on \"Log in\" on the left side of the screen, which will take you to the Welcome page. Then click on \"Sign up Now\" on the right side of the page.     You will be asked to enter the access code listed below, as well as some personal information. Please follow the " directions to create your username and password.     Your access code is: [email or web address]  Expires: 2018  2:20 PM     Your access code will  in 90 days. If you need help or a new code, please call your [facility] clinic or [phone].        Care EveryWhere ID     This is your Care EveryWhere ID. This could be used by other organizations to access your [facility] medical records  [id number]         Blood Pressure from Last 3 Encounters:   10/03/18 120/67   18 (!) 88/64   18 106/64    Weight from Last 3 Encounters:   10/03/18 140 lb (63.5 kg)   18 135 lb (61.2 kg)   18 137 lb (62.1 kg)              We Performed the Following     HC FLU VAC PRESRV FREE QUAD SPLIT VIR 3+YRS IM     Vaccine Administration, Initial [[id number]]        Primary Care Provider Office Phone # Fax #    Maximus Kemp [phone]       [address] [place] [address]        Equal Access to Services     [facility]: Hadii aad ku hadasho Soomaali, waaxda luqadaha, qaybta kaalmada adeegyatamia, annie elizabeth . So [facility] [phone].    ATENCIÓN: Si habla español, tiene a rodriguez disposición servicios gratuitos de asistencia lingüística. Timmy al [phone].    We comply with applicable federal civil rights laws and [place] laws. We do not discriminate on the basis of race, color, national origin, age, disability, sex, sexual orientation, or gender identity.            Thank you!     Thank you for choosing [facility]  for your care. Our goal is always to provide you with excellent care. Hearing back from our patients is one way we can continue to improve our services. Please take a few minutes to complete the written survey that you may receive in the mail after your visit with us. Thank you!             Your Updated Medication List - Protect others around you: Learn how to safely use, store and throw away your medicines at www.disposemymeds.org.           This list is accurate as of 10/10/18  2:42 PM.  Always use your most recent med list.                   Brand Name Dispense Instructions for use Diagnosis    albuterol 108 (90 Base) MCG/ACT inhaler    VENTOLIN HFA    1 Inhaler    Inhale 2 puffs into the lungs every 4 hours as needed for shortness of breath / dyspnea or wheezing INHALE TWO PUFFS INTO LUNGS EVERY 4 HOURS AS NEEDED FOR SHORTNESS OF BREATH, SHAKE BEFORE USING    Unspecified asthma(493.90)       IBUPROFEN PO      Take 600 mg by mouth        levonorgestrel-ethinyl estradiol 0.15-0.03 MG per tablet    SEASONALE    91 tablet    Take 1 tablet by mouth daily    Encounter for initial prescription of contraceptive pills       nicotine polacrilex 4 MG lozenge    CVS NICOTINE POLACRILEX    360 tablet    Place 1 lozenge (4 mg) inside cheek as needed for smoking cessation    Tobacco abuse, Tobacco abuse counseling       nitroFURantoin (macrocrystal-monohydrate) 100 MG capsule    MACROBID    14 capsule    Take 1 capsule (100 mg) by mouth 2 times daily

## 2018-10-10 NOTE — PROGRESS NOTES

## 2018-11-15 ENCOUNTER — TELEPHONE (OUTPATIENT)
Dept: FAMILY MEDICINE | Facility: OTHER | Age: 28
End: 2018-11-15

## 2018-11-15 DIAGNOSIS — Z71.6 ENCOUNTER FOR SMOKING CESSATION COUNSELING: Primary | ICD-10-CM

## 2018-11-15 RX ORDER — NICOTINE 21 MG/24HR
1 PATCH, TRANSDERMAL 24 HOURS TRANSDERMAL EVERY 24 HOURS
Qty: 30 PATCH | Refills: 0 | Status: SHIPPED | OUTPATIENT
Start: 2018-11-15 | End: 2019-03-16

## 2018-11-15 NOTE — TELEPHONE ENCOUNTER
Patient called and tried the nicotine lozenges with no relief. She has been using the nicotine patch 21mg daily for one week and has been smoke free. She wants an RX for this sent to [facility]. She smoked about a pack a day before.Thank you.

## 2018-12-07 NOTE — PROGRESS NOTES
SUBJECTIVE:   CC: Latasha Fisher is an 28 year old woman who presents for preventive health visit.     Healthy Habits:    Do you get at least three servings of calcium containing foods daily (dairy, green leafy vegetables, etc.)? yes    Amount of exercise or daily activities, outside of work: 5 day(s) per week    Problems taking medications regularly No    Medication side effects: No    Have you had an eye exam in the past two years? Yes, has one coming up next week    Do you see a dentist twice per year? yes    Do you have sleep apnea, excessive snoring or daytime drowsiness?yes, daytime drowsiness      PROBLEMS TO ADD ON...    Today's PHQ-2 Score: No flowsheet data found.    Abuse: Current or Past(Physical, Sexual or Emotional)- No  Do you feel safe in your environment? Yes    Social History   Substance Use Topics     Smoking status: Current Every Day Smoker     Packs/day: 0.50     Years: 4.00     Smokeless tobacco: Never Used      Comment: Trying to quit     Alcohol use No      Comment: occasionally when she goes out' past hx heavier consumption     If you drink alcohol do you typically have >3 drinks per day or >7 drinks per week? No                     Reviewed orders with patient.  Reviewed health maintenance and updated orders accordingly - Yes  Labs reviewed in EPIC    Mammogram not appropriate for this patient based on age.    Pertinent mammograms are reviewed under the imaging tab.  History of abnormal Pap smear: YES - updated in Problem List and Health Maintenance accordingly  PAP / HPV 3/4/2016   PAP NIL     Reviewed and updated as needed this visit by clinical staff         Reviewed and updated as needed this visit by Provider        Past Medical History:   Diagnosis Date     Acute pancreatitis child    post treuma, from a fall     Hepatitis remote    transient, probably from alcohol     Scoliosis      Tobacco abuse       Past Surgical History:   Procedure Laterality Date     KNEE SURGERY        LAPAROSCOPIC APPENDECTOMY  2013    Procedure: LAPAROSCOPIC APPENDECTOMY;  LAPAROSCOPIC APPENDECTOMY;  Surgeon: Paula Roberts MD;  Location: [facility]     Obstetric History       T2      L2     SAB0   TAB0   Ectopic0   Multiple0   Live Births2       # Outcome Date GA Lbr Isaac/2nd Weight Sex Delivery Anes PTL Lv   2 Term 16 40w1d 02:27 / 00:29 3.17 kg (6 lb 15.8 oz) M Vag-Spont INT N AMY      Apgar1:  9                Apgar5: 9   1 Term 10/29/10 40w2d  4.054 kg (8 lb 15 oz) M Vag-Spont INT N AMY      Name: Chava      Obstetric Comments   Breast and bottle       ROS:  CONSTITUTIONAL: NEGATIVE for fever, chills, change in weight  INTEGUMENTARU/SKIN: NEGATIVE for worrisome rashes, moles or lesions  EYES: NEGATIVE for vision changes or irritation  ENT: NEGATIVE for ear, mouth and throat problems  RESP: NEGATIVE for significant cough or SOB  BREAST: NEGATIVE for masses, tenderness or discharge  CV: NEGATIVE for chest pain, palpitations or peripheral edema  GI: NEGATIVE for nausea, abdominal pain, heartburn, or change in bowel habits   female: change in menses, slight.  Had negative HPT.  Having vaginal discharge.   MUSCULOSKELETAL: NEGATIVE for significant arthralgias or myalgia  NEURO: NEGATIVE for weakness, dizziness or paresthesias  PSYCHIATRIC: NEGATIVE for changes in mood or affect    OBJECTIVE:   There were no vitals taken for this visit.  EXAM:  GENERAL: healthy, alert and no distress  EYES: Eyes grossly normal to inspection, PERRL and conjunctivae and sclerae normal  HENT: ear canals and TM's normal, nose and mouth without ulcers or lesions  NECK: no adenopathy, no asymmetry, masses, or scars and thyroid normal to palpation  RESP: lungs clear to auscultation - no rales, rhonchi or wheezes  BREAST: normal without masses, tenderness or nipple discharge and no palpable axillary masses or adenopathy  CV: regular rate and rhythm, normal S1 S2, no S3 or S4, no murmur, click or rub, no  "peripheral edema and peripheral pulses strong  ABDOMEN: soft, nontender, no hepatosplenomegaly, no masses and bowel sounds normal   (female): normal female external genitalia, normal urethral meatus , vaginal mucosa pink, moist, well rugated, cervix friable and excess yellowish discharge noted.  Wet prep and gc/chlam obtained.    MS: no gross musculoskeletal defects noted, no edema  SKIN: no suspicious lesions or rashes.  Tinea versicolor on the back.     NEURO: Normal strength and tone, mentation intact and speech normal  PSYCH: mentation appears normal, affect normal/bright    Diagnostic Test Results:  none     ASSESSMENT/PLAN:       ICD-10-CM    1. Routine general medical examination at a health care facility Z00.00 Lipid Profile (Chol, Trig, HDL, LDL calc)     Comprehensive metabolic panel (BMP + Alb, Alk Phos, ALT, AST, Total. Bili, TP)   2. PKD (polycystic kidney disease) Q61.3    3. Encounter for initial prescription of contraceptive pills Z30.011 levonorgestrel-ethinyl estradiol (SEASONALE) 0.15-0.03 MG tablet   4. Papanicolaou smear of cervix with low grade squamous intraepithelial lesion (LGSIL) R87.612 A pap thin layer screen reflex to HPV if ASCUS - recommend age 25 - 29   5. Tinea versicolor B36.0 ketoconazole (NIZORAL) 2 % external cream   6. Vaginal discharge N89.8 GC/Chlamydia by PCR - [facility]     Wet prep   hx of LGSIL so did pap now rather than wait for 3 years.  Pap pending.  Friable cervix.  Swabs obtained as above.      COUNSELING:   Reviewed preventive health counseling, as reflected in patient instructions    BP Readings from Last 1 Encounters:   10/03/18 120/67     Estimated body mass index is 23.3 kg/(m^2) as calculated from the following:    Height as of 10/3/18: 5' 5\" (1.651 m).    Weight as of 10/3/18: 140 lb (63.5 kg).           reports that she has been smoking.  She has a 2.00 pack-year smoking history. She has never used smokeless tobacco.  Trying to quit and I encouraged. "     Counseling Resources:  ATP IV Guidelines  Pooled Cohorts Equation Calculator  Breast Cancer Risk Calculator  FRAX Risk Assessment  ICSI Preventive Guidelines  Dietary Guidelines for Americans, 2010  USDA's MyPlate  ASA Prophylaxis  Lung CA Screening    Maximus Kemp MD  [facility]

## 2018-12-12 ENCOUNTER — OFFICE VISIT (OUTPATIENT)
Dept: FAMILY MEDICINE | Facility: OTHER | Age: 28
End: 2018-12-12
Attending: FAMILY MEDICINE
Payer: MEDICAID

## 2018-12-12 VITALS
BODY MASS INDEX: 23.3 KG/M2 | TEMPERATURE: 98.5 F | WEIGHT: 140 LBS | HEART RATE: 87 BPM | OXYGEN SATURATION: 98 % | SYSTOLIC BLOOD PRESSURE: 114 MMHG | DIASTOLIC BLOOD PRESSURE: 84 MMHG

## 2018-12-12 DIAGNOSIS — N89.8 VAGINAL DISCHARGE: ICD-10-CM

## 2018-12-12 DIAGNOSIS — R87.612 PAPANICOLAOU SMEAR OF CERVIX WITH LOW GRADE SQUAMOUS INTRAEPITHELIAL LESION (LGSIL): ICD-10-CM

## 2018-12-12 DIAGNOSIS — B36.0 TINEA VERSICOLOR: ICD-10-CM

## 2018-12-12 DIAGNOSIS — Z30.011 ENCOUNTER FOR INITIAL PRESCRIPTION OF CONTRACEPTIVE PILLS: ICD-10-CM

## 2018-12-12 DIAGNOSIS — Q61.3 PKD (POLYCYSTIC KIDNEY DISEASE): ICD-10-CM

## 2018-12-12 DIAGNOSIS — Z00.00 ROUTINE GENERAL MEDICAL EXAMINATION AT A HEALTH CARE FACILITY: Primary | ICD-10-CM

## 2018-12-12 LAB
SPECIMEN SOURCE: NORMAL
WET PREP SPEC: NORMAL

## 2018-12-12 PROCEDURE — 87491 CHLMYD TRACH DNA AMP PROBE: CPT | Mod: ZL | Performed by: FAMILY MEDICINE

## 2018-12-12 PROCEDURE — 87210 SMEAR WET MOUNT SALINE/INK: CPT | Mod: ZL | Performed by: FAMILY MEDICINE

## 2018-12-12 PROCEDURE — 99395 PREV VISIT EST AGE 18-39: CPT | Performed by: FAMILY MEDICINE

## 2018-12-12 PROCEDURE — 87591 N.GONORRHOEAE DNA AMP PROB: CPT | Mod: ZL | Performed by: FAMILY MEDICINE

## 2018-12-12 PROCEDURE — G0123 SCREEN CERV/VAG THIN LAYER: HCPCS | Mod: ZL | Performed by: FAMILY MEDICINE

## 2018-12-12 RX ORDER — KETOCONAZOLE 20 MG/G
CREAM TOPICAL 2 TIMES DAILY
Qty: 30 G | Refills: 1 | Status: SHIPPED | OUTPATIENT
Start: 2018-12-12 | End: 2019-03-16

## 2018-12-12 RX ORDER — LEVONORGESTREL AND ETHINYL ESTRADIOL 0.15-0.03
1 KIT ORAL DAILY
Qty: 91 TABLET | Refills: 3 | Status: SHIPPED | OUTPATIENT
Start: 2018-12-12 | End: 2021-02-25

## 2018-12-12 ASSESSMENT — ANXIETY QUESTIONNAIRES
6. BECOMING EASILY ANNOYED OR IRRITABLE: NEARLY EVERY DAY
3. WORRYING TOO MUCH ABOUT DIFFERENT THINGS: NEARLY EVERY DAY
4. TROUBLE RELAXING: NEARLY EVERY DAY
7. FEELING AFRAID AS IF SOMETHING AWFUL MIGHT HAPPEN: NOT AT ALL
2. NOT BEING ABLE TO STOP OR CONTROL WORRYING: MORE THAN HALF THE DAYS
IF YOU CHECKED OFF ANY PROBLEMS ON THIS QUESTIONNAIRE, HOW DIFFICULT HAVE THESE PROBLEMS MADE IT FOR YOU TO DO YOUR WORK, TAKE CARE OF THINGS AT HOME, OR GET ALONG WITH OTHER PEOPLE: SOMEWHAT DIFFICULT
GAD7 TOTAL SCORE: 15
1. FEELING NERVOUS, ANXIOUS, OR ON EDGE: MORE THAN HALF THE DAYS
5. BEING SO RESTLESS THAT IT IS HARD TO SIT STILL: MORE THAN HALF THE DAYS

## 2018-12-12 ASSESSMENT — PAIN SCALES - GENERAL: PAINLEVEL: NO PAIN (0)

## 2018-12-12 ASSESSMENT — PATIENT HEALTH QUESTIONNAIRE - PHQ9: SUM OF ALL RESPONSES TO PHQ QUESTIONS 1-9: 6

## 2018-12-12 NOTE — NURSING NOTE
"Chief Complaint   Patient presents with     Physical       Initial /84   Pulse 87   Temp 98.5  F (36.9  C) (Tympanic)   Wt 63.5 kg (140 lb)   SpO2 98%   BMI 23.30 kg/m   Estimated body mass index is 23.3 kg/m  as calculated from the following:    Height as of 10/3/18: 1.651 m (5' 5\").    Weight as of this encounter: 63.5 kg (140 lb).  Medication Reconciliation: complete    Alejandrina Chan MA    "

## 2018-12-13 ENCOUNTER — TELEPHONE (OUTPATIENT)
Dept: FAMILY MEDICINE | Facility: OTHER | Age: 28
End: 2018-12-13

## 2018-12-13 DIAGNOSIS — A74.9 CHLAMYDIA: Primary | ICD-10-CM

## 2018-12-13 LAB
C TRACH DNA SPEC QL PROBE+SIG AMP: ABNORMAL
N GONORRHOEA DNA SPEC QL PROBE+SIG AMP: NOT DETECTED
SPECIMEN SOURCE: ABNORMAL

## 2018-12-13 RX ORDER — AZITHROMYCIN 500 MG/1
1000 TABLET, FILM COATED ORAL ONCE
Qty: 2 TABLET | Refills: 0 | Status: SHIPPED | OUTPATIENT
Start: 2018-12-13 | End: 2019-03-16

## 2018-12-13 ASSESSMENT — ANXIETY QUESTIONNAIRES: GAD7 TOTAL SCORE: 15

## 2018-12-18 LAB
COPATH REPORT: NORMAL
PAP: NORMAL

## 2019-01-18 NOTE — PROGRESS NOTES
SUBJECTIVE:                                                    Latasha Fisher is a 28 year old female who presents to clinic today for the following health issues:    Vaginal Bleeding (Dysmenorrhea)      Onset: over a year, got one in June normal on birth control    Description:  Duration of bleeding episodes: about a week normally. Sept only had some spotting not during sugar pills  Frequency between periods:  Almost 7 months since mormal  Describe bleeding/flow:   Clots: no   Number of pads/hour: 1, changes every time she goes to the bathroom  Cramping: severe, before and during for a couple days    Intensity:  mild    Accompanying signs and symptoms: was on seasonale and has stopped seasonale to see if it would regulate out. Has not really gotten much since June of 2018    History (similar episodes/previous evaluation): befrore going on seasonale it was pretty normal    Precipitating or alleviating factors: None    Therapies tried and outcome: None      PROBLEMS TO ADD ON...    Problem list and histories reviewed & adjusted, as indicated.  Additional history: as documented.  Recent chlamydia, unexpected found on exam.  Stopped pill due to no menses since June.  Has been on seasonale less than a year.     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     Autosomal dominant polycystic kidney disease     Scoliosis (and kyphoscoliosis), idiopathic     Retained complete placenta     Introital dyspareunia     ACP (advance care planning)     Past Surgical History:   Procedure Laterality Date     KNEE SURGERY       LAPAROSCOPIC APPENDECTOMY  4/23/2013    Procedure: LAPAROSCOPIC APPENDECTOMY;   LAPAROSCOPIC APPENDECTOMY;  Surgeon: Paula Roberts MD;  Location: [facility]       Social History     Tobacco Use     Smoking status: Current Every Day Smoker     Packs/day: 0.50     Years: 4.00     Pack years: 2.00     Smokeless tobacco: Never Used     Tobacco comment: Trying to quit   Substance Use Topics     Alcohol use: No     Alcohol/week: 3.0 oz     Types: 6 Cans of beer per week     Comment: occasionally when she goes out' past hx heavier consumption     Family History   Problem Relation Age of Onset     Thyroid Disease Mother      Diabetes Father      Asthma Sister      Attention Deficit Disorder Brother          Current Outpatient Medications   Medication Sig Dispense Refill     albuterol (VENTOLIN HFA) 108 (90 BASE) MCG/ACT inhaler Inhale 2 puffs into the lungs every 4 hours as needed for shortness of breath / dyspnea or wheezing INHALE TWO PUFFS INTO LUNGS EVERY 4 HOURS AS NEEDED FOR SHORTNESS OF BREATH, SHAKE BEFORE USING 1 Inhaler 0     IBUPROFEN PO Take 600 mg by mouth       ketoconazole (NIZORAL) 2 % external cream Apply topically 2 times daily 30 g 1     levonorgestrel-ethinyl estradiol (SEASONALE) 0.15-0.03 MG tablet Take 1 tablet by mouth daily 91 tablet 3     nicotine (NICODERM CQ) 21 MG/24HR 24 hr patch Place 1 patch onto the skin every 24 hours 30 patch 0     nicotine polacrilex ([employer] NICOTINE POLACRILEX) 4 MG lozenge Place 1 lozenge (4 mg) inside cheek as needed for smoking cessation 360 tablet 0     No Known Allergies    ROS:  Constitutional, HEENT, cardiovascular, pulmonary, gi and gu systems are negative, except as otherwise noted.    OBJECTIVE:                                                    BP 92/62   Pulse 100   Temp 99.2  F (37.3  C) (Tympanic)   Wt 65.3 kg (144 lb)   SpO2 98%   BMI 23.96 kg/m    Body mass index is 23.96 kg/m .  GENERAL APPEARANCE: Alert, no acute distress  Gait normal.  Affect appropriate.   SKIN: no suspicious lesions or rashes to visualized skin  NEURO: Alert,  oriented x 3, speech and mentation normal      Urine HCG negative.      ASSESSMENT/PLAN:                                                    1. Amenorrhea  Discussed at some length today.  Likely irregularity with the pill is minor and easily explained.  Now she has a gap so q 3 month menses may not be coming any time soon.  No side effects otherwise.  She is going to continue same.    - HCG Qual, Urine - [facility],  [place], [place]  ([id number])    2. Encounter for contraceptive management, unspecified type  As above.            Maximus Kemp MD  [facility]

## 2019-01-21 DIAGNOSIS — Z00.00 ROUTINE GENERAL MEDICAL EXAMINATION AT A HEALTH CARE FACILITY: ICD-10-CM

## 2019-01-21 LAB
ALBUMIN SERPL-MCNC: 4 G/DL (ref 3.4–5)
ALP SERPL-CCNC: 41 U/L (ref 40–150)
ALT SERPL W P-5'-P-CCNC: 24 U/L (ref 0–50)
ANION GAP SERPL CALCULATED.3IONS-SCNC: 5 MMOL/L (ref 3–14)
AST SERPL W P-5'-P-CCNC: 11 U/L (ref 0–45)
BILIRUB SERPL-MCNC: 1.1 MG/DL (ref 0.2–1.3)
BUN SERPL-MCNC: 13 MG/DL (ref 7–30)
CALCIUM SERPL-MCNC: 8.7 MG/DL (ref 8.5–10.1)
CHLORIDE SERPL-SCNC: 108 MMOL/L (ref 94–109)
CHOLEST SERPL-MCNC: 138 MG/DL
CO2 SERPL-SCNC: 26 MMOL/L (ref 20–32)
CREAT SERPL-MCNC: 0.78 MG/DL (ref 0.52–1.04)
GFR SERPL CREATININE-BSD FRML MDRD: >90 ML/MIN/{1.73_M2}
GLUCOSE SERPL-MCNC: 99 MG/DL (ref 70–99)
HDLC SERPL-MCNC: 47 MG/DL
LDLC SERPL CALC-MCNC: 81 MG/DL
NONHDLC SERPL-MCNC: 91 MG/DL
POTASSIUM SERPL-SCNC: 4.3 MMOL/L (ref 3.4–5.3)
PROT SERPL-MCNC: 6.9 G/DL (ref 6.8–8.8)
SODIUM SERPL-SCNC: 139 MMOL/L (ref 133–144)
TRIGL SERPL-MCNC: 51 MG/DL

## 2019-01-21 PROCEDURE — 36415 COLL VENOUS BLD VENIPUNCTURE: CPT | Mod: ZL | Performed by: FAMILY MEDICINE

## 2019-01-21 PROCEDURE — 80053 COMPREHEN METABOLIC PANEL: CPT | Mod: ZL | Performed by: FAMILY MEDICINE

## 2019-01-21 PROCEDURE — 80061 LIPID PANEL: CPT | Mod: ZL | Performed by: FAMILY MEDICINE

## 2019-01-22 ENCOUNTER — OFFICE VISIT (OUTPATIENT)
Dept: FAMILY MEDICINE | Facility: OTHER | Age: 29
End: 2019-01-22
Attending: FAMILY MEDICINE
Payer: MEDICAID

## 2019-01-22 VITALS
HEART RATE: 100 BPM | SYSTOLIC BLOOD PRESSURE: 92 MMHG | DIASTOLIC BLOOD PRESSURE: 62 MMHG | TEMPERATURE: 99.2 F | BODY MASS INDEX: 23.96 KG/M2 | OXYGEN SATURATION: 98 % | WEIGHT: 144 LBS

## 2019-01-22 DIAGNOSIS — N91.2 AMENORRHEA: Primary | ICD-10-CM

## 2019-01-22 DIAGNOSIS — Z30.9 ENCOUNTER FOR CONTRACEPTIVE MANAGEMENT, UNSPECIFIED TYPE: ICD-10-CM

## 2019-01-22 LAB — HCG UR QL: NEGATIVE

## 2019-01-22 PROCEDURE — G0463 HOSPITAL OUTPT CLINIC VISIT: HCPCS

## 2019-01-22 PROCEDURE — 81025 URINE PREGNANCY TEST: CPT | Mod: ZL | Performed by: FAMILY MEDICINE

## 2019-01-22 PROCEDURE — 99213 OFFICE O/P EST LOW 20 MIN: CPT | Performed by: FAMILY MEDICINE

## 2019-01-22 ASSESSMENT — PAIN SCALES - GENERAL: PAINLEVEL: NO PAIN (0)

## 2019-01-22 NOTE — NURSING NOTE
"Chief Complaint   Patient presents with     Abnormal Bleeding Problem       Initial BP 92/62   Pulse 100   Temp 99.2  F (37.3  C) (Tympanic)   Wt 65.3 kg (144 lb)   SpO2 98%   BMI 23.96 kg/m   Estimated body mass index is 23.96 kg/m  as calculated from the following:    Height as of 10/3/18: 1.651 m (5' 5\").    Weight as of this encounter: 65.3 kg (144 lb).  Medication Reconciliation: complete    Alejandrina Chan MA    "

## 2019-01-24 NOTE — PROGRESS NOTES
SUBJECTIVE:   Latasha Fisher is a 28 year old female who presents to clinic today for the following health issu    Wants.  Soft papilloma / skin tag    removal from neck    Duration: many years    Description  Location: right neck has 2 on right neck and one to back neck    Itching: mild    Intensity:  mild    Accompanying signs and symptoms: painful to the touch and itchy, irritating    History (similar episodes/previous evaluation): None    Precipitating or alleviating factors:  New exposures:  None  Recent travel: no      Therapies tried and outcome: excision-had some removed but they grew back.            Problem list and histories reviewed & adjusted, as indicated.  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     Autosomal dominant polycystic kidney disease     Scoliosis (and kyphoscoliosis), idiopathic     Retained complete placenta     Introital dyspareunia     ACP (advance care planning)     Past Surgical History:   Procedure Laterality Date     KNEE SURGERY       LAPAROSCOPIC APPENDECTOMY  4/23/2013    Procedure: LAPAROSCOPIC APPENDECTOMY;  LAPAROSCOPIC APPENDECTOMY;  Surgeon: Paula Roberts MD;  Location: [facility]       Social History     Tobacco Use     Smoking status: Current Every Day Smoker     Packs/day: 0.50     Years: 4.00     Pack years: 2.00     Smokeless tobacco: Never Used     Tobacco comment: Trying to quit   Substance Use Topics     Alcohol use: No     Alcohol/week: 3.0 oz     Types: 6 Cans of beer per week     Comment: occasionally when she goes out' past hx heavier consumption     Family History   Problem Relation Age of  Onset     Thyroid Disease Mother      Diabetes Father      Asthma Sister      Attention Deficit Disorder Brother          Current Outpatient Medications   Medication Sig Dispense Refill     albuterol (VENTOLIN HFA) 108 (90 BASE) MCG/ACT inhaler Inhale 2 puffs into the lungs every 4 hours as needed for shortness of breath / dyspnea or wheezing INHALE TWO PUFFS INTO LUNGS EVERY 4 HOURS AS NEEDED FOR SHORTNESS OF BREATH, SHAKE BEFORE USING 1 Inhaler 0     IBUPROFEN PO Take 600 mg by mouth       ketoconazole (NIZORAL) 2 % external cream Apply topically 2 times daily 30 g 1     levonorgestrel-ethinyl estradiol (SEASONALE) 0.15-0.03 MG tablet Take 1 tablet by mouth daily 91 tablet 3     nicotine (NICODERM CQ) 21 MG/24HR 24 hr patch Place 1 patch onto the skin every 24 hours (Patient not taking: Reported on 1/25/2019) 30 patch 0     nicotine polacrilex ([employer]S NICOTINE POLACRILEX) 4 MG lozenge Place 1 lozenge (4 mg) inside cheek as needed for smoking cessation (Patient not taking: Reported on 1/25/2019) 360 tablet 0     No Known Allergies    Reviewed and updated as needed this visit by clinical staff  Allergies       Reviewed and updated as needed this visit by Provider         Review of Systems   Constitutional: Negative for fever.   Skin:        Has skin tags to right neck  And to mid neck.     /70   Pulse 82   Temp 98.2  F (36.8  C) (Tympanic)   Resp 20   Wt 65.3 kg (144 lb)   SpO2 98%   BMI 23.96 kg/m    Physical Exam   Constitutional: She is oriented to person, place, and time. She appears well-developed and well-nourished. No distress.   Eyes: Conjunctivae and EOM are normal. Pupils are equal, round, and reactive to light.   Neck: Normal range of motion. Neck supple. No thyromegaly present.       Musculoskeletal: Normal range of motion.   Lymphadenopathy:     She has no cervical adenopathy.   Neurological: She is alert and oriented to person, place, and time.   Skin: Skin is warm and dry.   Permit signed.   2 skin tags to right neck washed with betadine, Pulled taut and snipped with sterile curved scissor.  Each base was touched X3 for several seconds with liquid nitrogen to assure any cells would not remain(they had grown back  Lesion at back of neck was washed with betadine and snipped at base.     Psychiatric: She has a normal mood and affect.   ASSESSMENT / PLAN:  (L91.8) Skin tag  (primary encounter diagnosis)  Comment: Removal of skin tags_3 in total. From neck.  Keep clean and dry for 24hours, Do not touch with hands.    Plan: Removal of up to 15 skin tags

## 2019-01-25 ENCOUNTER — OFFICE VISIT (OUTPATIENT)
Dept: INTERNAL MEDICINE | Facility: OTHER | Age: 29
End: 2019-01-25
Attending: NURSE PRACTITIONER
Payer: MEDICAID

## 2019-01-25 VITALS
HEART RATE: 82 BPM | BODY MASS INDEX: 23.96 KG/M2 | OXYGEN SATURATION: 98 % | TEMPERATURE: 98.2 F | WEIGHT: 144 LBS | RESPIRATION RATE: 20 BRPM | SYSTOLIC BLOOD PRESSURE: 100 MMHG | DIASTOLIC BLOOD PRESSURE: 70 MMHG

## 2019-01-25 DIAGNOSIS — L91.8 SKIN TAG: Primary | ICD-10-CM

## 2019-01-25 PROCEDURE — 11200 RMVL SKIN TAGS UP TO&INC 15: CPT | Performed by: NURSE PRACTITIONER

## 2019-01-25 PROCEDURE — G0463 HOSPITAL OUTPT CLINIC VISIT: HCPCS

## 2019-01-25 ASSESSMENT — PAIN SCALES - GENERAL: PAINLEVEL: NO PAIN (0)

## 2019-01-25 ASSESSMENT — ENCOUNTER SYMPTOMS: FEVER: 0

## 2019-01-25 NOTE — NURSING NOTE
"Chief Complaint   Patient presents with     Lesion Removal       Initial /70   Pulse 82   Temp 98.2  F (36.8  C) (Tympanic)   Resp 20   Wt 65.3 kg (144 lb)   SpO2 98%   BMI 23.96 kg/m   Estimated body mass index is 23.96 kg/m  as calculated from the following:    Height as of 10/3/18: 1.651 m (5' 5\").    Weight as of this encounter: 65.3 kg (144 lb).  Medication Reconciliation: complete    Elo Taylor LPN  "

## 2019-03-16 ENCOUNTER — HOSPITAL ENCOUNTER (EMERGENCY)
Facility: HOSPITAL | Age: 29
Discharge: HOME | End: 2019-03-17
Attending: FAMILY MEDICINE | Admitting: FAMILY MEDICINE
Payer: MEDICAID

## 2019-03-16 VITALS — OXYGEN SATURATION: 99 % | HEART RATE: 104 BPM | TEMPERATURE: 97.6 F

## 2019-03-16 DIAGNOSIS — M54.14 THORACIC NERVE ROOT IMPINGEMENT: ICD-10-CM

## 2019-03-16 DIAGNOSIS — S29.012A STRAIN OF THORACIC PARASPINAL MUSCLES EXCLUDING T1 AND T2 LEVELS, INITIAL ENCOUNTER: Primary | ICD-10-CM

## 2019-03-16 PROCEDURE — 99282 EMERGENCY DEPT VISIT SF MDM: CPT

## 2019-03-16 PROCEDURE — 99282 EMERGENCY DEPT VISIT SF MDM: CPT | Mod: Z6 | Performed by: FAMILY MEDICINE

## 2019-03-16 NOTE — ED AVS SNAPSHOT
[facility]  [address] [address]  Phone:  [phone]                                    Latasha Fisher   MRN: [id number]    Department:  [facility]   Date of Visit:  3/16/2019           After Visit Summary Signature Page    I have received my discharge instructions, and my questions have been answered. I have discussed any challenges I see with this plan with the nurse or doctor.    ..........................................................................................................................................  Patient/Patient Representative Signature      ..........................................................................................................................................  Patient Representative Print Name and Relationship to Patient    ..................................................               ................................................  Date                                   Time    ..........................................................................................................................................  Reviewed by Signature/Title    ...................................................              ..............................................  Date                                               Time          22EPIC Rev 08/18

## 2019-03-17 ASSESSMENT — ENCOUNTER SYMPTOMS
CARDIOVASCULAR NEGATIVE: 1
EYES NEGATIVE: 1
CONSTITUTIONAL NEGATIVE: 1
GASTROINTESTINAL NEGATIVE: 1
RESPIRATORY NEGATIVE: 1
MUSCULOSKELETAL NEGATIVE: 1
ENDOCRINE NEGATIVE: 1

## 2019-03-17 NOTE — ED NOTES
Pt to room 3 of the ED accompanied by boyfriend. Pt reports that she has been having left posterior scapula pain since today that is impairing her ability to do certain things like lift her child. Pt gowned. Warm blanket given. Call light in reach.

## 2019-03-17 NOTE — ED PROVIDER NOTES
"  History     Chief Complaint   Patient presents with     Back Pain     left scapula pain since today     HPI  Latasha Fisher is a 28 year old woman who presents reporting that she awoke this morning with stiffness and intermittent \"shooting pain\" below her left scapula. She notes that bending forward too far reproduces the pain which partially radiates from her thoracic spine to a point just below her scapula. She feels that she might have strained a muscle in her sleep or slept in an awkward position because she did not have this pain before she went to sleep last night.    Allergies:  No Known Allergies    Problem List:    Patient Active Problem List    Diagnosis Date Noted     ACP (advance care planning) 09/27/2017     Priority: Medium     Advance Care Planning 9/27/2017: ACP Review of Chart / Resources Provided:  Reviewed chart for advance care plan.  Latasha Fisher has no plan or code status on file. Discussed available resources and provided with information.   Added by Ania Benitez             Introital dyspareunia 07/18/2017     Priority: Medium     Retained complete placenta 01/04/2016     Priority: Medium     Removed manually and currettaged. No excess blood loss noted       Papanicolaou smear of cervix with low grade squamous intraepithelial lesion (LGSIL) 08/25/2015     Priority: Medium     5/15.  [facility].   LGSIL, + HR HPV.  F/u pap pp.       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 06/04/2008     Priority: Medium     Pain in joint, lower leg 05/29/2008     Priority: Medium     Notalgia 08/25/2006     Priority: " Medium     Overview:   IMO Update 10/11       Pes planus 08/25/2006     Priority: Medium     Overview:   IMO Update 10 2016       Scoliosis (and kyphoscoliosis), idiopathic 07/26/2005     Priority: Medium     Overview:   Mild          Past Medical History:    Past Medical History:   Diagnosis Date     Acute pancreatitis child     Hepatitis remote     Scoliosis      Tobacco abuse        Past Surgical History:    Past Surgical History:   Procedure Laterality Date     KNEE SURGERY       LAPAROSCOPIC APPENDECTOMY  4/23/2013    Procedure: LAPAROSCOPIC APPENDECTOMY;  LAPAROSCOPIC APPENDECTOMY;  Surgeon: Paula Roberts MD;  Location: [facility]       Family History:    Family History   Problem Relation Age of Onset     Thyroid Disease Mother      Diabetes Father      Asthma Sister      Attention Deficit Disorder Brother        Social History:  Marital Status:  Single [1]  Social History     Tobacco Use     Smoking status: Current Every Day Smoker     Packs/day: 0.50     Years: 4.00     Pack years: 2.00     Smokeless tobacco: Never Used     Tobacco comment: Trying to quit   Substance Use Topics     Alcohol use: No     Alcohol/week: 3.0 oz     Types: 6 Cans of beer per week     Comment: occasionally when she goes out' past hx heavier consumption     Drug use: No        Medications:      levonorgestrel-ethinyl estradiol (SEASONALE) 0.15-0.03 MG tablet   albuterol (VENTOLIN HFA) 108 (90 BASE) MCG/ACT inhaler         Review of Systems   Constitutional: Negative.    HENT: Negative.    Eyes: Negative.    Respiratory: Negative.    Cardiovascular: Negative.    Gastrointestinal: Negative.    Endocrine: Negative.    Genitourinary: Negative.    Musculoskeletal: Negative.    Skin: Negative.    All other systems reviewed and are negative.      Physical Exam   Pulse: 104  Temp: 97.6  F (36.4  C)  SpO2: 99 %      Physical Exam  General: awake, alert, sitting comfortably    Head: atraumatic.    Ears: no drainage, external ears  normal.    Eyes: no injection or discharge, non-icteric.    Nose: no discharge or congestion.    Mouth/Throat: moist mucous membranes.    Neck: supple, full & painless ROM.    Lungs: no retractions or acute respiratory distress. No tachypnea.    Musculoskeletal/Extremities: full & painless ROM, no lower extremity edema or gross abnormality.    Back: mild pain with palpation of right thoracic paraspinous muscles with reproduction of patient's described radicular pain.    Skin: warm, dry, no cyanosis or rash.    Psych: alert & oriented x 3, fluid, logical thought & speech.      ED Course        Procedures                 No results found for this or any previous visit (from the past 24 hour(s)).    Medications - No data to display    Assessments & Plan (with Medical Decision Making)   The patient is a 28 year old woman who presents with a thoracic back muscle strain with nerve impingement.    1) Thoracic back muscle strain with nerve impingement - patient will be treated with rest, heat application and alternating APAP/ibuprofen with plan for PCP follow-up in 5 - 7 days regarding this ER visit. The patient verbalizes agreement with this plan as well as to immediately return to the ER with any new/worsening S/Sx.          I have reviewed the nursing notes.    I have reviewed the findings, diagnosis, plan and need for follow up with the patient.         Medication List      There are no discharge medications for this visit.         Final diagnoses:   Thoracic nerve root impingement   Strain of thoracic paraspinal muscles excluding T1 and T2 levels, initial encounter       3/16/2019   [facility]     Luke Andersen MD  03/17/19 0050

## 2019-03-27 ENCOUNTER — APPOINTMENT (OUTPATIENT)
Dept: CT IMAGING | Facility: HOSPITAL | Age: 29
End: 2019-03-27
Attending: INTERNAL MEDICINE
Payer: MEDICAID

## 2019-03-27 ENCOUNTER — HOSPITAL ENCOUNTER (EMERGENCY)
Facility: HOSPITAL | Age: 29
Discharge: HOME | End: 2019-03-27
Attending: INTERNAL MEDICINE | Admitting: INTERNAL MEDICINE
Payer: MEDICAID

## 2019-03-27 VITALS
OXYGEN SATURATION: 97 % | SYSTOLIC BLOOD PRESSURE: 120 MMHG | TEMPERATURE: 98.7 F | HEART RATE: 95 BPM | RESPIRATION RATE: 16 BRPM | DIASTOLIC BLOOD PRESSURE: 90 MMHG

## 2019-03-27 DIAGNOSIS — R10.9 FLANK PAIN: ICD-10-CM

## 2019-03-27 LAB
ALBUMIN SERPL-MCNC: 4.1 G/DL (ref 3.4–5)
ALBUMIN UR-MCNC: 30 MG/DL
ALP SERPL-CCNC: 31 U/L (ref 40–150)
ALT SERPL W P-5'-P-CCNC: 21 U/L (ref 0–50)
ANION GAP SERPL CALCULATED.3IONS-SCNC: 5 MMOL/L (ref 3–14)
APPEARANCE UR: CLEAR
AST SERPL W P-5'-P-CCNC: 13 U/L (ref 0–45)
BACTERIA #/AREA URNS HPF: ABNORMAL /HPF
BASOPHILS # BLD AUTO: 0 10E9/L (ref 0–0.2)
BASOPHILS NFR BLD AUTO: 0.5 %
BILIRUB SERPL-MCNC: 1.1 MG/DL (ref 0.2–1.3)
BILIRUB UR QL STRIP: NEGATIVE
BUN SERPL-MCNC: 10 MG/DL (ref 7–30)
CALCIUM SERPL-MCNC: 8.6 MG/DL (ref 8.5–10.1)
CHLORIDE SERPL-SCNC: 109 MMOL/L (ref 94–109)
CO2 SERPL-SCNC: 25 MMOL/L (ref 20–32)
COLOR UR AUTO: YELLOW
CREAT SERPL-MCNC: 0.74 MG/DL (ref 0.52–1.04)
DIFFERENTIAL METHOD BLD: NORMAL
EOSINOPHIL # BLD AUTO: 0.2 10E9/L (ref 0–0.7)
EOSINOPHIL NFR BLD AUTO: 2 %
ERYTHROCYTE [DISTWIDTH] IN BLOOD BY AUTOMATED COUNT: 12 % (ref 10–15)
GFR SERPL CREATININE-BSD FRML MDRD: >90 ML/MIN/{1.73_M2}
GLUCOSE SERPL-MCNC: 102 MG/DL (ref 70–99)
GLUCOSE UR STRIP-MCNC: NEGATIVE MG/DL
HCG UR QL: NEGATIVE
HCT VFR BLD AUTO: 42.1 % (ref 35–47)
HGB BLD-MCNC: 14.4 G/DL (ref 11.7–15.7)
HGB UR QL STRIP: NEGATIVE
IMM GRANULOCYTES # BLD: 0 10E9/L (ref 0–0.4)
IMM GRANULOCYTES NFR BLD: 0.1 %
KETONES UR STRIP-MCNC: NEGATIVE MG/DL
LEUKOCYTE ESTERASE UR QL STRIP: ABNORMAL
LIPASE SERPL-CCNC: 72 U/L (ref 73–393)
LYMPHOCYTES # BLD AUTO: 1.8 10E9/L (ref 0.8–5.3)
LYMPHOCYTES NFR BLD AUTO: 23 %
MCH RBC QN AUTO: 31.1 PG (ref 26.5–33)
MCHC RBC AUTO-ENTMCNC: 34.2 G/DL (ref 31.5–36.5)
MCV RBC AUTO: 91 FL (ref 78–100)
MONOCYTES # BLD AUTO: 0.4 10E9/L (ref 0–1.3)
MONOCYTES NFR BLD AUTO: 4.7 %
MUCOUS THREADS #/AREA URNS LPF: PRESENT /LPF
NEUTROPHILS # BLD AUTO: 5.5 10E9/L (ref 1.6–8.3)
NEUTROPHILS NFR BLD AUTO: 69.7 %
NITRATE UR QL: NEGATIVE
NRBC # BLD AUTO: 0 10*3/UL
NRBC BLD AUTO-RTO: 0 /100
PH UR STRIP: 7 PH (ref 4.7–8)
PLATELET # BLD AUTO: 273 10E9/L (ref 150–450)
POTASSIUM SERPL-SCNC: 3.9 MMOL/L (ref 3.4–5.3)
PROT SERPL-MCNC: 7.1 G/DL (ref 6.8–8.8)
RBC # BLD AUTO: 4.63 10E12/L (ref 3.8–5.2)
RBC #/AREA URNS AUTO: 5 /HPF (ref 0–2)
SODIUM SERPL-SCNC: 139 MMOL/L (ref 133–144)
SOURCE: ABNORMAL
SP GR UR STRIP: 1.03 (ref 1–1.03)
SQUAMOUS #/AREA URNS AUTO: 7 /HPF (ref 0–1)
UROBILINOGEN UR STRIP-MCNC: 2 MG/DL (ref 0–2)
WBC # BLD AUTO: 8 10E9/L (ref 4–11)
WBC #/AREA URNS AUTO: 12 /HPF (ref 0–5)

## 2019-03-27 PROCEDURE — 83690 ASSAY OF LIPASE: CPT | Performed by: INTERNAL MEDICINE

## 2019-03-27 PROCEDURE — 99285 EMERGENCY DEPT VISIT HI MDM: CPT | Mod: Z6 | Performed by: INTERNAL MEDICINE

## 2019-03-27 PROCEDURE — 81025 URINE PREGNANCY TEST: CPT | Performed by: INTERNAL MEDICINE

## 2019-03-27 PROCEDURE — 74176 CT ABD & PELVIS W/O CONTRAST: CPT | Mod: TC

## 2019-03-27 PROCEDURE — 96372 THER/PROPH/DIAG INJ SC/IM: CPT

## 2019-03-27 PROCEDURE — 25000132 ZZH RX MED GY IP 250 OP 250 PS 637: Performed by: INTERNAL MEDICINE

## 2019-03-27 PROCEDURE — 85025 COMPLETE CBC W/AUTO DIFF WBC: CPT | Performed by: INTERNAL MEDICINE

## 2019-03-27 PROCEDURE — 87088 URINE BACTERIA CULTURE: CPT | Performed by: INTERNAL MEDICINE

## 2019-03-27 PROCEDURE — 99285 EMERGENCY DEPT VISIT HI MDM: CPT | Mod: 25

## 2019-03-27 PROCEDURE — 80053 COMPREHEN METABOLIC PANEL: CPT | Performed by: INTERNAL MEDICINE

## 2019-03-27 PROCEDURE — 87086 URINE CULTURE/COLONY COUNT: CPT | Performed by: INTERNAL MEDICINE

## 2019-03-27 PROCEDURE — 36415 COLL VENOUS BLD VENIPUNCTURE: CPT | Performed by: INTERNAL MEDICINE

## 2019-03-27 PROCEDURE — 81001 URINALYSIS AUTO W/SCOPE: CPT | Performed by: INTERNAL MEDICINE

## 2019-03-27 PROCEDURE — 25000128 H RX IP 250 OP 636: Performed by: INTERNAL MEDICINE

## 2019-03-27 RX ORDER — KETOROLAC TROMETHAMINE 30 MG/ML
60 INJECTION, SOLUTION INTRAMUSCULAR; INTRAVENOUS ONCE
Status: COMPLETED | OUTPATIENT
Start: 2019-03-27 | End: 2019-03-27

## 2019-03-27 RX ORDER — ALUMINA, MAGNESIA, AND SIMETHICONE 2400; 2400; 240 MG/30ML; MG/30ML; MG/30ML
30 SUSPENSION ORAL ONCE
Status: COMPLETED | OUTPATIENT
Start: 2019-03-27 | End: 2019-03-27

## 2019-03-27 RX ADMIN — KETOROLAC TROMETHAMINE 60 MG: 60 INJECTION, SOLUTION INTRAMUSCULAR at 06:58

## 2019-03-27 RX ADMIN — ALUMINUM HYDROXIDE, MAGNESIUM HYDROXIDE, AND DIMETHICONE 30 ML: 400; 400; 40 SUSPENSION ORAL at 07:49

## 2019-03-27 ASSESSMENT — ENCOUNTER SYMPTOMS
FLANK PAIN: 0
BLOOD IN STOOL: 0
HEMATURIA: 0
NUMBNESS: 0
VOMITING: 0
CONFUSION: 0
NAUSEA: 0
DIAPHORESIS: 0
ABDOMINAL PAIN: 1
COLOR CHANGE: 0
DYSURIA: 0
SHORTNESS OF BREATH: 0
ARTHRALGIAS: 0
ABDOMINAL DISTENTION: 0
ANAL BLEEDING: 0
MYALGIAS: 0
NECK STIFFNESS: 0
WHEEZING: 0
BACK PAIN: 1
NECK PAIN: 0
VOICE CHANGE: 0
LIGHT-HEADEDNESS: 0
PALPITATIONS: 0
CHEST TIGHTNESS: 0
FEVER: 0
DIZZINESS: 0
CHILLS: 0
HEADACHES: 0
COUGH: 0
WOUND: 0

## 2019-03-27 NOTE — PROGRESS NOTES
SUBJECTIVE:                                                    Latasha Fisher is a 28 year old female who presents to clinic today for the following health issues:      ED/UC Followup:    Facility:  [facility] [place]  Date of visit: 3/16/19 and 3/27/19  Reason for visit: back pain  Current Status: constant pain but laying down at night and sleeping is the worst.         PROBLEMS TO ADD ON...    Problem list and histories reviewed & adjusted, as indicated.  Additional history: pain started during a trip on the 16th.  Went to ER when returned home.  Had pain in med back.  First chiropractic tx helped.  Went back and had another mid back adjustment.  Since has had severe pain.  Can't sleep.      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     Autosomal dominant polycystic kidney disease     Scoliosis (and kyphoscoliosis), idiopathic     Retained complete placenta     Introital dyspareunia     ACP (advance care planning)     Past Surgical History:   Procedure Laterality Date     KNEE SURGERY       LAPAROSCOPIC APPENDECTOMY  4/23/2013    Procedure: LAPAROSCOPIC APPENDECTOMY;  LAPAROSCOPIC APPENDECTOMY;  Surgeon: Paula Roberts MD;  Location: [facility]       Social History     Tobacco Use     Smoking status: Current Every Day Smoker     Packs/day: 0.50     Years: 4.00     Pack years: 2.00     Types: Cigarettes     Start date: 1/1/2006     Smokeless tobacco: Never Used   Substance Use Topics     Alcohol use: No     Alcohol/week: 3.0 oz     Types: 6 Cans of beer per week     Comment: occasionally when she goes out' past hx heavier consumption     Family History   Problem Relation Age of Onset     Thyroid  Disease Mother      Diabetes Father      Asthma Sister      Attention Deficit Disorder Brother          Current Outpatient Medications   Medication Sig Dispense Refill     albuterol (VENTOLIN HFA) 108 (90 BASE) MCG/ACT inhaler Inhale 2 puffs into the lungs every 4 hours as needed for shortness of breath / dyspnea or wheezing INHALE TWO PUFFS INTO LUNGS EVERY 4 HOURS AS NEEDED FOR SHORTNESS OF BREATH, SHAKE BEFORE USING 1 Inhaler 0     IBUPROFEN PO Take 800 mg by mouth every 8 hours as needed for moderate pain       levonorgestrel-ethinyl estradiol (SEASONALE) 0.15-0.03 MG tablet Take 1 tablet by mouth daily 91 tablet 3     methocarbamol (ROBAXIN) 750 MG tablet Take 1 tablet (750 mg) by mouth 4 times daily as needed for muscle spasms 30 tablet 0     predniSONE (DELTASONE) 20 MG tablet Take 20 mg by mouth 2 times daily for 5 days. 10 tablet 0     No Known Allergies    ROS:  Constitutional, HEENT, cardiovascular, pulmonary, gi and gu systems are negative, except as otherwise noted.    OBJECTIVE:                                                    /74   Pulse 99   Temp 99.5  F (37.5  C) (Tympanic)   Wt 66.2 kg (146 lb)   SpO2 99%   BMI 24.30 kg/m    Body mass index is 24.3 kg/m .  GENERAL APPEARANCE: Alert, no acute distress  CV: regular rate and rhythm, no murmur, rub or gallop  RESP: lungs clear to auscultation bilaterally  ABDOMEN: normal bowel sounds, soft, nontender, no hepatosplenomegaly or other masses  SKIN: no suspicious lesions or rashes to visualized skin  NEURO: Alert, oriented x 3, speech and mentation normal  MSK:  Tender spasm left paraspinous around T8-12 or so.      Reviewed all workup.       ASSESSMENT/PLAN:                                                    1. Left flank pain  Discussed.  I think it is MSK.  Lengthy review as she is very anxious.  After discussion, she is way better and thinking msk.  We are going to tx it as such.  We talked about PE at length, and why I think it's probably  not that but I can never be totally sure.  I got the prednisone and robaxin going.  She will call on Monday with ongoing pain.  Any sob, leg swelling, worsening she will be seen promptly.      2. Acute left-sided thoracic back pain  As above.    - predniSONE (DELTASONE) 20 MG tablet; Take 20 mg by mouth 2 times daily for 5 days.  Dispense: 10 tablet; Refill: 0  - methocarbamol (ROBAXIN) 750 MG tablet; Take 1 tablet (750 mg) by mouth 4 times daily as needed for muscle spasms  Dispense: 30 tablet; Refill: 0      25 minutes spent with patient over 50%in counseling about the workup, why I doubt blood clot, and why I think it's MSK.  It was a nice discussion and she left feeling much better I think as she said as much.      Maximus Kemp MD  [facility]

## 2019-03-27 NOTE — ED PROVIDER NOTES
"  History     Chief Complaint   Patient presents with     Back Pain     having \"back contractions\"  R>L since 0300 and then the \"contractions\" are being felt on the bilateral flank area. pt was seen by chiropractor and was told she has scoliosis and Left rib pain     The history is provided by the patient.   Abdominal Pain   Pain location:  L flank  Pain quality: aching, cramping and sharp    Pain radiates to:  LUQ  Pain severity:  Severe  Onset quality:  Gradual  Duration:  2 days  Timing:  Constant  Progression:  Worsening  Chronicity:  New  Associated symptoms: no chest pain, no chills, no cough, no dysuria, no fever, no hematuria, no nausea, no shortness of breath and no vomiting      Latasha Fisher is a 28 year old female who    Allergies:  No Known Allergies    Problem List:    Patient Active Problem List    Diagnosis Date Noted     ACP (advance care planning) 09/27/2017     Priority: Medium     Advance Care Planning 9/27/2017: ACP Review of Chart / Resources Provided:  Reviewed chart for advance care plan.  Latasha Fisher has no plan or code status on file. Discussed available resources and provided with information.   Added by Ania Benitez             Introital dyspareunia 07/18/2017     Priority: Medium     Retained complete placenta 01/04/2016     Priority: Medium     Removed manually and currettaged. No excess blood loss noted       Papanicolaou smear of cervix with low grade squamous intraepithelial lesion (LGSIL) 08/25/2015     Priority: Medium     5/15.  [facility].   LGSIL, + HR HPV.  F/u pap pp.       Polycystic kidney disease 05/27/2015     Priority: Medium     Autosomal dominant polycystic kidney disease 10/03/2013     Priority: Medium     Status post laparoscopic appendectomy 06/10/2013     Priority: Medium     Contraceptive management 07/31/2012     Priority: Medium     Encounter for surgical follow-up care 08/20/2008     Priority: Medium     Overview:   IMO Update 10/11       Old disruption " of anterior cruciate ligament 06/04/2008     Priority: Medium     Pain in joint, lower leg 05/29/2008     Priority: Medium     Notalgia 08/25/2006     Priority: Medium     Overview:   IMO Update 10/11       Pes planus 08/25/2006     Priority: Medium     Overview:   IMO Update 10 2016       Scoliosis (and kyphoscoliosis), idiopathic 07/26/2005     Priority: Medium     Overview:   Mild          Past Medical History:    Past Medical History:   Diagnosis Date     Acute pancreatitis child     Hepatitis remote     Scoliosis      Tobacco abuse        Past Surgical History:    Past Surgical History:   Procedure Laterality Date     KNEE SURGERY       LAPAROSCOPIC APPENDECTOMY  4/23/2013    Procedure: LAPAROSCOPIC APPENDECTOMY;  LAPAROSCOPIC APPENDECTOMY;  Surgeon: Paula Roberts MD;  Location: [facility]       Family History:    Family History   Problem Relation Age of Onset     Thyroid Disease Mother      Diabetes Father      Asthma Sister      Attention Deficit Disorder Brother        Social History:  Marital Status:  Single [1]  Social History     Tobacco Use     Smoking status: Current Every Day Smoker     Packs/day: 0.50     Years: 4.00     Pack years: 2.00     Types: Cigarettes     Start date: 1/1/2006     Smokeless tobacco: Never Used   Substance Use Topics     Alcohol use: No     Alcohol/week: 3.0 oz     Types: 6 Cans of beer per week     Comment: occasionally when she goes out' past hx heavier consumption     Drug use: No        Medications:      levonorgestrel-ethinyl estradiol (SEASONALE) 0.15-0.03 MG tablet   albuterol (VENTOLIN HFA) 108 (90 BASE) MCG/ACT inhaler   IBUPROFEN PO   methocarbamol (ROBAXIN) 750 MG tablet   predniSONE (DELTASONE) 20 MG tablet         Review of Systems   Constitutional: Negative for chills, diaphoresis and fever.   HENT: Negative for voice change.    Eyes: Negative for visual disturbance.   Respiratory: Negative for cough, chest tightness, shortness of breath and wheezing.     Cardiovascular: Negative for chest pain, palpitations and leg swelling.   Gastrointestinal: Positive for abdominal pain. Negative for abdominal distention, anal bleeding, blood in stool, nausea and vomiting.   Genitourinary: Negative for decreased urine volume, dysuria, flank pain and hematuria.   Musculoskeletal: Positive for back pain. Negative for arthralgias, gait problem, myalgias, neck pain and neck stiffness.   Skin: Negative for color change, pallor, rash and wound.   Neurological: Negative for dizziness, syncope, light-headedness, numbness and headaches.   Psychiatric/Behavioral: Negative for confusion and suicidal ideas.       Physical Exam   BP: 130/81  Pulse: 95  Heart Rate: 87  Temp: 98.7  F (37.1  C)  Resp: 16  SpO2: 99 %      Physical Exam   Constitutional: No distress.   HENT:   Head: Atraumatic.   Mouth/Throat: Oropharynx is clear and moist. No oropharyngeal exudate.   Eyes: Pupils are equal, round, and reactive to light. No scleral icterus.   Cardiovascular: Normal heart sounds and intact distal pulses.   Pulmonary/Chest: Breath sounds normal. No respiratory distress.   Abdominal: Soft. Bowel sounds are normal. There is no tenderness.   Musculoskeletal: She exhibits no edema or tenderness.   Skin: Skin is warm. No rash noted. She is not diaphoretic.       ED Course        Procedures            No results found for this or any previous visit (from the past 24 hour(s)).    Medications   ketorolac (TORADOL) injection 60 mg (60 mg Intramuscular Given 3/27/19 0658)   alum & mag hydroxide-simethicone (MYLANTA ES/MAALOX  ES) suspension 30 mL (30 mLs Oral Given 3/27/19 4195)       Assessments & Plan (with Medical Decision Making)   Left flank pain with radiation to mid lower back and LUQ  Symptoms started 2 days ago and getting worse  CT abd; vrad: bilateral non obstructing kidney stones  Pt responded partially to Toradol, Maalox  UA: positive for pyuria  Labs ordered  S/o to Dr Dueñas at the change of  shift  I have reviewed the nursing notes.    I have reviewed the findings, diagnosis, plan and need for follow up with the patient.         Medication List      There are no discharge medications for this visit.         Final diagnoses:   Flank pain       3/27/2019   [facility]     Sy Madrigal MD  03/27/19 0816       Sy Madrigal MD  03/29/19 9433

## 2019-03-27 NOTE — ED NOTES
Noted pt not in room, bathroom or lobby. Unable to contact pt via her lance phone. Dr Dueñas notified.

## 2019-03-27 NOTE — ED NOTES
Face to face report given with opportunity to observe patient.    Report given to nikhil Wakefield   3/27/2019  7:08 AM

## 2019-03-28 ENCOUNTER — OFFICE VISIT (OUTPATIENT)
Dept: FAMILY MEDICINE | Facility: OTHER | Age: 29
End: 2019-03-28
Attending: FAMILY MEDICINE
Payer: MEDICAID

## 2019-03-28 VITALS
DIASTOLIC BLOOD PRESSURE: 74 MMHG | SYSTOLIC BLOOD PRESSURE: 116 MMHG | TEMPERATURE: 99.5 F | WEIGHT: 146 LBS | HEART RATE: 99 BPM | BODY MASS INDEX: 24.3 KG/M2 | OXYGEN SATURATION: 99 %

## 2019-03-28 DIAGNOSIS — R10.9 LEFT FLANK PAIN: ICD-10-CM

## 2019-03-28 DIAGNOSIS — M54.6 ACUTE LEFT-SIDED THORACIC BACK PAIN: Primary | ICD-10-CM

## 2019-03-28 LAB
BACTERIA SPEC CULT: ABNORMAL
SPECIMEN SOURCE: ABNORMAL

## 2019-03-28 PROCEDURE — G0463 HOSPITAL OUTPT CLINIC VISIT: HCPCS

## 2019-03-28 PROCEDURE — 99214 OFFICE O/P EST MOD 30 MIN: CPT | Performed by: FAMILY MEDICINE

## 2019-03-28 RX ORDER — METHOCARBAMOL 750 MG/1
750 TABLET, FILM COATED ORAL 4 TIMES DAILY PRN
Qty: 30 TABLET | Refills: 0 | Status: SHIPPED | OUTPATIENT
Start: 2019-03-28 | End: 2020-12-01

## 2019-03-28 RX ORDER — PREDNISONE 20 MG/1
20 TABLET ORAL 2 TIMES DAILY
Qty: 10 TABLET | Refills: 0 | Status: SHIPPED | OUTPATIENT
Start: 2019-03-28 | End: 2019-04-02

## 2019-03-28 ASSESSMENT — PAIN SCALES - GENERAL: PAINLEVEL: MODERATE PAIN (4)

## 2019-03-28 NOTE — NURSING NOTE
"Chief Complaint   Patient presents with     ER F/U       Initial /74   Pulse 99   Temp 99.5  F (37.5  C) (Tympanic)   Wt 66.2 kg (146 lb)   SpO2 99%   BMI 24.30 kg/m   Estimated body mass index is 24.3 kg/m  as calculated from the following:    Height as of 10/3/18: 1.651 m (5' 5\").    Weight as of this encounter: 66.2 kg (146 lb).  Medication Reconciliation: complete    Alejandrina Chan MA    "

## 2019-03-31 NOTE — ED PROVIDER NOTES
I assumed care at change of shift to follow up on CT results. The patient left the room without waiting for results.      Emma Dueñas MD  03/30/19 1932    Dx: thoracic back pain     Emma Dueñas MD  03/30/19 1933

## 2019-07-19 ENCOUNTER — ALLIED HEALTH/NURSE VISIT (OUTPATIENT)
Dept: ALLERGY | Facility: OTHER | Age: 29
End: 2019-07-19
Attending: FAMILY MEDICINE
Payer: MEDICAID

## 2019-07-19 DIAGNOSIS — Z11.1 SCREENING EXAMINATION FOR PULMONARY TUBERCULOSIS: Primary | ICD-10-CM

## 2019-07-19 PROCEDURE — 86580 TB INTRADERMAL TEST: CPT | Mod: ZL

## 2019-07-19 NOTE — PROGRESS NOTES
The patient is asked the following questions today and these are her answers:    -Have you had a mantoux administered in the past 30 days?    No  -Have you had a previous positive Mantoux.  No  -Have you received BCG in the past.  No  -Have you had a live vaccine  (MMR, Varicella, OPV, Yellow Fever) in the last 6 weeks.  No  -Have you had and active  viral or bacterial infection in the past 6 weeks.  No  -Have you received corticosteroids or immunosuppressive agents in the past 6 weeks.  No  -Have you been diagnosed with HIV?  No  -Do you have a malignancy?  No    Mantoux Questionnaire: answers were all negative.    0.1mL of tuberculin was given in the Pt's right forearm @ 1433. Pt will return to the clinic in 48-72 hours to have it read.     Cheryl Milton

## 2019-07-22 ENCOUNTER — ALLIED HEALTH/NURSE VISIT (OUTPATIENT)
Dept: ALLERGY | Facility: OTHER | Age: 29
End: 2019-07-22
Attending: FAMILY MEDICINE
Payer: MEDICAID

## 2019-07-22 DIAGNOSIS — Z11.1 SCREENING EXAMINATION FOR PULMONARY TUBERCULOSIS: Primary | ICD-10-CM

## 2019-07-22 LAB
PPDINDURATION: 0 MM (ref 0–5)
PPDREDNESS: NORMAL MM

## 2019-10-12 ENCOUNTER — IMMUNIZATION (OUTPATIENT)
Dept: FAMILY MEDICINE | Facility: OTHER | Age: 29
End: 2019-10-12
Attending: FAMILY MEDICINE
Payer: MEDICAID

## 2019-10-12 DIAGNOSIS — Z23 NEED FOR PROPHYLACTIC VACCINATION AND INOCULATION AGAINST INFLUENZA: Primary | ICD-10-CM

## 2019-10-12 PROCEDURE — 90471 IMMUNIZATION ADMIN: CPT

## 2019-10-12 PROCEDURE — 90686 IIV4 VACC NO PRSV 0.5 ML IM: CPT

## 2019-10-12 NOTE — PROGRESS NOTES
Prior to immunization administration, verified patients identity using patient s name and date of birth. Please see Immunization Activity for additional information.     Immunization questionnaire answers were all negative.        Flu shot given by Dayanara Clinton LPN. Patient instructed to remain in clinic for 15 minutes afterwards, and to report any adverse reaction to me immediately.       Screening performed by Dayanara Clinton LPN on 10/12/2019 at 9:52 AM.

## 2020-03-02 ENCOUNTER — HEALTH MAINTENANCE LETTER (OUTPATIENT)
Age: 30
End: 2020-03-02

## 2020-03-03 ENCOUNTER — TELEPHONE (OUTPATIENT)
Dept: FAMILY MEDICINE | Facility: OTHER | Age: 30
End: 2020-03-03

## 2020-03-03 NOTE — TELEPHONE ENCOUNTER
Pt called to speak with Dr. Justo Washington's nurse about her upcoming studies abroad and what immunizations she still needs.  Please call pt to discuss and suggest if she needs to make an appt to come see a Provider \    Latasha Fisher P  [phone]

## 2020-03-03 NOTE — TELEPHONE ENCOUNTER
Spoke with pt, she needs hep a and typhoid vaccines for studying abroad.  She will make an appt with the shot room and appt with Dr Kemp was cancelled.

## 2020-03-22 ENCOUNTER — HOSPITAL ENCOUNTER (EMERGENCY)
Facility: HOSPITAL | Age: 30
Discharge: HOME | End: 2020-03-22
Attending: EMERGENCY MEDICINE | Admitting: EMERGENCY MEDICINE
Payer: MEDICAID

## 2020-03-22 VITALS
HEART RATE: 79 BPM | TEMPERATURE: 98.5 F | DIASTOLIC BLOOD PRESSURE: 74 MMHG | SYSTOLIC BLOOD PRESSURE: 119 MMHG | RESPIRATION RATE: 20 BRPM | OXYGEN SATURATION: 97 %

## 2020-03-22 DIAGNOSIS — Z72.0 TOBACCO ABUSE: ICD-10-CM

## 2020-03-22 DIAGNOSIS — R00.2 PALPITATIONS: Primary | ICD-10-CM

## 2020-03-22 DIAGNOSIS — M25.512 ACUTE PAIN OF LEFT SHOULDER: ICD-10-CM

## 2020-03-22 PROCEDURE — 99283 EMERGENCY DEPT VISIT LOW MDM: CPT

## 2020-03-22 PROCEDURE — 93010 ELECTROCARDIOGRAM REPORT: CPT | Performed by: INTERNAL MEDICINE

## 2020-03-22 PROCEDURE — 93005 ELECTROCARDIOGRAM TRACING: CPT

## 2020-03-22 PROCEDURE — 99283 EMERGENCY DEPT VISIT LOW MDM: CPT | Mod: Z6 | Performed by: EMERGENCY MEDICINE

## 2020-03-22 ASSESSMENT — ENCOUNTER SYMPTOMS
MYALGIAS: 1
DIARRHEA: 0
ABDOMINAL PAIN: 0
WHEEZING: 0
COUGH: 0
SORE THROAT: 0
HEADACHES: 0
PALPITATIONS: 1
CHEST TIGHTNESS: 0
VOMITING: 0
STRIDOR: 0
FEVER: 0
DIZZINESS: 0
SHORTNESS OF BREATH: 0
CHILLS: 0
NAUSEA: 0

## 2020-03-22 NOTE — ED AVS SNAPSHOT
[facility]  [address] [address]  Phone:  [phone]                                    Latasha Fisher   MRN: [id number]    Department:  [facility]   Date of Visit:  3/22/2020           After Visit Summary Signature Page    I have received my discharge instructions, and my questions have been answered. I have discussed any challenges I see with this plan with the nurse or doctor.    ..........................................................................................................................................  Patient/Patient Representative Signature      ..........................................................................................................................................  Patient Representative Print Name and Relationship to Patient    ..................................................               ................................................  Date                                   Time    ..........................................................................................................................................  Reviewed by Signature/Title    ...................................................              ..............................................  Date                                               Time          22EPIC Rev 08/18

## 2020-03-22 NOTE — ED PROVIDER NOTES
"  History     Chief Complaint   Patient presents with     Palpitations     Shoulder Pain     left shoulder     HPI  Latasha Fisher is a 29 year old female who presents to the ED with CC of palpitations, LUE pain, and L-sided \"lung pain\" that she describes as a \"burning\".  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  Debated about whether she should come in all day today.  Denies ST, ear pain, F/C, cough,CP, and SOB.  Pt is a smoker.  Symptoms began last night.  Pt is R-handed and is not sure of any particular activity with her LUE that might have started her shoulder pain.    Allergies:  No Known Allergies    Problem List:    Patient Active Problem List    Diagnosis Date Noted     ACP (advance care planning) 09/27/2017     Priority: Medium     Advance Care Planning 9/27/2017: ACP Review of Chart / Resources Provided:  Reviewed chart for advance care plan.  Latasha Fisher has no plan or code status on file. Discussed available resources and provided with information.   Added by Ania Benitez             Introital dyspareunia 07/18/2017     Priority: Medium     Retained complete placenta 01/04/2016     Priority: Medium     Removed manually and currettaged. No excess blood loss noted       Papanicolaou smear of cervix with low grade squamous intraepithelial lesion (LGSIL) 08/25/2015     Priority: Medium     5/15.  [facility].   LGSIL, + HR HPV.  F/u pap pp.       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 06/04/2008     Priority: Medium     Pain in joint, lower leg 05/29/2008     Priority: Medium     Notalgia " "08/25/2006     Priority: Medium     Overview:   IMO Update 10/11       Pes planus 08/25/2006     Priority: Medium     Overview:   IMO Update 10 2016       Scoliosis (and kyphoscoliosis), idiopathic 07/26/2005     Priority: Medium     Overview:   Mild          Past Medical History:    Past Medical History:   Diagnosis Date     Acute pancreatitis child     Hepatitis remote     Scoliosis      Tobacco abuse        Past Surgical History:    Past Surgical History:   Procedure Laterality Date     KNEE SURGERY       LAPAROSCOPIC APPENDECTOMY  4/23/2013    Procedure: LAPAROSCOPIC APPENDECTOMY;  LAPAROSCOPIC APPENDECTOMY;  Surgeon: Paula Roberts MD;  Location: [facility]       Family History:    Family History   Problem Relation Age of Onset     Thyroid Disease Mother      Diabetes Father      Asthma Sister      Attention Deficit Disorder Brother        Social History:  Marital Status:  Single [1]  Social History     Tobacco Use     Smoking status: Current Every Day Smoker     Packs/day: 0.50     Years: 14.00     Pack years: 7.00     Types: Cigarettes     Start date: 1/1/2006     Smokeless tobacco: Never Used   Substance Use Topics     Alcohol use: No     Alcohol/week: 5.0 standard drinks     Types: 6 Cans of beer per week     Comment: occasionally when she goes out' past hx heavier consumption     Drug use: No        Medications:    levonorgestrel-ethinyl estradiol (SEASONALE) 0.15-0.03 MG tablet  albuterol (VENTOLIN HFA) 108 (90 BASE) MCG/ACT inhaler  IBUPROFEN PO  methocarbamol (ROBAXIN) 750 MG tablet          Review of Systems   Constitutional: Negative for chills and fever.   HENT: Negative for ear pain, postnasal drip and sore throat.    Respiratory: Negative for cough, chest tightness, shortness of breath, wheezing and stridor.           \"burning\" LLL of lung   Cardiovascular: Positive for palpitations. Negative for chest pain and leg swelling.   Gastrointestinal: Negative for abdominal pain, diarrhea, nausea and " vomiting.   Musculoskeletal: Positive for myalgias (top of L shoulder).   Neurological: Negative for dizziness and headaches.   All other systems reviewed and are negative.      Physical Exam   BP: 129/86  Pulse: 95  Heart Rate: 86  Temp: 98.5  F (36.9  C)  Resp: 16  SpO2: 100 %      Physical Exam  Vitals signs and nursing note reviewed.   Constitutional:       Appearance: Normal appearance. She is normal weight.   HENT:      Head: Normocephalic.      Right Ear: Tympanic membrane, ear canal and external ear normal.      Left Ear: Tympanic membrane, ear canal and external ear normal.      Nose: Nose normal.      Mouth/Throat:      Mouth: Mucous membranes are moist.      Pharynx: Oropharynx is clear.   Eyes:      Extraocular Movements: Extraocular movements intact.      Conjunctiva/sclera: Conjunctivae normal.      Pupils: Pupils are equal, round, and reactive to light.   Neck:      Musculoskeletal: Normal range of motion and neck supple.   Cardiovascular:      Rate and Rhythm: Normal rate and regular rhythm.      Pulses: Normal pulses.      Heart sounds: Normal heart sounds.   Pulmonary:      Effort: Pulmonary effort is normal. No respiratory distress.      Breath sounds: Normal breath sounds. No stridor. No wheezing, rhonchi or rales.   Chest:      Chest wall: No tenderness.   Abdominal:      General: Abdomen is flat. Bowel sounds are normal.      Palpations: Abdomen is soft.   Musculoskeletal: Normal range of motion.         General: Tenderness (mild over top of L shoulder) present. No swelling, deformity or signs of injury.   Skin:     General: Skin is warm and dry.      Capillary Refill: Capillary refill takes less than 2 seconds.   Neurological:      General: No focal deficit present.      Mental Status: She is alert and oriented to person, place, and time.   Psychiatric:         Mood and Affect: Mood normal.         Behavior: Behavior normal.       12-lead EKG - NSR at 79bpm with sinus arrhythmia, nl axis, no  acute ST-T changes    ED Course      It is quite apparent that majority of pt's symptoms may be anxiety-related.  There is full ROM of the L shoulder with no increase in pain and no deformity.  Heart is RRR with no murmurs, clicks, or rubs.  Lungs are CTAB with no wheezing or focal area of crackles.  VSS.               Critical Care time:  none               No results found for this or any previous visit (from the past 24 hour(s)).    Medications - No data to display    Assessments & Plan (with Medical Decision Making)     See Discharge Instructions    I have reviewed the nursing notes.    I have reviewed the findings, diagnosis, plan and need for follow up with the patient.       New Prescriptions    No medications on file       Final diagnoses:   Palpitations   Acute pain of left shoulder   Tobacco abuse       3/22/2020   [facility]     Shelly Rosario DO  03/22/20 1536

## 2020-03-22 NOTE — ED NOTES
Pt given AVS and verbalizes understanding of discharge diagnosis.  Aware to use IBU for pain as needed.  Rates shoulder pain 5/10.  VS as charted.  Discharged to home.

## 2020-03-22 NOTE — ED NOTES
Pt to the ED alone with report of left shoulder pain. States this morning woke up with the pain, started having left lung pain and no longer has, and reports she became anxious about thought of coming to the ED. She then started having palpitations which she is no longer having. 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  Monitors placed.  Call light is given.  Assessment is complete.

## 2020-03-22 NOTE — DISCHARGE INSTRUCTIONS
Ibuprofen (Motrin, Advil) 600-800mg 3 times per day with food x 5-7 days.  Rest.  Increase daily water intake to at least 40 oz.  Stop smoking to decrease risk of various illnesses.  Followup at clinic if your shoulder pain is not improving.  Return to the ER for any significant worsening symptoms such as fever of 101F or higher, shortness of breath, or wheezing.

## 2020-06-16 ENCOUNTER — TELEPHONE (OUTPATIENT)
Dept: FAMILY MEDICINE | Facility: OTHER | Age: 30
End: 2020-06-16
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
## 2020-06-16 RX ORDER — DOXYCYCLINE HYCLATE 100 MG
200 TABLET ORAL ONCE
Qty: 2 TABLET | Refills: 0 | Status: SHIPPED | OUTPATIENT
Start: 2020-06-16 | End: 2020-06-16

## 2020-06-16 NOTE — TELEPHONE ENCOUNTER
Patient was bit by a deer tick, attached for more than 36 hours, removed less than 72 hours ago. Will treat with doxycycline 200 mg times one. She was made aware of the side effects of doxycycline.

## 2020-10-06 ENCOUNTER — APPOINTMENT (OUTPATIENT)
Dept: OCCUPATIONAL MEDICINE | Facility: OTHER | Age: 30
End: 2020-10-06

## 2020-10-22 NOTE — PROGRESS NOTES
"Latasha Fisher is a 30 year old female who is being evaluated via a billable telephone visit.      The patient has been notified of following:     \"This telephone visit will be conducted via a call between you and your physician/provider. We have found that certain health care needs can be provided without the need for a physical exam.  This service lets us provide the care you need with a short phone conversation.  If a prescription is necessary we can send it directly to your pharmacy.  If lab work is needed we can place an order for that and you can then stop by our lab to have the test done at a later time.    Telephone visits are billed at different rates depending on your insurance coverage. During this emergency period, for some insurers they may be billed the same as an in-person visit.  Please reach out to your insurance provider with any questions.    If during the course of the call the physician/provider feels a telephone visit is not appropriate, you will not be charged for this service.\"    Patient has given verbal consent for Telephone visit?  Yes    What phone number would you like to be contacted at? [phone]    How would you like to obtain your AVS? Sylwia    Subjective     Latasha Fisher is a 30 year old female who presents via phone visit today for the following health issues: Patient wants to quit smoking. She has smoked 1ppd for 14-15 years. She has lingering cough and is ready to quit.    HPI     Smoking Cessation           Review of Systems   Constitutional, HEENT, cardiovascular, pulmonary, gi and gu systems are negative, except as otherwise noted.       Objective          Vitals:  No vitals were obtained today due to virtual visit.    healthy, alert and no distress  PSYCH: Alert and oriented times 3; coherent speech, normal   rate and volume, able to articulate logical thoughts, able   to abstract reason, no tangential thoughts, no hallucinations   or delusions  Her affect is " normal  RESP: No cough, no audible wheezing, able to talk in full sentences  Remainder of exam unable to be completed due to telephone visits            Assessment/Plan:    Assessment & Plan     (Z71.6) Encounter for tobacco use cessation counseling  (primary encounter diagnosis)  Comment: Patient counseled. Will use Chantix  Plan: varenicline (CHANTIX KIRAN) 0.5 MG X 11 & 1 MG X         42 tablet, varenicline (CHANTIX) 1 MG tablet                 Tobacco Cessation:   reports that she has been smoking cigarettes. She started smoking about 14 years ago. She has a 7.00 pack-year smoking history. She has never used smokeless tobacco.  Discussion with patient regarding the risks associated with tobacco including dependency and health related illnesses including lung cancer.The health benefits of stopping tobacco abuse are discussed. Discussed options available to help patient stop tobacco use including medications and support network. Patient shows good understanding           Follow up as needed        No follow-ups on file.    SOSA Kumar  [facility]    Phone call duration:  12 minutes

## 2020-10-23 ENCOUNTER — VIRTUAL VISIT (OUTPATIENT)
Dept: FAMILY MEDICINE | Facility: OTHER | Age: 30
End: 2020-10-23
Attending: PHYSICIAN ASSISTANT
Payer: MEDICAID

## 2020-10-23 DIAGNOSIS — Z71.6 ENCOUNTER FOR TOBACCO USE CESSATION COUNSELING: Primary | ICD-10-CM

## 2020-10-23 PROCEDURE — 99442 PR PHYSICIAN TELEPHONE EVALUATION 11-20 MIN: CPT | Performed by: PHYSICIAN ASSISTANT

## 2020-10-23 RX ORDER — VARENICLINE TARTRATE 1 MG/1
1 TABLET, FILM COATED ORAL 2 TIMES DAILY
Qty: 60 TABLET | Refills: 2 | Status: SHIPPED | OUTPATIENT
Start: 2020-10-23 | End: 2021-03-29

## 2020-10-23 NOTE — NURSING NOTE
"Chief Complaint   Patient presents with     Smoking Cessation       Initial There were no vitals taken for this visit. Estimated body mass index is 24.3 kg/m  as calculated from the following:    Height as of 10/3/18: 1.651 m (5' 5\").    Weight as of 3/28/19: 66.2 kg (146 lb).  Medication Reconciliation: complete  Emani Crawley LPN  "

## 2020-11-23 ENCOUNTER — NURSE TRIAGE (OUTPATIENT)
Dept: FAMILY MEDICINE | Facility: OTHER | Age: 30
End: 2020-11-23

## 2020-11-23 ENCOUNTER — OFFICE VISIT (OUTPATIENT)
Dept: FAMILY MEDICINE | Facility: OTHER | Age: 30
End: 2020-11-23
Attending: FAMILY MEDICINE
Payer: MEDICAID

## 2020-11-23 DIAGNOSIS — Z20.822 EXPOSURE TO 2019 NOVEL CORONAVIRUS: ICD-10-CM

## 2020-11-23 DIAGNOSIS — Z20.822 EXPOSURE TO 2019 NOVEL CORONAVIRUS: Primary | ICD-10-CM

## 2020-11-23 PROCEDURE — U0003 INFECTIOUS AGENT DETECTION BY NUCLEIC ACID (DNA OR RNA); SEVERE ACUTE RESPIRATORY SYNDROME CORONAVIRUS 2 (SARS-COV-2) (CORONAVIRUS DISEASE [COVID-19]), AMPLIFIED PROBE TECHNIQUE, MAKING USE OF HIGH THROUGHPUT TECHNOLOGIES AS DESCRIBED BY CMS-2020-01-R: HCPCS | Mod: ZL | Performed by: FAMILY MEDICINE

## 2020-11-23 NOTE — TELEPHONE ENCOUNTER
"    Reason for Disposition    [1] COVID-19 EXPOSURE (Close Contact) AND [2] within last 14 days BUT [3] NO symptoms    Additional Information    Negative: COVID-19 has been diagnosed by a healthcare provider (HCP)    Negative: COVID-19 lab test positive    Negative: [1] Symptoms of COVID-19 (e.g., cough, fever, SOB, or others) AND [2] lives in an area with community spread    Negative: [1] Symptoms of COVID-19 (e.g., cough, fever, SOB, or others) AND [2] within 14 days of EXPOSURE (close contact) with diagnosed or suspected COVID-19 patient    Negative: [1] Symptoms of COVID-19 (e.g., cough, fever, SOB, or others) AND [2] within 14 days of travel from high-risk area for COVID-19 community spread (identified by CDC)    Negative: [1] Difficulty breathing (shortness of breath) occurs AND [2] onset > 14 days after COVID-19 EXPOSURE (Close Contact) AND [3] no community spread where patient lives    Negative: [1] Dry cough occurs AND [2] onset > 14 days after COVID-19 EXPOSURE AND [3] no community spread where patient lives    Negative: [1] Wet cough (i.e., white-yellow, yellow, green, or chanda colored sputum) AND [2] onset > 14 days after COVID-19 EXPOSURE AND [3] no community spread where patient lives    Negative: [1] Common cold symptoms AND [2] onset > 14 days after COVID-19 EXPOSURE AND [3] no community spread where patient lives    Negative: [1] COVID-19 EXPOSURE (Close Contact) within last 14 days AND [2] needs COVID-19 lab test to return to work AND [3] NO symptoms    Negative: [1] COVID-19 EXPOSURE (Close Contact) within last 14 days AND [2] exposed person is a healthcare worker who was NOT using all recommended personal protective equipment (i.e., a respirator-N95 mask, eye protection, gloves, and gown) AND [3] NO symptoms    Answer Assessment - Initial Assessment Questions  1. CLOSE CONTACT: \"Who is the person with the confirmed or suspected COVID-19 infection that you were exposed to?\"      dad  2. PLACE of " "CONTACT: \"Where were you when you were exposed to COVID-19?\" (e.g., home, school, medical waiting room; which city?)      At pt's house  3. TYPE of CONTACT: \"How much contact was there?\" (e.g., sitting next to, live in same house, work in same office, same building)      Sat next to  4. DURATION of CONTACT: \"How long were you in contact with the COVID-19 patient?\" (e.g., a few seconds, passed by person, a few minutes, live with the patient)      One hour  5. DATE of CONTACT: \"When did you have contact with a COVID-19 patient?\" (e.g., how many days ago)      11/21  6. TRAVEL: \"Have you traveled out of the country recently?\" If so, \"When and where?\"      * Also ask about out-of-state travel, since the [facility] has identified some high-risk cities for community spread in the .      * Note: Travel becomes less relevant if there is widespread community transmission where the patient lives.      no  7. COMMUNITY SPREAD: \"Are there lots of cases of COVID-19 (community spread) where you live?\" (See public health department website, if unsure)        yes  8. SYMPTOMS: \"Do you have any symptoms?\" (e.g., fever, cough, breathing difficulty)      no  9. PREGNANCY OR POSTPARTUM: \"Is there any chance you are pregnant?\" \"When was your last menstrual period?\" \"Did you deliver in the last 2 weeks?\"      no  10. HIGH RISK: \"Do you have any heart or lung problems? Do you have a weak immune system?\" (e.g., CHF, COPD, asthma, HIV positive, chemotherapy, renal failure, diabetes mellitus, sickle cell anemia)        no    Protocols used: CORONAVIRUS (COVID-19) EXPOSURE-A- 8.4.20      "

## 2020-11-24 LAB
SARS-COV-2 RNA SPEC QL NAA+PROBE: NOT DETECTED
SPECIMEN SOURCE: NORMAL

## 2020-12-01 ENCOUNTER — ANCILLARY PROCEDURE (OUTPATIENT)
Dept: GENERAL RADIOLOGY | Facility: OTHER | Age: 30
End: 2020-12-01
Attending: FAMILY MEDICINE
Payer: MEDICAID

## 2020-12-01 ENCOUNTER — OFFICE VISIT (OUTPATIENT)
Dept: FAMILY MEDICINE | Facility: OTHER | Age: 30
End: 2020-12-01
Attending: FAMILY MEDICINE
Payer: MEDICAID

## 2020-12-01 VITALS
HEART RATE: 94 BPM | SYSTOLIC BLOOD PRESSURE: 98 MMHG | OXYGEN SATURATION: 98 % | WEIGHT: 145 LBS | BODY MASS INDEX: 24.13 KG/M2 | DIASTOLIC BLOOD PRESSURE: 62 MMHG | TEMPERATURE: 98.1 F

## 2020-12-01 DIAGNOSIS — R07.89 FEELING OF CHEST TIGHTNESS: ICD-10-CM

## 2020-12-01 DIAGNOSIS — R09.1 PLEURISY: ICD-10-CM

## 2020-12-01 DIAGNOSIS — R09.1 PLEURISY: Primary | ICD-10-CM

## 2020-12-01 LAB
BASOPHILS # BLD AUTO: 0 10E9/L (ref 0–0.2)
BASOPHILS NFR BLD AUTO: 0.3 %
DIFFERENTIAL METHOD BLD: NORMAL
EOSINOPHIL # BLD AUTO: 0.3 10E9/L (ref 0–0.7)
EOSINOPHIL NFR BLD AUTO: 3.2 %
ERYTHROCYTE [DISTWIDTH] IN BLOOD BY AUTOMATED COUNT: 12.4 % (ref 10–15)
HCT VFR BLD AUTO: 41.9 % (ref 35–47)
HGB BLD-MCNC: 14.2 G/DL (ref 11.7–15.7)
LYMPHOCYTES # BLD AUTO: 2.9 10E9/L (ref 0.8–5.3)
LYMPHOCYTES NFR BLD AUTO: 36.8 %
MCH RBC QN AUTO: 31.1 PG (ref 26.5–33)
MCHC RBC AUTO-ENTMCNC: 33.9 G/DL (ref 31.5–36.5)
MCV RBC AUTO: 92 FL (ref 78–100)
MONOCYTES # BLD AUTO: 0.6 10E9/L (ref 0–1.3)
MONOCYTES NFR BLD AUTO: 7.6 %
NEUTROPHILS # BLD AUTO: 4.1 10E9/L (ref 1.6–8.3)
NEUTROPHILS NFR BLD AUTO: 52.1 %
PLATELET # BLD AUTO: 254 10E9/L (ref 150–450)
RBC # BLD AUTO: 4.56 10E12/L (ref 3.8–5.2)
WBC # BLD AUTO: 7.8 10E9/L (ref 4–11)

## 2020-12-01 PROCEDURE — 71046 X-RAY EXAM CHEST 2 VIEWS: CPT | Mod: TC,FY

## 2020-12-01 PROCEDURE — 93005 ELECTROCARDIOGRAM TRACING: CPT

## 2020-12-01 PROCEDURE — 36415 COLL VENOUS BLD VENIPUNCTURE: CPT | Mod: ZL | Performed by: FAMILY MEDICINE

## 2020-12-01 PROCEDURE — 99214 OFFICE O/P EST MOD 30 MIN: CPT | Mod: 25 | Performed by: FAMILY MEDICINE

## 2020-12-01 PROCEDURE — 85025 COMPLETE CBC W/AUTO DIFF WBC: CPT | Mod: ZL | Performed by: FAMILY MEDICINE

## 2020-12-01 PROCEDURE — G0463 HOSPITAL OUTPT CLINIC VISIT: HCPCS

## 2020-12-01 PROCEDURE — 93010 ELECTROCARDIOGRAM REPORT: CPT | Performed by: INTERNAL MEDICINE

## 2020-12-01 ASSESSMENT — PAIN SCALES - GENERAL: PAINLEVEL: NO PAIN (0)

## 2020-12-01 NOTE — PROGRESS NOTES
Subjective     Latasha Fisher is a 30 year old female who presents to clinic today for the following health issues:    HPI       Chest tightness      Duration: Thursday    Description (location/character/radiation): left side chest    Intensity:  moderate    Accompanying signs and symptoms: SOB, wheezing    History (similar episodes/previous evaluation): None    Precipitating or alleviating factors: None    Therapies tried and outcome: None           Reviewed.  Happened in march as well.  More of a pleurisy type pain.  No rib tenderness.  Just quit smoking.  We worked it up today.  See below.  She has had 2 negative covid tests; one here and one rapid.      Review of Systems   Constitutional, HEENT, cardiovascular, pulmonary, gi and gu systems are negative, except as otherwise noted.      Objective    BP 98/62   Pulse 94   Temp 98.1  F (36.7  C)   Wt 65.8 kg (145 lb)   SpO2 98%   BMI 24.13 kg/m    Body mass index is 24.13 kg/m .  Physical Exam   GENERAL: healthy, alert and no distress  EYES: Eyes grossly normal to inspection, PERRL and conjunctivae and sclerae normal  HENT: ear canals and TM's normal, nose and mouth without ulcers or lesions  NECK: no adenopathy, no asymmetry, masses, or scars and thyroid normal to palpation  RESP: lungs clear to auscultation - no rales, rhonchi or wheezes  CV: regular rate and rhythm, normal S1 S2, no S3 or S4, no murmur, click or rub, no peripheral edema and peripheral pulses strong  ABDOMEN: soft, nontender, no hepatosplenomegaly, no masses and bowel sounds normal  MS: no gross musculoskeletal defects noted, no edema            Assessment & Plan     Pleurisy  Reviewed at length today.  All workup normal including resting EKG, CXR, and cbc.  Oxygen normal.  Exam normal.  Mostly reassured.  Talked about CT chest with ongoing concerns, but that seems aggressive right now.    - CBC with platelets and differential  - XR CHEST 2 VW (Clinic Performed); Future  - EKG 12-lead  complete w/read - (Clinic Performed)    Feeling of chest tightness  As above.  No red flags.  She is going to continue to quit smoking, and f/u with ongoing concerns.  I was reassured for sure with the workup today and I expressed this to her.  I reviewed all the studies with her in detail.     - CBC with platelets and differential  - XR CHEST 2 VW (Clinic Performed); Future  - EKG 12-lead complete w/read - (Clinic Performed)     Tobacco Cessation:   reports that she has been smoking cigarettes. She started smoking about 14 years ago. She has a 7.00 pack-year smoking history. She has never used smokeless tobacco.               No follow-ups on file.    Maximus Kemp MD  [facility]

## 2020-12-01 NOTE — NURSING NOTE
"Chief Complaint   Patient presents with     URI       Initial BP 98/62   Pulse 94   Temp 98.1  F (36.7  C)   Wt 65.8 kg (145 lb)   SpO2 98%   BMI 24.13 kg/m   Estimated body mass index is 24.13 kg/m  as calculated from the following:    Height as of 10/3/18: 1.651 m (5' 5\").    Weight as of this encounter: 65.8 kg (145 lb).  Medication Reconciliation: complete  Padmini Mejía LPN  "

## 2020-12-14 ENCOUNTER — HEALTH MAINTENANCE LETTER (OUTPATIENT)
Age: 30
End: 2020-12-14

## 2021-02-25 ENCOUNTER — OFFICE VISIT (OUTPATIENT)
Dept: FAMILY MEDICINE | Facility: OTHER | Age: 31
End: 2021-02-25
Attending: FAMILY MEDICINE
Payer: MEDICAID

## 2021-02-25 VITALS
BODY MASS INDEX: 25.49 KG/M2 | HEART RATE: 65 BPM | HEIGHT: 65 IN | WEIGHT: 153 LBS | DIASTOLIC BLOOD PRESSURE: 70 MMHG | SYSTOLIC BLOOD PRESSURE: 104 MMHG | OXYGEN SATURATION: 99 %

## 2021-02-25 DIAGNOSIS — R14.0 BLOATING: ICD-10-CM

## 2021-02-25 DIAGNOSIS — Z13.220 LIPID SCREENING: ICD-10-CM

## 2021-02-25 DIAGNOSIS — Z00.00 ROUTINE GENERAL MEDICAL EXAMINATION AT A HEALTH CARE FACILITY: Primary | ICD-10-CM

## 2021-02-25 DIAGNOSIS — Z11.59 NEED FOR HEPATITIS C SCREENING TEST: ICD-10-CM

## 2021-02-25 LAB
ALBUMIN SERPL-MCNC: 4.3 G/DL (ref 3.4–5)
ALP SERPL-CCNC: 45 U/L (ref 40–150)
ALT SERPL W P-5'-P-CCNC: 56 U/L (ref 0–50)
ANION GAP SERPL CALCULATED.3IONS-SCNC: 7 MMOL/L (ref 3–14)
AST SERPL W P-5'-P-CCNC: 27 U/L (ref 0–45)
BILIRUB SERPL-MCNC: 2 MG/DL (ref 0.2–1.3)
BUN SERPL-MCNC: 10 MG/DL (ref 7–30)
CALCIUM SERPL-MCNC: 8.8 MG/DL (ref 8.5–10.1)
CHLORIDE SERPL-SCNC: 106 MMOL/L (ref 94–109)
CHOLEST SERPL-MCNC: 164 MG/DL
CO2 SERPL-SCNC: 24 MMOL/L (ref 20–32)
CREAT SERPL-MCNC: 0.66 MG/DL (ref 0.52–1.04)
GFR SERPL CREATININE-BSD FRML MDRD: >90 ML/MIN/{1.73_M2}
GLUCOSE SERPL-MCNC: 81 MG/DL (ref 70–99)
HDLC SERPL-MCNC: 52 MG/DL
LDLC SERPL CALC-MCNC: 96 MG/DL
NONHDLC SERPL-MCNC: 112 MG/DL
POTASSIUM SERPL-SCNC: 3.7 MMOL/L (ref 3.4–5.3)
PROT SERPL-MCNC: 7.3 G/DL (ref 6.8–8.8)
SODIUM SERPL-SCNC: 137 MMOL/L (ref 133–144)
TRIGL SERPL-MCNC: 81 MG/DL

## 2021-02-25 PROCEDURE — 80053 COMPREHEN METABOLIC PANEL: CPT | Mod: ZL | Performed by: FAMILY MEDICINE

## 2021-02-25 PROCEDURE — G0463 HOSPITAL OUTPT CLINIC VISIT: HCPCS

## 2021-02-25 PROCEDURE — 83516 IMMUNOASSAY NONANTIBODY: CPT | Mod: ZL,59 | Performed by: FAMILY MEDICINE

## 2021-02-25 PROCEDURE — 83516 IMMUNOASSAY NONANTIBODY: CPT | Mod: ZL | Performed by: FAMILY MEDICINE

## 2021-02-25 PROCEDURE — 86803 HEPATITIS C AB TEST: CPT | Mod: ZL | Performed by: FAMILY MEDICINE

## 2021-02-25 PROCEDURE — 80061 LIPID PANEL: CPT | Mod: ZL | Performed by: FAMILY MEDICINE

## 2021-02-25 PROCEDURE — 36415 COLL VENOUS BLD VENIPUNCTURE: CPT | Mod: ZL | Performed by: FAMILY MEDICINE

## 2021-02-25 PROCEDURE — 99395 PREV VISIT EST AGE 18-39: CPT | Performed by: FAMILY MEDICINE

## 2021-02-25 ASSESSMENT — PAIN SCALES - GENERAL: PAINLEVEL: NO PAIN (0)

## 2021-02-25 ASSESSMENT — PATIENT HEALTH QUESTIONNAIRE - PHQ9: SUM OF ALL RESPONSES TO PHQ QUESTIONS 1-9: 2

## 2021-02-25 ASSESSMENT — MIFFLIN-ST. JEOR: SCORE: 1414.88

## 2021-02-25 NOTE — PROGRESS NOTES
SUBJECTIVE:   CC: Latasha Fisher is an 30 year old woman who presents for preventive health visit.       Patient has been advised of split billing requirements and indicates understanding: Yes  Healthy Habits:    Do you get at least three servings of calcium containing foods daily (dairy, green leafy vegetables, etc.)? no, taking calcium and/or vitamin D supplement: no    Amount of exercise or daily activities, outside of work: none    Problems taking medications regularly No    Medication side effects: No    Have you had an eye exam in the past two years? no    Do you see a dentist twice per year? yes    Do you have sleep apnea, excessive snoring or daytime drowsiness?no          Today's PHQ-2 Score:   PHQ-2 (  [employer]) 10/23/2020 3/28/2019   Q1: Little interest or pleasure in doing things 0 0   Q2: Feeling down, depressed or hopeless 0 0   PHQ-2 Score 0 0       Abuse: Current or Past(Physical, Sexual or Emotional)- No  Do you feel safe in your environment? Yes        Social History     Tobacco Use     Smoking status: Former Smoker     Packs/day: 0.50     Years: 14.00     Pack years: 7.00     Types: Cigarettes     Start date: 2006     Quit date: 2020     Years since quittin.1     Smokeless tobacco: Never Used   Substance Use Topics     Alcohol use: No     Alcohol/week: 5.0 standard drinks     Types: 6 Cans of beer per week     Comment: occasionally when she goes out' past hx heavier consumption     If you drink alcohol do you typically have >3 drinks per day or >7 drinks per week? No                     Reviewed orders with patient.  Reviewed health maintenance and updated orders accordingly - Yes  Lab work is in process    Breast CA Risk Screening:  No flowsheet data found.      Patient under 40 years of age: Routine Mammogram Screening not recommended.   Pertinent mammograms are reviewed under the imaging tab.    Pertinent mammograms are reviewed under the imaging tab.  History of abnormal  "Pap smear: NO - age 30-65 PAP every 5 years with negative HPV co-testing recommended  PAP / HPV 12/12/2018 3/4/2016   PAP NIL NIL     Reviewed and updated as needed this visit by clinical staff  Tobacco  Allergies  Meds              Reviewed and updated as needed this visit by Provider                Past Medical History:   Diagnosis Date     Acute pancreatitis child    post treuma, from a fall     Hepatitis remote    transient, probably from alcohol     Scoliosis      Tobacco abuse       Past Surgical History:   Procedure Laterality Date     KNEE SURGERY       LAPAROSCOPIC APPENDECTOMY  4/23/2013    Procedure: LAPAROSCOPIC APPENDECTOMY;  LAPAROSCOPIC APPENDECTOMY;  Surgeon: Paula Roberts MD;  Location: [facility]       ROS:  CONSTITUTIONAL: NEGATIVE for fever, chills, change in weight  INTEGUMENTARU/SKIN: NEGATIVE for worrisome rashes, moles or lesions  EYES: NEGATIVE for vision changes or irritation  ENT: NEGATIVE for ear, mouth and throat problems  RESP: NEGATIVE for significant cough or SOB  BREAST: NEGATIVE for masses, tenderness or discharge  CV: NEGATIVE for chest pain, palpitations or peripheral edema  GI: NEGATIVE for nausea, abdominal pain, heartburn, or change in bowel habits  : NEGATIVE for unusual urinary or vaginal symptoms. Periods are regular.  MUSCULOSKELETAL: NEGATIVE for significant arthralgias or myalgia  NEURO: NEGATIVE for weakness, dizziness or paresthesias  PSYCHIATRIC: NEGATIVE for changes in mood or affect    OBJECTIVE:   /70   Pulse 65   Ht 1.651 m (5' 5\")   Wt 69.4 kg (153 lb)   SpO2 99%   BMI 25.46 kg/m    EXAM:  GENERAL: healthy, alert and no distress  EYES: Eyes grossly normal to inspection, PERRL and conjunctivae and sclerae normal  HENT: ear canals and TM's normal, nose and mouth without ulcers or lesions  NECK: no adenopathy, no asymmetry, masses, or scars and thyroid normal to palpation  RESP: lungs clear to auscultation - no rales, rhonchi or wheezes  CV: regular " "rate and rhythm, normal S1 S2, no S3 or S4, no murmur, click or rub, no peripheral edema and peripheral pulses strong  ABDOMEN: soft, nontender, no hepatosplenomegaly, no masses and bowel sounds normal  MS: no gross musculoskeletal defects noted, no edema  SKIN: no suspicious lesions or rashes  NEURO: Normal strength and tone, mentation intact and speech normal  PSYCH: mentation appears normal, affect normal/bright    Diagnostic Test Results:  Labs reviewed in Epic    ASSESSMENT/PLAN:       ICD-10-CM    1. Routine general medical examination at a health care facility  Z00.00    2. Need for hepatitis C screening test  Z11.59 Hepatitis C Screen Reflex to HCV RNA Quant and Genotype   3. Lipid screening  Z13.220 Comprehensive metabolic panel     Lipid Profile   4. Bloating  R14.0 Tissue transglutaminase andrew IgA and IgG       Patient has been advised of split billing requirements and indicates understanding:   COUNSELING:   Reviewed preventive health counseling, as reflected in patient instructions    Estimated body mass index is 25.46 kg/m  as calculated from the following:    Height as of this encounter: 1.651 m (5' 5\").    Weight as of this encounter: 69.4 kg (153 lb).        She reports that she quit smoking about 2 months ago. Her smoking use included cigarettes. She started smoking about 15 years ago. She has a 7.00 pack-year smoking history. She has never used smokeless tobacco.  Tobacco Cessation Action Plan:         Counseling Resources:  ATP IV Guidelines  Pooled Cohorts Equation Calculator  Breast Cancer Risk Calculator  BRCA-Related Cancer Risk Assessment: FHS-7 Tool  FRAX Risk Assessment  ICSI Preventive Guidelines  Dietary Guidelines for Americans, 2010  USDA's MyPlate  ASA Prophylaxis  Lung CA Screening    Maximus Kemp MD  [facility]  "

## 2021-02-25 NOTE — NURSING NOTE
"Chief Complaint   Patient presents with     Physical       Initial /70   Pulse 65   Ht 1.651 m (5' 5\")   Wt 69.4 kg (153 lb)   SpO2 99%   BMI 25.46 kg/m   Estimated body mass index is 25.46 kg/m  as calculated from the following:    Height as of this encounter: 1.651 m (5' 5\").    Weight as of this encounter: 69.4 kg (153 lb).  Medication Reconciliation: complete  Padmini Mejía LPN  "

## 2021-02-27 LAB — HCV AB SERPL QL IA: NONREACTIVE

## 2021-03-01 DIAGNOSIS — R79.89 ELEVATED LIVER FUNCTION TESTS: Primary | ICD-10-CM

## 2021-03-01 LAB
TTG IGA SER-ACNC: <1 U/ML
TTG IGG SER-ACNC: <1 U/ML

## 2021-03-19 ENCOUNTER — OFFICE VISIT (OUTPATIENT)
Dept: FAMILY MEDICINE | Facility: OTHER | Age: 31
End: 2021-03-19
Attending: FAMILY MEDICINE
Payer: MEDICAID

## 2021-03-19 DIAGNOSIS — Z20.822 EXPOSURE TO 2019 NOVEL CORONAVIRUS: Primary | ICD-10-CM

## 2021-03-19 PROCEDURE — 87635 SARS-COV-2 COVID-19 AMP PRB: CPT | Mod: ZL | Performed by: FAMILY MEDICINE

## 2021-03-20 LAB
LABORATORY COMMENT REPORT: NORMAL
SARS-COV-2 RNA RESP QL NAA+PROBE: NEGATIVE
SARS-COV-2 RNA RESP QL NAA+PROBE: NORMAL
SPECIMEN SOURCE: NORMAL
SPECIMEN SOURCE: NORMAL

## 2021-03-24 ENCOUNTER — MEDICAL CORRESPONDENCE (OUTPATIENT)
Dept: ULTRASOUND IMAGING | Facility: HOSPITAL | Age: 31
End: 2021-03-24

## 2021-03-29 ENCOUNTER — HOSPITAL ENCOUNTER (EMERGENCY)
Facility: HOSPITAL | Age: 31
Discharge: HOME | End: 2021-03-29
Attending: PHYSICIAN ASSISTANT | Admitting: PHYSICIAN ASSISTANT
Payer: MEDICAID

## 2021-03-29 ENCOUNTER — APPOINTMENT (OUTPATIENT)
Dept: GENERAL RADIOLOGY | Facility: HOSPITAL | Age: 31
End: 2021-03-29
Attending: PHYSICIAN ASSISTANT
Payer: MEDICAID

## 2021-03-29 VITALS
SYSTOLIC BLOOD PRESSURE: 122 MMHG | DIASTOLIC BLOOD PRESSURE: 84 MMHG | OXYGEN SATURATION: 99 % | RESPIRATION RATE: 16 BRPM | TEMPERATURE: 97.2 F | HEART RATE: 88 BPM

## 2021-03-29 DIAGNOSIS — R10.13 ABDOMINAL PAIN, EPIGASTRIC: ICD-10-CM

## 2021-03-29 LAB
ALBUMIN SERPL-MCNC: 3.8 G/DL (ref 3.4–5)
ALBUMIN UR-MCNC: NEGATIVE MG/DL
ALP SERPL-CCNC: 44 U/L (ref 40–150)
ALT SERPL W P-5'-P-CCNC: 29 U/L (ref 0–50)
ANION GAP SERPL CALCULATED.3IONS-SCNC: 6 MMOL/L (ref 3–14)
APPEARANCE UR: CLEAR
AST SERPL W P-5'-P-CCNC: 12 U/L (ref 0–45)
BASOPHILS # BLD AUTO: 0 10E9/L (ref 0–0.2)
BASOPHILS NFR BLD AUTO: 0.5 %
BILIRUB SERPL-MCNC: 0.9 MG/DL (ref 0.2–1.3)
BILIRUB UR QL STRIP: NEGATIVE
BUN SERPL-MCNC: 8 MG/DL (ref 7–30)
CALCIUM SERPL-MCNC: 8.7 MG/DL (ref 8.5–10.1)
CHLORIDE SERPL-SCNC: 107 MMOL/L (ref 94–109)
CO2 SERPL-SCNC: 25 MMOL/L (ref 20–32)
COLOR UR AUTO: NORMAL
CREAT SERPL-MCNC: 0.7 MG/DL (ref 0.52–1.04)
DIFFERENTIAL METHOD BLD: NORMAL
EOSINOPHIL # BLD AUTO: 0.3 10E9/L (ref 0–0.7)
EOSINOPHIL NFR BLD AUTO: 3.2 %
ERYTHROCYTE [DISTWIDTH] IN BLOOD BY AUTOMATED COUNT: 12.1 % (ref 10–15)
GFR SERPL CREATININE-BSD FRML MDRD: >90 ML/MIN/{1.73_M2}
GLUCOSE SERPL-MCNC: 82 MG/DL (ref 70–99)
GLUCOSE UR STRIP-MCNC: NEGATIVE MG/DL
HCG UR QL: NEGATIVE
HCT VFR BLD AUTO: 40.8 % (ref 35–47)
HGB BLD-MCNC: 13.6 G/DL (ref 11.7–15.7)
HGB UR QL STRIP: NEGATIVE
IMM GRANULOCYTES # BLD: 0 10E9/L (ref 0–0.4)
IMM GRANULOCYTES NFR BLD: 0.1 %
KETONES UR STRIP-MCNC: NEGATIVE MG/DL
LEUKOCYTE ESTERASE UR QL STRIP: NEGATIVE
LIPASE SERPL-CCNC: 69 U/L (ref 73–393)
LYMPHOCYTES # BLD AUTO: 2.3 10E9/L (ref 0.8–5.3)
LYMPHOCYTES NFR BLD AUTO: 26.5 %
MCH RBC QN AUTO: 30.9 PG (ref 26.5–33)
MCHC RBC AUTO-ENTMCNC: 33.3 G/DL (ref 31.5–36.5)
MCV RBC AUTO: 93 FL (ref 78–100)
MONOCYTES # BLD AUTO: 0.6 10E9/L (ref 0–1.3)
MONOCYTES NFR BLD AUTO: 6.9 %
NEUTROPHILS # BLD AUTO: 5.4 10E9/L (ref 1.6–8.3)
NEUTROPHILS NFR BLD AUTO: 62.8 %
NITRATE UR QL: NEGATIVE
NRBC # BLD AUTO: 0 10*3/UL
NRBC BLD AUTO-RTO: 0 /100
PH UR STRIP: 7 PH (ref 4.7–8)
PLATELET # BLD AUTO: 238 10E9/L (ref 150–450)
POTASSIUM SERPL-SCNC: 3.6 MMOL/L (ref 3.4–5.3)
PROT SERPL-MCNC: 6.9 G/DL (ref 6.8–8.8)
RBC # BLD AUTO: 4.4 10E12/L (ref 3.8–5.2)
SODIUM SERPL-SCNC: 138 MMOL/L (ref 133–144)
SOURCE: NORMAL
SP GR UR STRIP: 1 (ref 1–1.03)
UROBILINOGEN UR STRIP-MCNC: NORMAL MG/DL (ref 0–2)
WBC # BLD AUTO: 8.5 10E9/L (ref 4–11)

## 2021-03-29 PROCEDURE — 74019 RADEX ABDOMEN 2 VIEWS: CPT

## 2021-03-29 PROCEDURE — 99284 EMERGENCY DEPT VISIT MOD MDM: CPT

## 2021-03-29 PROCEDURE — 81025 URINE PREGNANCY TEST: CPT | Performed by: PHYSICIAN ASSISTANT

## 2021-03-29 PROCEDURE — 36415 COLL VENOUS BLD VENIPUNCTURE: CPT | Performed by: PHYSICIAN ASSISTANT

## 2021-03-29 PROCEDURE — 99284 EMERGENCY DEPT VISIT MOD MDM: CPT | Performed by: PHYSICIAN ASSISTANT

## 2021-03-29 PROCEDURE — 80053 COMPREHEN METABOLIC PANEL: CPT | Performed by: PHYSICIAN ASSISTANT

## 2021-03-29 PROCEDURE — 81003 URINALYSIS AUTO W/O SCOPE: CPT | Performed by: PHYSICIAN ASSISTANT

## 2021-03-29 PROCEDURE — 85025 COMPLETE CBC W/AUTO DIFF WBC: CPT | Performed by: PHYSICIAN ASSISTANT

## 2021-03-29 PROCEDURE — 83690 ASSAY OF LIPASE: CPT | Performed by: PHYSICIAN ASSISTANT

## 2021-03-29 RX ORDER — SUCRALFATE 1 G/1
1 TABLET ORAL 4 TIMES DAILY
Qty: 40 TABLET | Refills: 0 | Status: SHIPPED | OUTPATIENT
Start: 2021-03-29 | End: 2021-04-29

## 2021-03-29 RX ORDER — PANTOPRAZOLE SODIUM 40 MG/1
40 TABLET, DELAYED RELEASE ORAL DAILY
Qty: 30 TABLET | Refills: 0 | Status: SHIPPED | OUTPATIENT
Start: 2021-03-29 | End: 2021-04-29

## 2021-03-29 NOTE — ED TRIAGE NOTES
Pt c/o left epigastric area pain that has become worse. States since she quit smoking in December she has had a lot of GI issues.

## 2021-03-30 NOTE — DISCHARGE INSTRUCTIONS
Protonix as directed.  Carafate as directed.  Pepcid 20 mg twice daily for 1 week as well.  Pepcid can be bought over-the-counter.  May use MiraLAX as discussed for stool.  Follow-up in clinic if not improving over time they may have to do an endoscopy.

## 2021-03-30 NOTE — ED PROVIDER NOTES
History     Chief Complaint   Patient presents with     Abdominal Pain     HPI  Latasha Fisher is a 30 year old female who presents here with left upper abdominal pain epigastric pain.  She has noticed that since she has quit smoking the pain is gotten worse.  In December she had a lot of GI issues.  She has a nausea no vomiting.  She does have history of constipation.  Does not believe she is pregnant.  No vaginal discharge or bleeding.  No fever chills cough chest pain or shortness of breath.  No headache lightheadedness or sore throat.  She has not noticed any association with food.  She has not tried anything for her symptoms.  Again since she quit smoking she has had GI issues.  She did take Chantix she is no longer on Chantix now.    Allergies:  No Known Allergies    Problem List:    Patient Active Problem List    Diagnosis Date Noted     ACP (advance care planning) 09/27/2017     Priority: Medium     Advance Care Planning 9/27/2017: ACP Review of Chart / Resources Provided:  Reviewed chart for advance care plan.  Latasha Fisher has no plan or code status on file. Discussed available resources and provided with information.   Added by Ania Benitez             Introital dyspareunia 07/18/2017     Priority: Medium     Retained complete placenta 01/04/2016     Priority: Medium     Removed manually and currettaged. No excess blood loss noted       Papanicolaou smear of cervix with low grade squamous intraepithelial lesion (LGSIL) 08/25/2015     Priority: Medium     5/15.  [facility].   LGSIL, + HR HPV.  F/u pap pp.       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     Priority: Medium     Pain in joint, lower leg 2008     Priority: Medium     Notalgia 2006     Priority: Medium     Overview:   IMO Update 10/11       Pes planus 2006     Priority: Medium     Overview:   IMO Update 10 2016       Scoliosis (and kyphoscoliosis), idiopathic 2005     Priority: Medium     Overview:   Mild          Past Medical History:    Past Medical History:   Diagnosis Date     Acute pancreatitis child     Hepatitis remote     Scoliosis      Tobacco abuse        Past Surgical History:    Past Surgical History:   Procedure Laterality Date     KNEE SURGERY       LAPAROSCOPIC APPENDECTOMY  2013    Procedure: LAPAROSCOPIC APPENDECTOMY;  LAPAROSCOPIC APPENDECTOMY;  Surgeon: Paula Roberts MD;  Location: [facility]       Family History:    Family History   Problem Relation Age of Onset     Thyroid Disease Mother      Diabetes Father      Asthma Sister      Attention Deficit Disorder Brother        Social History:  Marital Status:  Single [1]  Social History     Tobacco Use     Smoking status: Former Smoker     Packs/day: 0.50     Years: 14.00     Pack years: 7.00     Types: Cigarettes     Start date: 2006     Quit date: 2020     Years since quittin.2     Smokeless tobacco: Never Used   Substance Use Topics     Alcohol use: No     Alcohol/week: 5.0 standard drinks     Types: 6 Cans of beer per week     Comment: occasionally when she goes out' past hx heavier consumption     Drug use: No        Medications:    pantoprazole (PROTONIX) 40 MG EC tablet  sucralfate (CARAFATE) 1 GM tablet          Review of Systems  I did do a complete 10 point review of systems. Pertinent positives and negatives listed per HPI. All other systems have been reviewed and are negative.      Physical Exam   BP: 122/84  Pulse: 91  Temp: 97.2  F (36.2  C)  Resp: 16  SpO2: 99 %      Physical Exam  Vitals signs and nursing note reviewed.    Constitutional:       General: She is not in acute distress.     Appearance: Normal appearance. She is not ill-appearing, toxic-appearing or diaphoretic.   HENT:      Head: Normocephalic and atraumatic.   Eyes:      General: No scleral icterus.     Conjunctiva/sclera: Conjunctivae normal.   Neck:      Musculoskeletal: Neck supple.   Cardiovascular:      Rate and Rhythm: Normal rate.   Pulmonary:      Effort: Pulmonary effort is normal. No respiratory distress.      Breath sounds: Normal breath sounds.   Abdominal:      General: Abdomen is flat. Bowel sounds are normal.      Palpations: Abdomen is soft.      Tenderness: There is abdominal tenderness. There is no guarding or rebound.      Comments: Minimal tenderness in the epigastric region as well as left upper quadrant no tenderness in the right upper quadrant.  No tenderness in the lower quadrants.   Musculoskeletal:         General: No swelling or signs of injury.   Skin:     General: Skin is warm and dry.   Neurological:      General: No focal deficit present.      Mental Status: She is alert and oriented to person, place, and time.   Psychiatric:         Mood and Affect: Mood normal.         Behavior: Behavior normal.         Thought Content: Thought content normal.         Judgment: Judgment normal.         ED Course   Abdomen exam is very benign.  X-ray shows a moderate to large amount of stool mostly left-sided so I recommend doing possibly MiraLAX bowel prep if she wants to get it done and over with.  Being her left upper abdomen and epigastric area consider things of gastritis possible ulcers.  Possible duodenitis.  We will go ahead started on proton pump inhibitor as well as Carafate have her take Pepcid twice daily for 1 week.  Follow-up in clinic.  ED Course as of Mar 29 2137   Mon Mar 29, 2021   1903 SpO2: 99 %         Results for orders placed or performed during the hospital encounter of 03/29/21 (from the past 24 hour(s))   HCG qualitative urine    Result Value Ref Range    HCG Qual Urine Negative NEG^Negative   UA reflex to Microscopic and Culture    Specimen: Midstream Urine   Result Value Ref Range    Color Urine Straw     Appearance Urine Clear     Glucose Urine Negative NEG^Negative mg/dL    Bilirubin Urine Negative NEG^Negative    Ketones Urine Negative NEG^Negative mg/dL    Specific Gravity Urine 1.005 1.003 - 1.035    Blood Urine Negative NEG^Negative    pH Urine 7.0 4.7 - 8.0 pH    Protein Albumin Urine Negative NEG^Negative mg/dL    Urobilinogen mg/dL Normal 0.0 - 2.0 mg/dL    Nitrite Urine Negative NEG^Negative    Leukocyte Esterase Urine Negative NEG^Negative    Source Midstream Urine    CBC with platelets differential   Result Value Ref Range    WBC 8.5 4.0 - 11.0 10e9/L    RBC Count 4.40 3.8 - 5.2 10e12/L    Hemoglobin 13.6 11.7 - 15.7 g/dL    Hematocrit 40.8 35.0 - 47.0 %    MCV 93 78 - 100 fl    MCH 30.9 26.5 - 33.0 pg    MCHC 33.3 31.5 - 36.5 g/dL    RDW 12.1 10.0 - 15.0 %    Platelet Count 238 150 - 450 10e9/L    Diff Method Automated Method     % Neutrophils 62.8 %    % Lymphocytes 26.5 %    % Monocytes 6.9 %    % Eosinophils 3.2 %    % Basophils 0.5 %    % Immature Granulocytes 0.1 %    Nucleated RBCs 0 0 /100    Absolute Neutrophil 5.4 1.6 - 8.3 10e9/L    Absolute Lymphocytes 2.3 0.8 - 5.3 10e9/L    Absolute Monocytes 0.6 0.0 - 1.3 10e9/L    Absolute Eosinophils 0.3 0.0 - 0.7 10e9/L    Absolute Basophils 0.0 0.0 - 0.2 10e9/L    Abs Immature Granulocytes 0.0 0 - 0.4 10e9/L    Absolute Nucleated RBC 0.0    Comprehensive metabolic panel   Result Value Ref Range    Sodium 138 133 - 144 mmol/L    Potassium 3.6 3.4 - 5.3 mmol/L    Chloride 107 94 - 109 mmol/L    Carbon Dioxide 25 20 - 32 mmol/L    Anion Gap 6 3 - 14 mmol/L    Glucose 82 70 - 99 mg/dL    Urea Nitrogen 8 7 - 30 mg/dL    Creatinine 0.70 0.52 - 1.04 mg/dL    GFR Estimate >90 >60 mL/min/[1.73_m2]    GFR Estimate If Black >90 >60 mL/min/[1.73_m2]    Calcium 8.7 8.5 - 10.1 mg/dL     Bilirubin Total 0.9 0.2 - 1.3 mg/dL    Albumin 3.8 3.4 - 5.0 g/dL    Protein Total 6.9 6.8 - 8.8 g/dL    Alkaline Phosphatase 44 40 - 150 U/L    ALT 29 0 - 50 U/L    AST 12 0 - 45 U/L   Lipase   Result Value Ref Range    Lipase 69 (L) 73 - 393 U/L       Medications - No data to display    Assessments & Plan (with Medical Decision Making)     I have reviewed the nursing notes.    I have reviewed the findings, diagnosis, plan and need for follow up with the patient.      New Prescriptions    PANTOPRAZOLE (PROTONIX) 40 MG EC TABLET    Take 1 tablet (40 mg) by mouth daily for 30 doses    SUCRALFATE (CARAFATE) 1 GM TABLET    Take 1 tablet (1 g) by mouth 4 times daily       Final diagnoses:   Abdominal pain, epigastric       3/29/2021   [facility]

## 2021-04-06 ENCOUNTER — HOSPITAL ENCOUNTER (OUTPATIENT)
Dept: ULTRASOUND IMAGING | Facility: HOSPITAL | Age: 31
Discharge: HOME | End: 2021-04-06
Attending: INTERNAL MEDICINE | Admitting: INTERNAL MEDICINE
Payer: MEDICAID

## 2021-04-06 DIAGNOSIS — Q61.2 POLYCYSTIC KIDNEY, AUTOSOMAL DOMINANT: ICD-10-CM

## 2021-04-06 PROCEDURE — 76770 US EXAM ABDO BACK WALL COMP: CPT

## 2021-04-16 ENCOUNTER — TRANSFERRED RECORDS (OUTPATIENT)
Dept: HEALTH INFORMATION MANAGEMENT | Facility: CLINIC | Age: 31
End: 2021-04-16

## 2021-04-16 NOTE — PROGRESS NOTES
"    Assessment & Plan     Abdominal pain, epigastric  Reviewed at some length.  Review of ER workup, labs, etc.  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  Consider EGD.  eval GB first with US.  Pain seems like gastritis/other by history, but r/o GB.  Known kidney issue ongoing and stable.  Reassuring there.  Labs all good.  Continue protonix.  US.  Then, likely EGD consideration with surgery if all is normal there.  She understands.    - US Abdomen Limited; Future  - pantoprazole (PROTONIX) 40 MG EC tablet; Take 1 tablet (40 mg) by mouth daily      25 minutes spent on the date of the encounter doing chart review, interpretation of tests, patient visit and documentation        BMI:   Estimated body mass index is 26.96 kg/m  as calculated from the following:    Height as of this encounter: 1.651 m (5' 5\").    Weight as of this encounter: 73.5 kg (162 lb).           No follow-ups on file.    Maximus Kemp MD  [facility]   Latasha is a 30 year old who presents for the following health issues     HPI     ED/UC Followup:    [place]  [facility] [place] of visit: 3/29/21  Reason for visit: abdominal pain  Current Status: still having abdominal pain           Review of Systems   Constitutional, HEENT, cardiovascular, pulmonary, gi and gu systems are negative, except as otherwise noted.      Objective    /62   Pulse 68   Temp 97.7  F (36.5  C)   Ht 1.651 m (5' 5\")   Wt 73.5 kg (162 lb)   SpO2 99%   BMI 26.96 kg/m    Body mass index is 26.96 kg/m .  Physical Exam   GENERAL: healthy, alert and no distress  NECK: no adenopathy, no asymmetry, masses, or scars and thyroid normal to palpation  RESP: lungs clear to auscultation - no rales, rhonchi or wheezes  CV: regular rate and rhythm, normal S1 S2, no S3 or S4, no murmur, click or rub, no peripheral edema and peripheral pulses strong  ABDOMEN: soft, nontender, no hepatosplenomegaly, no masses and bowel sounds " normal  MS: no gross musculoskeletal defects noted, no edema    All ER studies reviewed.

## 2021-04-21 ENCOUNTER — HOSPITAL ENCOUNTER (OUTPATIENT)
Dept: MRI IMAGING | Facility: OTHER | Age: 31
Discharge: HOME | End: 2021-04-21
Attending: INTERNAL MEDICINE | Admitting: FAMILY MEDICINE
Payer: MEDICAID

## 2021-04-21 DIAGNOSIS — Q61.2 POLYCYSTIC KIDNEY, AUTOSOMAL DOMINANT: ICD-10-CM

## 2021-04-21 PROCEDURE — 70544 MR ANGIOGRAPHY HEAD W/O DYE: CPT

## 2021-04-29 ENCOUNTER — OFFICE VISIT (OUTPATIENT)
Dept: FAMILY MEDICINE | Facility: OTHER | Age: 31
End: 2021-04-29
Attending: PHYSICIAN ASSISTANT
Payer: MEDICAID

## 2021-04-29 VITALS
DIASTOLIC BLOOD PRESSURE: 62 MMHG | BODY MASS INDEX: 26.99 KG/M2 | SYSTOLIC BLOOD PRESSURE: 116 MMHG | HEART RATE: 68 BPM | OXYGEN SATURATION: 99 % | TEMPERATURE: 97.7 F | WEIGHT: 162 LBS | HEIGHT: 65 IN

## 2021-04-29 DIAGNOSIS — R10.13 ABDOMINAL PAIN, EPIGASTRIC: Primary | ICD-10-CM

## 2021-04-29 PROCEDURE — G0463 HOSPITAL OUTPT CLINIC VISIT: HCPCS

## 2021-04-29 PROCEDURE — 99214 OFFICE O/P EST MOD 30 MIN: CPT | Performed by: FAMILY MEDICINE

## 2021-04-29 RX ORDER — PANTOPRAZOLE SODIUM 40 MG/1
40 TABLET, DELAYED RELEASE ORAL DAILY
Qty: 90 TABLET | Refills: 0 | Status: SHIPPED | OUTPATIENT
Start: 2021-04-29 | End: 2021-12-13

## 2021-04-29 ASSESSMENT — ANXIETY QUESTIONNAIRES
5. BEING SO RESTLESS THAT IT IS HARD TO SIT STILL: NOT AT ALL
1. FEELING NERVOUS, ANXIOUS, OR ON EDGE: NOT AT ALL
3. WORRYING TOO MUCH ABOUT DIFFERENT THINGS: NOT AT ALL
6. BECOMING EASILY ANNOYED OR IRRITABLE: NOT AT ALL
IF YOU CHECKED OFF ANY PROBLEMS ON THIS QUESTIONNAIRE, HOW DIFFICULT HAVE THESE PROBLEMS MADE IT FOR YOU TO DO YOUR WORK, TAKE CARE OF THINGS AT HOME, OR GET ALONG WITH OTHER PEOPLE: NOT DIFFICULT AT ALL
7. FEELING AFRAID AS IF SOMETHING AWFUL MIGHT HAPPEN: NOT AT ALL
2. NOT BEING ABLE TO STOP OR CONTROL WORRYING: NOT AT ALL
GAD7 TOTAL SCORE: 0

## 2021-04-29 ASSESSMENT — PAIN SCALES - GENERAL: PAINLEVEL: MILD PAIN (2)

## 2021-04-29 ASSESSMENT — PATIENT HEALTH QUESTIONNAIRE - PHQ9
5. POOR APPETITE OR OVEREATING: NOT AT ALL
SUM OF ALL RESPONSES TO PHQ QUESTIONS 1-9: 0

## 2021-04-29 ASSESSMENT — MIFFLIN-ST. JEOR: SCORE: 1455.71

## 2021-04-29 NOTE — NURSING NOTE
"Chief Complaint   Patient presents with     ER F/U       Initial /62   Pulse 68   Temp 97.7  F (36.5  C)   Ht 1.651 m (5' 5\")   Wt 73.5 kg (162 lb)   SpO2 99%   BMI 26.96 kg/m   Estimated body mass index is 26.96 kg/m  as calculated from the following:    Height as of this encounter: 1.651 m (5' 5\").    Weight as of this encounter: 73.5 kg (162 lb).  Medication Reconciliation: complete  Padmini Mejía, LPN  "

## 2021-04-30 ASSESSMENT — ANXIETY QUESTIONNAIRES: GAD7 TOTAL SCORE: 0

## 2021-05-07 ENCOUNTER — HOSPITAL ENCOUNTER (OUTPATIENT)
Dept: ULTRASOUND IMAGING | Facility: HOSPITAL | Age: 31
Discharge: HOME | End: 2021-05-07
Attending: FAMILY MEDICINE | Admitting: FAMILY MEDICINE
Payer: MEDICAID

## 2021-05-07 DIAGNOSIS — R10.13 ABDOMINAL PAIN, EPIGASTRIC: ICD-10-CM

## 2021-05-07 PROCEDURE — 76705 ECHO EXAM OF ABDOMEN: CPT

## 2021-05-11 ENCOUNTER — MYC MEDICAL ADVICE (OUTPATIENT)
Dept: FAMILY MEDICINE | Facility: OTHER | Age: 31
End: 2021-05-11

## 2021-05-11 DIAGNOSIS — R10.13 ABDOMINAL PAIN, EPIGASTRIC: Primary | ICD-10-CM

## 2021-05-19 ENCOUNTER — PREP FOR PROCEDURE (OUTPATIENT)
Dept: SURGERY | Facility: OTHER | Age: 31
End: 2021-05-19

## 2021-05-19 ENCOUNTER — OFFICE VISIT (OUTPATIENT)
Dept: SURGERY | Facility: OTHER | Age: 31
End: 2021-05-19
Attending: FAMILY MEDICINE
Payer: MEDICAID

## 2021-05-19 VITALS
TEMPERATURE: 97.8 F | OXYGEN SATURATION: 98 % | HEART RATE: 82 BPM | DIASTOLIC BLOOD PRESSURE: 60 MMHG | HEIGHT: 65 IN | SYSTOLIC BLOOD PRESSURE: 102 MMHG | BODY MASS INDEX: 26.99 KG/M2 | WEIGHT: 162 LBS | RESPIRATION RATE: 16 BRPM

## 2021-05-19 DIAGNOSIS — Z01.818 PREOP TESTING: Primary | ICD-10-CM

## 2021-05-19 DIAGNOSIS — R10.13 EPIGASTRIC PAIN: Primary | ICD-10-CM

## 2021-05-19 DIAGNOSIS — R10.13 ABDOMINAL PAIN, EPIGASTRIC: ICD-10-CM

## 2021-05-19 PROCEDURE — G0463 HOSPITAL OUTPT CLINIC VISIT: HCPCS

## 2021-05-19 PROCEDURE — 99204 OFFICE O/P NEW MOD 45 MIN: CPT | Performed by: SURGERY

## 2021-05-19 ASSESSMENT — MIFFLIN-ST. JEOR: SCORE: 1455.71

## 2021-05-19 ASSESSMENT — PAIN SCALES - GENERAL: PAINLEVEL: MILD PAIN (3)

## 2021-05-19 NOTE — PATIENT INSTRUCTIONS
Thank you for allowing our surgical team to participate in your care. Please review the following instructions to prepare for your upcoming Upper Endoscopy. You may call any of the numbers listed below with questions you may have.  [facility] Health Unit Coordinator: [phone]  Clinic Surgery Nurse (Mara): [phone]  Surgery Education Nurse: [phone]    Date of Procedure: 6/10/2021 with Dr. Silva  Admit time: Surgery  will call you the day before your procedure by 5pm with your admit time. If your surgery is on Monday, please expect a call on Friday. If we were unable to reach you by 5PM, you may call  [phone] for your arrival time.    COVID-19 test is needed 4-5 days before procedure in the morning. This testing is done in the [facility] on the West side of [facility] (weekdays and weekends) or at the [facility] through door #3 (weekdays only).  Follow the signage for parking and bring your mobile phone (if you have one) to call the phone number ([phone]) on the sign outside the testing site for check-in. Stay in your vehicle until you are directed to enter. If you do not have a cell phone, please call the nurse for instructions on checking in. This has been scheduled for 6/10 at 10:15 at the [place] site.      Please call the Surgery Education Nurses 1 week prior to your surgery date at  [phone] for further instructions. Have your medication/allergy lists ready.    Do not take Aspirin (325mg), other NSAIDs (Ibuprofen, Motrin, Aleve, Celebrex, Naproxen, etc.) vitamins/supplements 7 days before your surgery.   If you are on blood thinners or insulin, please call your primary care provider for instruction. If you are prescribed an 81 mg Aspirin, you may continue.    Please call the clinic surgery nurse or your regular doctor if you get sick within 1-2 weeks of the procedure. (vomiting, diarrhea, fever, cough, cold or any other symptoms of  illness)    Do not eat any solid foods or milk products after 10pm the night prior to surgery.   You may have clear liquids only up until 4 hours prior to surgery.   Please see the table at the end of instructions for a list of clear liquids.     You will need a responsible adult available to drive you home and stay with you for at least 4 hours after you leave the hospital. You will not be allowed to drive yourself. If you need to take a taxi or the bus you must have a responsible person to ride with you (not the taxi/). Your procedure will be cancelled if you do not bring a responsible adult.    Questions or concerns can be directed to the clinic or surgery education nurse at any of the numbers listed above. If you have a scheduling or appointment question, please call the Health Unit Coordinator between 8am and 4pm Monday through Friday. After hours or on weekends, please call [phone] to postpone.         Clear Liquid Diet  You may have: Do NOT have:     ? Tea, coffee (no cream)   Milk or milk products such as ice cream, malts or shakes     ? Water, Vitamin Water, Smart Water, Coconut Water, PowerAde, Propel, Soda pop, (Sprite, 7 UP, Ginger Ale, Gatorade (not red or purple)   Red or purple drinks of any kind such as  Cranberry juice or grape juice.     ? Clear nutrition drinks (Resource Breeze, Ensure Active protein drink (peach flavor)   Red or purple Jell-O, Popsicles, Donaldo-Aid, Sorbet and candy.     ? Jell-O, Popsicles (no milk or fruit pieces) or Italian ice (not red or purple)   Juices with pulp such as orange, grapefruit, pineapple or tomato juice     ? Water, Vitamin Water, Smart Water, Coconut Water   Cream soups of any kind     ? Fat-free soup broth or bouillon   Alcohol     ? Plain hard candy, such as clear Life Savers (not red or purple)      ? Powdered Lemonade such as Crystal Light, Country Time      ? Clear juices and fruit-flavored drinks such as apple juice, white grape juice, Hi-C and  Donaldo-Aid (not red or purple)      ? Honey / Sugar

## 2021-05-19 NOTE — NURSING NOTE
"Chief Complaint   Patient presents with     Consult For     epigastric pain. Referred by Dr. Kemp       Initial /60   Pulse 82   Temp 97.8  F (36.6  C) (Tympanic)   Resp 16   Ht 1.651 m (5' 5\")   Wt 73.5 kg (162 lb)   SpO2 98%   BMI 26.96 kg/m   Estimated body mass index is 26.96 kg/m  as calculated from the following:    Height as of this encounter: 1.651 m (5' 5\").    Weight as of this encounter: 73.5 kg (162 lb).  Medication Reconciliation: complete  Negar Sandhu LPN    "

## 2021-05-20 PROBLEM — R10.13 EPIGASTRIC PAIN: Status: ACTIVE | Noted: 2021-05-20

## 2021-05-24 NOTE — PROGRESS NOTES
[facility] Surgery Consultation    CC: Epigastric pain     HPI:  This 30 year old year old female is seen at the request of Dr. Kemp for evaluation of epigastric pain. She initially presented to the ED on 3/29 for abdominal pain. She apeared constipated at that time. For follow up with PCP and her pain has continued. She has had workup of her gallbladder with a Ultrasound which was normal. The pain does not seem to be related to eating, she is on PPI now and does not seem to be helping.     Past Medical History:   Diagnosis Date     Acute pancreatitis child    post treuma, from a fall     Hepatitis remote    transient, probably from alcohol     Scoliosis      Tobacco abuse        Past Surgical History:   Procedure Laterality Date     KNEE SURGERY       LAPAROSCOPIC APPENDECTOMY  2013    Procedure: LAPAROSCOPIC APPENDECTOMY;  LAPAROSCOPIC APPENDECTOMY;  Surgeon: Paula Roberts MD;  Location: [facility]       No Known Allergies    Current Outpatient Medications   Medication     pantoprazole (PROTONIX) 40 MG EC tablet     No current facility-administered medications for this visit.        HABITS:    Social History     Tobacco Use     Smoking status: Former Smoker     Packs/day: 0.50     Years: 14.00     Pack years: 7.00     Types: Cigarettes     Start date: 2006     Quit date: 2020     Years since quittin.4     Smokeless tobacco: Never Used   Substance Use Topics     Alcohol use: No     Alcohol/week: 5.0 standard drinks     Types: 6 Cans of beer per week     Comment: occasionally when she goes out' past hx heavier consumption     Drug use: No       Family History   Problem Relation Age of Onset     Thyroid Disease Mother      Diabetes Father      Asthma Sister      Attention Deficit Disorder Brother        REVIEW OF SYSTEMS:  Ten point review of systems negative except those mentioned in the HPI.     OBJECTIVE:    /60   Pulse 82   Temp 97.8  F (36.6  C) (Tympanic)   Resp 16   Ht 1.651  "m (5' 5\")   Wt 73.5 kg (162 lb)   SpO2 98%   BMI 26.96 kg/m      GENERAL: Generally appears well, in no distress with appropriate affect.  HEENT:   Sclerae anicteric - normocephalic atraumatic   Respiratory:  No acute distress, no splinting, CTAB   Cardiovascular:  Regular Rate and Rhythm, no murmur   Abdomen: no bulges mild tenderness in epigastric region.   :  deferred  Extremities:  Extremities normal. No deformities, edema, or skin discoloration.  Skin:  no suspicious lesions or rashes  Neurological: grossly intact  Psych:  Alert, oriented, affect appropriate with normal decision making ability.    IMPRESSION:    30 y.o. female with aprox 6 months of epigastric pain, ultrasound of gallbladder without stones, would like to work-up further with a HIDA scan and setup for an upper endoscopy. Also possible it is pain coming form the chest wall.     PLAN:    Upper endoscopy.   HIDA scan     Arturo Silva MD,     5/24/2021  1:51 PM      "

## 2021-05-24 NOTE — H&P (VIEW-ONLY)
[facility] Surgery Consultation    CC: Epigastric pain     HPI:  This 30 year old year old female is seen at the request of Dr. Kemp for evaluation of epigastric pain. She initially presented to the ED on 3/29 for abdominal pain. She apeared constipated at that time. For follow up with PCP and her pain has continued. She has had workup of her gallbladder with a Ultrasound which was normal. The pain does not seem to be related to eating, she is on PPI now and does not seem to be helping.     Past Medical History:   Diagnosis Date     Acute pancreatitis child    post treuma, from a fall     Hepatitis remote    transient, probably from alcohol     Scoliosis      Tobacco abuse        Past Surgical History:   Procedure Laterality Date     KNEE SURGERY       LAPAROSCOPIC APPENDECTOMY  2013    Procedure: LAPAROSCOPIC APPENDECTOMY;  LAPAROSCOPIC APPENDECTOMY;  Surgeon: Paula Roberts MD;  Location: [facility]       No Known Allergies    Current Outpatient Medications   Medication     pantoprazole (PROTONIX) 40 MG EC tablet     No current facility-administered medications for this visit.        HABITS:    Social History     Tobacco Use     Smoking status: Former Smoker     Packs/day: 0.50     Years: 14.00     Pack years: 7.00     Types: Cigarettes     Start date: 2006     Quit date: 2020     Years since quittin.4     Smokeless tobacco: Never Used   Substance Use Topics     Alcohol use: No     Alcohol/week: 5.0 standard drinks     Types: 6 Cans of beer per week     Comment: occasionally when she goes out' past hx heavier consumption     Drug use: No       Family History   Problem Relation Age of Onset     Thyroid Disease Mother      Diabetes Father      Asthma Sister      Attention Deficit Disorder Brother        REVIEW OF SYSTEMS:  Ten point review of systems negative except those mentioned in the HPI.     OBJECTIVE:    /60   Pulse 82   Temp 97.8  F (36.6  C) (Tympanic)   Resp 16   Ht 1.651  "m (5' 5\")   Wt 73.5 kg (162 lb)   SpO2 98%   BMI 26.96 kg/m      GENERAL: Generally appears well, in no distress with appropriate affect.  HEENT:   Sclerae anicteric - normocephalic atraumatic   Respiratory:  No acute distress, no splinting, CTAB   Cardiovascular:  Regular Rate and Rhythm, no murmur   Abdomen: no bulges mild tenderness in epigastric region.   :  deferred  Extremities:  Extremities normal. No deformities, edema, or skin discoloration.  Skin:  no suspicious lesions or rashes  Neurological: grossly intact  Psych:  Alert, oriented, affect appropriate with normal decision making ability.    IMPRESSION:    30 y.o. female with aprox 6 months of epigastric pain, ultrasound of gallbladder without stones, would like to work-up further with a HIDA scan and setup for an upper endoscopy. Also possible it is pain coming form the chest wall.     PLAN:    Upper endoscopy.   HIDA scan     Arturo Silva MD,     5/24/2021  1:51 PM      "

## 2021-06-02 ENCOUNTER — ANESTHESIA EVENT (OUTPATIENT)
Dept: SURGERY | Facility: HOSPITAL | Age: 31
End: 2021-06-02
Payer: MEDICAID

## 2021-06-02 ASSESSMENT — LIFESTYLE VARIABLES: TOBACCO_USE: 1

## 2021-06-02 NOTE — ANESTHESIA PREPROCEDURE EVALUATION
Anesthesia Pre-Procedure Evaluation    Patient: Latasha Fisher   MRN: [id number] : 1990        Preoperative Diagnosis: Epigastric pain [R10.13]   Procedure : Procedure(s):  upper endoscopy with possible biopsy     Past Medical History:   Diagnosis Date     Acute pancreatitis child    post treuma, from a fall     Hepatitis remote    transient, probably from alcohol     Scoliosis      Tobacco abuse       Past Surgical History:   Procedure Laterality Date     KNEE SURGERY       LAPAROSCOPIC APPENDECTOMY  2013    Procedure: LAPAROSCOPIC APPENDECTOMY;  LAPAROSCOPIC APPENDECTOMY;  Surgeon: Paula Roberts MD;  Location: [facility]      No Active Allergies   Social History     Tobacco Use     Smoking status: Former Smoker     Packs/day: 0.50     Years: 14.00     Pack years: 7.00     Types: Cigarettes     Start date: 2006     Quit date: 2020     Years since quittin.4     Smokeless tobacco: Never Used   Substance Use Topics     Alcohol use: No     Alcohol/week: 5.0 standard drinks     Types: 6 Cans of beer per week     Comment: occasionally when she goes out' past hx heavier consumption      Wt Readings from Last 1 Encounters:   21 73.5 kg (162 lb)        Anesthesia Evaluation   Pt has had prior anesthetic. Type: General.        ROS/MED HX  ENT/Pulmonary: Comment: Quit smoking     (+) tobacco use, Past use, 7  Pack-Year Hx,      Neurologic:  - neg neurologic ROS     Cardiovascular:  - neg cardiovascular ROS     METS/Exercise Tolerance: >4 METS    Hematologic:       Musculoskeletal: Comment: Scoliosis      GI/Hepatic: Comment: Remote history of transient liver disease r/t ETOH  Lap appy     (+) GERD, Asymptomatic on medication, hepatitis resolved hepatitis type Other, liver disease,     Renal/Genitourinary: Comment: Polycystic kidney disease    (+) renal disease, Pt does not require dialysis,     Endo:  - neg endo ROS     Psychiatric/Substance Use: Comment: ETOH daily      Infectious  Disease:  - neg infectious disease ROS     Malignancy:  - neg malignancy ROS     Other:  - neg other ROS          Physical Exam    Airway        Mallampati: I   TM distance: > 3 FB   Neck ROM: full   Mouth opening: > 3 cm    Respiratory Devices and Support         Dental  no notable dental history         Cardiovascular   cardiovascular exam normal       Rhythm and rate: regular and normal     Pulmonary   pulmonary exam normal        breath sounds clear to auscultation           OUTSIDE LABS:  CBC:   Lab Results   Component Value Date    WBC 8.5 03/29/2021    WBC 7.8 12/01/2020    HGB 13.6 03/29/2021    HGB 14.2 12/01/2020    HCT 40.8 03/29/2021    HCT 41.9 12/01/2020     03/29/2021     12/01/2020     BMP:   Lab Results   Component Value Date     03/29/2021     02/25/2021    POTASSIUM 3.6 03/29/2021    POTASSIUM 3.7 02/25/2021    CHLORIDE 107 03/29/2021    CHLORIDE 106 02/25/2021    CO2 25 03/29/2021    CO2 24 02/25/2021    BUN 8 03/29/2021    BUN 10 02/25/2021    CR 0.70 03/29/2021    CR 0.66 02/25/2021    GLC 82 03/29/2021    GLC 81 02/25/2021     COAGS: No results found for: PTT, INR, FIBR  POC:   Lab Results   Component Value Date    HCG Negative 03/29/2021     HEPATIC:   Lab Results   Component Value Date    ALBUMIN 3.8 03/29/2021    PROTTOTAL 6.9 03/29/2021    ALT 29 03/29/2021    AST 12 03/29/2021    ALKPHOS 44 03/29/2021    BILITOTAL 0.9 03/29/2021     OTHER:   Lab Results   Component Value Date    SWATHI 8.7 03/29/2021    PHOS 3.0 07/13/2016    LIPASE 69 (L) 03/29/2021    CRP 23.9 (H) 09/27/2017    SED 5 03/24/2016       Anesthesia Plan    ASA Status:  3   NPO Status:  NPO Appropriate    Anesthesia Type: MAC.     - Reason for MAC: straight local not clinically adequate   Induction: Propofol.   Maintenance: TIVA.        Consents    Anesthesia Plan(s) and associated risks, benefits, and realistic alternatives discussed. Questions answered and patient/representative(s) expressed  understanding.     - Discussed with:  Patient         Postoperative Care       PONV prophylaxis: Background Propofol Infusion     Comments:    Risks and benefits of MAC anesthetic discussed including dental damage, aspiration, loss of airway, conversion to general anesthetic, CV complications, MI, stroke, death. Pt wishes to proceed.             SHELLI Domínguez CRNA

## 2021-06-06 ENCOUNTER — OFFICE VISIT (OUTPATIENT)
Dept: FAMILY MEDICINE | Facility: OTHER | Age: 31
End: 2021-06-06
Attending: FAMILY MEDICINE
Payer: MEDICAID

## 2021-06-06 DIAGNOSIS — Z01.818 PREOP TESTING: ICD-10-CM

## 2021-06-06 LAB
SARS-COV-2 RNA RESP QL NAA+PROBE: NORMAL
SPECIMEN SOURCE: NORMAL

## 2021-06-06 PROCEDURE — U0005 INFEC AGEN DETEC AMPLI PROBE: HCPCS | Mod: ZL | Performed by: SURGERY

## 2021-06-06 PROCEDURE — U0003 INFECTIOUS AGENT DETECTION BY NUCLEIC ACID (DNA OR RNA); SEVERE ACUTE RESPIRATORY SYNDROME CORONAVIRUS 2 (SARS-COV-2) (CORONAVIRUS DISEASE [COVID-19]), AMPLIFIED PROBE TECHNIQUE, MAKING USE OF HIGH THROUGHPUT TECHNOLOGIES AS DESCRIBED BY CMS-2020-01-R: HCPCS | Mod: ZL | Performed by: SURGERY

## 2021-06-07 LAB
LABORATORY COMMENT REPORT: NORMAL
SARS-COV-2 RNA RESP QL NAA+PROBE: NEGATIVE
SPECIMEN SOURCE: NORMAL

## 2021-06-10 ENCOUNTER — APPOINTMENT (OUTPATIENT)
Dept: LAB | Facility: HOSPITAL | Age: 31
End: 2021-06-10
Attending: SURGERY
Payer: MEDICAID

## 2021-06-10 ENCOUNTER — ANESTHESIA (OUTPATIENT)
Dept: SURGERY | Facility: HOSPITAL | Age: 31
End: 2021-06-10
Payer: MEDICAID

## 2021-06-10 ENCOUNTER — HOSPITAL ENCOUNTER (OUTPATIENT)
Facility: HOSPITAL | Age: 31
Discharge: HOME | End: 2021-06-10
Attending: SURGERY | Admitting: SURGERY
Payer: MEDICAID

## 2021-06-10 VITALS
DIASTOLIC BLOOD PRESSURE: 82 MMHG | RESPIRATION RATE: 16 BRPM | SYSTOLIC BLOOD PRESSURE: 109 MMHG | BODY MASS INDEX: 26.66 KG/M2 | HEIGHT: 65 IN | TEMPERATURE: 96.4 F | WEIGHT: 160 LBS | OXYGEN SATURATION: 97 % | HEART RATE: 62 BPM

## 2021-06-10 DIAGNOSIS — R10.13 EPIGASTRIC PAIN: ICD-10-CM

## 2021-06-10 LAB — HCG UR QL: NEGATIVE

## 2021-06-10 PROCEDURE — 250N000009 HC RX 250: Performed by: NURSE ANESTHETIST, CERTIFIED REGISTERED

## 2021-06-10 PROCEDURE — 710N000012 HC RECOVERY PHASE 2, PER MINUTE: Performed by: SURGERY

## 2021-06-10 PROCEDURE — 81025 URINE PREGNANCY TEST: CPT | Performed by: NURSE ANESTHETIST, CERTIFIED REGISTERED

## 2021-06-10 PROCEDURE — 88305 TISSUE EXAM BY PATHOLOGIST: CPT | Mod: TC | Performed by: SURGERY

## 2021-06-10 PROCEDURE — 272N000001 HC OR GENERAL SUPPLY STERILE: Performed by: SURGERY

## 2021-06-10 PROCEDURE — 258N000003 HC RX IP 258 OP 636: Performed by: NURSE ANESTHETIST, CERTIFIED REGISTERED

## 2021-06-10 PROCEDURE — 999N000141 HC STATISTIC PRE-PROCEDURE NURSING ASSESSMENT: Performed by: SURGERY

## 2021-06-10 PROCEDURE — 370N000017 HC ANESTHESIA TECHNICAL FEE, PER MIN: Performed by: SURGERY

## 2021-06-10 PROCEDURE — 43239 EGD BIOPSY SINGLE/MULTIPLE: CPT | Performed by: SURGERY

## 2021-06-10 PROCEDURE — 250N000011 HC RX IP 250 OP 636: Performed by: NURSE ANESTHETIST, CERTIFIED REGISTERED

## 2021-06-10 PROCEDURE — 43239 EGD BIOPSY SINGLE/MULTIPLE: CPT | Performed by: NURSE ANESTHETIST, CERTIFIED REGISTERED

## 2021-06-10 PROCEDURE — 250N000009 HC RX 250: Performed by: SURGERY

## 2021-06-10 PROCEDURE — 360N000075 HC SURGERY LEVEL 2, PER MIN: Performed by: SURGERY

## 2021-06-10 RX ORDER — ONDANSETRON 4 MG/1
4 TABLET, ORALLY DISINTEGRATING ORAL EVERY 30 MIN PRN
Status: DISCONTINUED | OUTPATIENT
Start: 2021-06-10 | End: 2021-06-10 | Stop reason: HOSPADM

## 2021-06-10 RX ORDER — FLUMAZENIL 0.1 MG/ML
0.2 INJECTION, SOLUTION INTRAVENOUS
Status: CANCELLED | OUTPATIENT
Start: 2021-06-10 | End: 2021-06-10

## 2021-06-10 RX ORDER — SODIUM CHLORIDE, SODIUM LACTATE, POTASSIUM CHLORIDE, CALCIUM CHLORIDE 600; 310; 30; 20 MG/100ML; MG/100ML; MG/100ML; MG/100ML
INJECTION, SOLUTION INTRAVENOUS CONTINUOUS
Status: DISCONTINUED | OUTPATIENT
Start: 2021-06-10 | End: 2021-06-10 | Stop reason: HOSPADM

## 2021-06-10 RX ORDER — FENTANYL CITRATE 50 UG/ML
INJECTION, SOLUTION INTRAMUSCULAR; INTRAVENOUS PRN
Status: DISCONTINUED | OUTPATIENT
Start: 2021-06-10 | End: 2021-06-10

## 2021-06-10 RX ORDER — LIDOCAINE 40 MG/G
CREAM TOPICAL
Status: DISCONTINUED | OUTPATIENT
Start: 2021-06-10 | End: 2021-06-10 | Stop reason: HOSPADM

## 2021-06-10 RX ORDER — MEPERIDINE HYDROCHLORIDE 25 MG/ML
12.5 INJECTION INTRAMUSCULAR; INTRAVENOUS; SUBCUTANEOUS
Status: DISCONTINUED | OUTPATIENT
Start: 2021-06-10 | End: 2021-06-10 | Stop reason: HOSPADM

## 2021-06-10 RX ORDER — NALOXONE HYDROCHLORIDE 0.4 MG/ML
0.2 INJECTION, SOLUTION INTRAMUSCULAR; INTRAVENOUS; SUBCUTANEOUS
Status: DISCONTINUED | OUTPATIENT
Start: 2021-06-10 | End: 2021-06-10 | Stop reason: HOSPADM

## 2021-06-10 RX ORDER — NALOXONE HYDROCHLORIDE 0.4 MG/ML
0.4 INJECTION, SOLUTION INTRAMUSCULAR; INTRAVENOUS; SUBCUTANEOUS
Status: DISCONTINUED | OUTPATIENT
Start: 2021-06-10 | End: 2021-06-10 | Stop reason: HOSPADM

## 2021-06-10 RX ORDER — LIDOCAINE HYDROCHLORIDE 20 MG/ML
INJECTION, SOLUTION INFILTRATION; PERINEURAL PRN
Status: DISCONTINUED | OUTPATIENT
Start: 2021-06-10 | End: 2021-06-10

## 2021-06-10 RX ORDER — PROPOFOL 10 MG/ML
INJECTION, EMULSION INTRAVENOUS PRN
Status: DISCONTINUED | OUTPATIENT
Start: 2021-06-10 | End: 2021-06-10

## 2021-06-10 RX ORDER — ONDANSETRON 2 MG/ML
4 INJECTION INTRAMUSCULAR; INTRAVENOUS EVERY 30 MIN PRN
Status: DISCONTINUED | OUTPATIENT
Start: 2021-06-10 | End: 2021-06-10 | Stop reason: HOSPADM

## 2021-06-10 RX ADMIN — PROPOFOL 50 MG: 10 INJECTION, EMULSION INTRAVENOUS at 08:02

## 2021-06-10 RX ADMIN — FENTANYL CITRATE 50 MCG: 50 INJECTION, SOLUTION INTRAMUSCULAR; INTRAVENOUS at 07:56

## 2021-06-10 RX ADMIN — SODIUM CHLORIDE, POTASSIUM CHLORIDE, SODIUM LACTATE AND CALCIUM CHLORIDE: 600; 310; 30; 20 INJECTION, SOLUTION INTRAVENOUS at 07:41

## 2021-06-10 RX ADMIN — MIDAZOLAM 2 MG: 1 INJECTION INTRAMUSCULAR; INTRAVENOUS at 07:54

## 2021-06-10 RX ADMIN — PROPOFOL 50 MG: 10 INJECTION, EMULSION INTRAVENOUS at 07:56

## 2021-06-10 RX ADMIN — LIDOCAINE HYDROCHLORIDE 40 MG: 20 INJECTION, SOLUTION INFILTRATION; PERINEURAL at 07:56

## 2021-06-10 ASSESSMENT — MIFFLIN-ST. JEOR: SCORE: 1446.64

## 2021-06-10 NOTE — ANESTHESIA POSTPROCEDURE EVALUATION
Patient: Latasha Fisher    Procedure(s):  upper endoscopy with biopsy    Diagnosis:Epigastric pain [R10.13]  Diagnosis Additional Information: No value filed.    Anesthesia Type:  MAC    Note:  Disposition: Outpatient   Postop Pain Control: Uneventful            Sign Out: Well controlled pain   PONV: No   Neuro/Psych: Uneventful            Sign Out: Acceptable/Baseline neuro status   Airway/Respiratory: Uneventful            Sign Out: Acceptable/Baseline resp. status   CV/Hemodynamics: Uneventful            Sign Out: Acceptable CV status; No obvious hypovolemia; No obvious fluid overload   Other NRE: NONE   DID A NON-ROUTINE EVENT OCCUR? No           Last vitals:  Vitals:    06/10/21 0835 06/10/21 0840 06/10/21 0845   BP: 118/80 114/78 109/82   Pulse: 80 68 62   Resp: 16 18 16   Temp:      SpO2: 96% 98% 97%       Last vitals prior to Anesthesia Care Transfer:  CRNA VITALS  6/10/2021 0738 - 6/10/2021 0838      6/10/2021             Pulse:  72    SpO2:  99 %    Resp Rate (set):  8          Electronically Signed By: SHELLI Hawkins CRNA  Katlyn 10, 2021  9:07 AM

## 2021-06-10 NOTE — OP NOTE
Latasha Fisher MRN# [id number]   YOB: 1990 Age: 30 year old      Date of Admission:  6/10/2021    Primary care provider: Maximus Kemp    PREOPERATIVE DIAGNOSIS:  Epigastric pain       POSTOPERATIVE DIAGNOSES:    Mild gastritis       PROCEDURE:  Esophagogastroduodenoscopy with cold forceps biopsies.       HISTORY OF PRESENT ILLNESS:  See clinic note      OPERATIVE FINDINGS:  The gastroesophageal junction was noted 39 cm from the incisors.  The Z line was mild irregular with changes suggesting reflux. Mild gastritis, mild blunting of duodenal mucosa.     Specimen(s) submitted to pathology:  Antral biopsies, duodenal biopsy, gastric fundus.     PROCEDURE:  With the patient in the supine position on the transport cart, IV sedation was administered by the nurse anesthetist.  The patient's correct identity and planned procedure were confirmed during a requisite timeout pause.  A bite block was placed and the fiberoptic endoscope was introduced and negotiated through the cricopharyngeus without difficulty.  The length of the esophagus was examined without findings.  The gastroesophageal junction was noted 39 cm from the incisors; the Z line was regular without ulceration, bleeding source or stricture.  There was no  evidence of Gabriel's change.  The stomach was entered and the pylorus was traversed easily.  The gastric mucosa was mildly inflamed with some cobblestoning and edema; there was no evidence of gastric polyp, ulcer or bleeding source.  The duodenum was noted to have mild blunting so was biopsied.  The endoscope was returned to the body of the stomach and retroflex confirmed the absence of abnormality in the cardia.      Random cold forceps biopsies were obtained of the antrum for H. pylori.  Hemostasis was judged adequate on visualization.  There were biopsies taken in the fundus.   The endoscope was returned to the GE junction.   A second circumferential examination of the esophagus was  performed on slow withdrawal of the endoscope without additional finding.  The procedure was satisfactorially tolerated; there was no appreciable blood loss and the patient was returned to Day Surgery in good condition.      Arturo Silva MD  6/10/2021  8:11 AM

## 2021-06-10 NOTE — ANESTHESIA CARE TRANSFER NOTE
Patient: Latasha Fisher    Procedure(s):  upper endoscopy with biopsy    Diagnosis: Epigastric pain [R10.13]  Diagnosis Additional Information: No value filed.    Anesthesia Type:   MAC     Note:    Oropharynx: oropharynx clear of all foreign objects  Level of Consciousness: awake  Oxygen Supplementation: nasal cannula  Level of Supplemental Oxygen (L/min / FiO2): 2  Independent Airway: airway patency satisfactory and stable  Dentition: dentition unchanged  Vital Signs Stable: post-procedure vital signs reviewed and stable  Report to RN Given: handoff report given  Patient transferred to: Phase II    Handoff Report: Identifed the Patient, Identified the Reponsible Provider, Reviewed the pertinent medical history, Discussed the surgical course, Reviewed Intra-OP anesthesia mangement and issues during anesthesia, Set expectations for post-procedure period and Allowed opportunity for questions and acknowledgement of understanding      Vitals: (Last set prior to Anesthesia Care Transfer)  CRNA VITALS  6/10/2021 0738 - 6/10/2021 0808      6/10/2021             Pulse:  81    Ht Rate:  75    SpO2:  100 %    Resp Rate (set):  8        Electronically Signed By: SHELLI Hawkins CRNA  Katlyn 10, 2021  8:08 AM

## 2021-06-10 NOTE — OR NURSING
Patient and responsible adult given discharge instructions with no questions regarding instructions. Brianna score 18/18. Denies pain.  Discharged from unit via walking. Patient discharged to home with brother. Tolerating PO intake.

## 2021-06-10 NOTE — DISCHARGE INSTRUCTIONS
Post-Anesthesia Patient Instructions    IMMEDIATELY FOLLOWING SURGERY:  Do not drive or operate machinery for the first twenty four hours after surgery.  Do not make any important decisions for twenty four hours after surgery or while taking narcotic pain medications or sedatives.  If you develop intractable nausea and vomiting or a severe headache please notify your doctor immediately.    FOLLOW-UP:  Please make an appointment with your surgeon as instructed. You do not need to follow up with anesthesia unless specifically instructed to do so.    WOUND CARE INSTRUCTIONS (if applicable):  Keep a dry clean dressing on the anesthesia/puncture wound site if there is drainage.  Once the wound has quit draining you may leave it open to air.  Generally you should leave the bandage intact for twenty four hours unless there is drainage.  If the epidural site drains for more than 36-48 hours please call the anesthesia department.    QUESTIONS?:  Please feel free to call your physician or the hospital  if you have any questions, and they will be happy to assist you.           UPPER ENDOSCOPY    AFTER THE PROCEDURE    You will return to Same Day Surgery to rest for about an hour before you go home.    The doctor will talk with you and your family.    A family member/friend may visit with you.    You may burp up any air remaining in your stomach.    You may feel dizzy or light-headed from the medicine.    Your nurse will go over the discharge instructions with you and your caregiver and answer any of your questions.      You will be contacted the next day to check on how you are doing.    If biopsies were taken, you will be contacted with the results usually within 3 days.  BACK AT HOME    Rest for an hour or two after you get home.    When your throat is no longer numb and you have a gag reflex, take a few sips of cool water.  If you can swallow comfortably, you may start eating again.    You may have a mild sore  throat for the rest of the day.    You will be contacted with results by the surgeons office within a week. If not contacted in one week, call the surgeons office.  WHAT TO WATCH FOR:  Problems rarely occur after the exam, but it is important to be aware of the early signs of a complication.  Call your doctor immediately if you have:    Difficulty swallowing or breathing    Unusual pain in your stomach or chest    Vomiting blood or dark material that looks like coffee grounds    Black or tarry stools    Temperature over 101.5 degrees    MORE QUESTIONS?  Please ask your doctor or nurse before the exam begins  or call your doctor at the clinic.    IF YOU MUST CANCEL YOUR PROCEDURE THE EVENING/NIGHT BEFORE, PLEASE CALL HOSPITAL ADMITTING -[phone] OR TOLL FREE [phone], [phone].    Phone Numbers:  Hospital - [phone] [phone]  [facility] - [phone]  Surgery Patient Education - [phone] or toll free [phone]

## 2021-06-14 LAB — COPATH REPORT: NORMAL

## 2021-06-23 ENCOUNTER — HOSPITAL ENCOUNTER (OUTPATIENT)
Dept: NUCLEAR MEDICINE | Facility: HOSPITAL | Age: 31
End: 2021-06-23
Attending: SURGERY
Payer: MEDICAID

## 2021-06-23 DIAGNOSIS — R10.13 ABDOMINAL PAIN, EPIGASTRIC: ICD-10-CM

## 2021-06-23 PROCEDURE — 78227 HEPATOBIL SYST IMAGE W/DRUG: CPT

## 2021-06-23 PROCEDURE — A9537 TC99M MEBROFENIN: HCPCS | Performed by: RADIOLOGY

## 2021-06-23 PROCEDURE — 343N000001 HC RX 343: Performed by: RADIOLOGY

## 2021-06-23 RX ORDER — KIT FOR THE PREPARATION OF TECHNETIUM TC 99M MEBROFENIN 45 MG/10ML
4-6 INJECTION, POWDER, LYOPHILIZED, FOR SOLUTION INTRAVENOUS ONCE
Status: COMPLETED | OUTPATIENT
Start: 2021-06-23 | End: 2021-06-23

## 2021-06-23 RX ADMIN — MEBROFENIN 4.75 MILLICURIE: 45 INJECTION, POWDER, LYOPHILIZED, FOR SOLUTION INTRAVENOUS at 12:10

## 2021-06-24 ENCOUNTER — TELEPHONE (OUTPATIENT)
Dept: SURGERY | Facility: OTHER | Age: 31
End: 2021-06-24

## 2021-06-24 NOTE — TELEPHONE ENCOUNTER
Patient called and left  stating she wanted her results.    Please call patient at [phone]    Thank you

## 2021-06-25 NOTE — TELEPHONE ENCOUNTER
Notified patient that HIDA scan was normal and that Dr. Silva indicated that no surgical follow-up is needed. Advised patient she may return to her primary care provider with further questions or concerns.

## 2021-07-21 ENCOUNTER — NURSE TRIAGE (OUTPATIENT)
Dept: FAMILY MEDICINE | Facility: OTHER | Age: 31
End: 2021-07-21

## 2021-07-21 ENCOUNTER — OFFICE VISIT (OUTPATIENT)
Dept: FAMILY MEDICINE | Facility: OTHER | Age: 31
End: 2021-07-21
Attending: FAMILY MEDICINE
Payer: MEDICAID

## 2021-07-21 DIAGNOSIS — Z20.822 COVID-19 RULED OUT: Primary | ICD-10-CM

## 2021-07-21 DIAGNOSIS — Z20.822 SUSPECTED 2019 NOVEL CORONAVIRUS INFECTION: Primary | ICD-10-CM

## 2021-07-21 LAB — SARS-COV-2 RNA RESP QL NAA+PROBE: NEGATIVE

## 2021-07-21 PROCEDURE — U0003 INFECTIOUS AGENT DETECTION BY NUCLEIC ACID (DNA OR RNA); SEVERE ACUTE RESPIRATORY SYNDROME CORONAVIRUS 2 (SARS-COV-2) (CORONAVIRUS DISEASE [COVID-19]), AMPLIFIED PROBE TECHNIQUE, MAKING USE OF HIGH THROUGHPUT TECHNOLOGIES AS DESCRIBED BY CMS-2020-01-R: HCPCS | Mod: ZL

## 2021-07-21 NOTE — TELEPHONE ENCOUNTER
COVID symptoms. Patient scheduled for swab and given care advice.    Reason for Disposition    [1] COVID-19 infection suspected by caller or triager AND [2] mild symptoms (cough, fever, or others) AND [3] no complications or SOB    Additional Information    Negative: SEVERE difficulty breathing (e.g., struggling for each breath, speaks in single words)    Negative: Difficult to awaken or acting confused (e.g., disoriented, slurred speech)    Negative: Bluish (or gray) lips or face now    Negative: Shock suspected (e.g., cold/pale/clammy skin, too weak to stand, low BP, rapid pulse)    Negative: Sounds like a life-threatening emergency to the triager    Negative: [1] COVID-19 exposure AND [2] has not completed COVID-19 vaccine series AND [3] no symptoms    Negative: [1] COVID-19 exposure AND [2] completed COVID-19 vaccine series (fully vaccinated) AND [3] no symptoms    Negative: COVID-19 vaccine reaction suspected (e.g., fever, headache, muscle aches) occurring during days 1-3 after getting vaccine    Negative: COVID-19 vaccine, questions about    Negative: [1] COVID-19 vaccine series completed (fully vaccinated) in past 3 months AND [2] new-onset of COVID-19 symptoms BUT [3] no known exposure    Negative: [1] Had lab test confirmed COVID-19 infection within last 3 monthsAND [2] new-onset of COVID-19 symptoms BUT [3] no known exposure    Negative: [1] Lives with someone known to have influenza (flu test positive) AND [2] flu-like symptoms (e.g., cough, runny nose, sore throat, SOB; with or without fever)    Negative: [1] Adult with possible COVID-19 symptoms AND [2] triager concerned about severity of symptoms or other causes    Negative: COVID-19 and breastfeeding, questions about    Negative: SEVERE or constant chest pain or pressure (Exception: mild central chest pain, present only when coughing)    Negative: MODERATE difficulty breathing (e.g., speaks in phrases, SOB even at rest, pulse 100-120)    Negative: [1]  "Headache AND [2] stiff neck (can't touch chin to chest)    Negative: MILD difficulty breathing (e.g., minimal/no SOB at rest, SOB with walking, pulse <100)    Negative: Chest pain or pressure    Negative: Patient sounds very sick or weak to the triager    Negative: Fever > 103 F (39.4 C)    Negative: [1] Fever > 101 F (38.3 C) AND [2] age > 60 years    Negative: [1] Fever > 100.0 F (37.8 C) AND [2] bedridden (e.g., nursing home patient, CVA, chronic illness, recovering from surgery)    Negative: [1] HIGH RISK patient (e.g., age > 64 years, diabetes, heart or lung disease, weak immune system) AND [2] new or worsening symptoms    Negative: [1] HIGH RISK patient AND [2] influenza is widespread in the community AND [3] ONE OR MORE respiratory symptoms: cough, sore throat, runny or stuffy nose    Negative: [1] HIGH RISK patient AND [2] influenza exposure within the last 7 days AND [3] ONE OR MORE respiratory symptoms: cough, sore throat, runny or stuffy nose    Negative: Fever present > 3 days (72 hours)    Negative: [1] Fever returns after gone for over 24 hours AND [2] symptoms worse or not improved    Negative: [1] Continuous (nonstop) coughing interferes with work or school AND [2] no improvement using cough treatment per protocol    Answer Assessment - Initial Assessment Questions  1. COVID-19 DIAGNOSIS: \"Who made your Coronavirus (COVID-19) diagnosis?\" \"Was it confirmed by a positive lab test?\" If not diagnosed by a HCP, ask \"Are there lots of cases (community spread) where you live?\" (See public health department website, if unsure)      no  2. COVID-19 EXPOSURE: \"Was there any known exposure to COVID before the symptoms began?\" CDC Definition of close contact: within 6 feet (2 meters) for a total of 15 minutes or more over a 24-hour period.       no  3. ONSET: \"When did the COVID-19 symptoms start?\"       Couple days ago  4. WORST SYMPTOM: \"What is your worst symptom?\" (e.g., cough, fever, shortness of breath, " "muscle aches)      Dry cough and nasal congestion  5. COUGH: \"Do you have a cough?\" If so, ask: \"How bad is the cough?\"        Yes, mild  6. FEVER: \"Do you have a fever?\" If so, ask: \"What is your temperature, how was it measured, and when did it start?\"      no  7. RESPIRATORY STATUS: \"Describe your breathing?\" (e.g., shortness of breath, wheezing, unable to speak)       No difficulty breathing other than nasal congestion  8. BETTER-SAME-WORSE: \"Are you getting better, staying the same or getting worse compared to yesterday?\"  If getting worse, ask, \"In what way?\"      worse  9. HIGH RISK DISEASE: \"Do you have any chronic medical problems?\" (e.g., asthma, heart or lung disease, weak immune system, obesity, etc.)      no  10. PREGNANCY: \"Is there any chance you are pregnant?\" \"When was your last menstrual period?\"        no  11. OTHER SYMPTOMS: \"Do you have any other symptoms?\"  (e.g., chills, fatigue, headache, loss of smell or taste, muscle pain, sore throat; new loss of smell or taste especially support the diagnosis of COVID-19)        Muscle pain, sore throat, loss of taste    Protocols used: CORONAVIRUS (COVID-19) DIAGNOSED OR [id number] 3.25      "

## 2021-10-02 ENCOUNTER — HEALTH MAINTENANCE LETTER (OUTPATIENT)
Age: 31
End: 2021-10-02

## 2021-12-13 ENCOUNTER — OFFICE VISIT (OUTPATIENT)
Dept: FAMILY MEDICINE | Facility: OTHER | Age: 31
End: 2021-12-13
Attending: PHYSICIAN ASSISTANT
Payer: MEDICAID

## 2021-12-13 VITALS
SYSTOLIC BLOOD PRESSURE: 104 MMHG | DIASTOLIC BLOOD PRESSURE: 62 MMHG | TEMPERATURE: 98.1 F | BODY MASS INDEX: 27.29 KG/M2 | OXYGEN SATURATION: 98 % | HEART RATE: 76 BPM | WEIGHT: 164 LBS

## 2021-12-13 DIAGNOSIS — Z30.011 ENCOUNTER FOR INITIAL PRESCRIPTION OF CONTRACEPTIVE PILLS: Primary | ICD-10-CM

## 2021-12-13 LAB — HCG UR QL: NEGATIVE

## 2021-12-13 PROCEDURE — 99213 OFFICE O/P EST LOW 20 MIN: CPT | Performed by: PHYSICIAN ASSISTANT

## 2021-12-13 PROCEDURE — 87491 CHLMYD TRACH DNA AMP PROBE: CPT | Mod: ZL | Performed by: PHYSICIAN ASSISTANT

## 2021-12-13 PROCEDURE — G0463 HOSPITAL OUTPT CLINIC VISIT: HCPCS

## 2021-12-13 PROCEDURE — 81025 URINE PREGNANCY TEST: CPT | Mod: ZL | Performed by: PHYSICIAN ASSISTANT

## 2021-12-13 RX ORDER — NORGESTIMATE AND ETHINYL ESTRADIOL 7DAYSX3 28
1 KIT ORAL DAILY
Qty: 90 TABLET | Refills: 3 | Status: SHIPPED | OUTPATIENT
Start: 2021-12-13 | End: 2022-01-20

## 2021-12-13 ASSESSMENT — PAIN SCALES - GENERAL: PAINLEVEL: NO PAIN (0)

## 2021-12-13 NOTE — NURSING NOTE
"Chief Complaint   Patient presents with     Contraception       Initial /62   Pulse 76   Temp 98.1  F (36.7  C)   Wt 74.4 kg (164 lb)   SpO2 98%   BMI 27.29 kg/m   Estimated body mass index is 27.29 kg/m  as calculated from the following:    Height as of 6/10/21: 1.651 m (5' 5\").    Weight as of this encounter: 74.4 kg (164 lb).  Medication Reconciliation: complete  Padmini Mejía LPN  "

## 2021-12-13 NOTE — PROGRESS NOTES
"  Assessment & Plan     (Z30.011) Encounter for initial prescription of contraceptive pills  (primary encounter diagnosis)  Comment:lab today. Written info to patient. LMP 11-17. Sunday start of OCP.  Plan: GC/Chlamydia by PCR - [facility], HCG Qual, Urine         ([id number]), norgestim-eth estrad triphasic         (ORTHO TRI-CYCLEN) 0.18/0.215/0.25 MG-35 MCG         tablet                         BMI:   Estimated body mass index is 27.29 kg/m  as calculated from the following:    Height as of 6/10/21: 1.651 m (5' 5\").    Weight as of this encounter: 74.4 kg (164 lb).           No follow-ups on file.    SOSA Kumar  [facility] is a 31 year old who presents for the following health issues     HPI     Birth control       Duration: LMP 11/17/21 approx     Description (location/character/radiation): birth control    Intensity:  mild    Accompanying signs and symptoms: painful periods    History (similar episodes/previous evaluation): None    Precipitating or alleviating factors: None    Therapies tried and outcome: pt would like to try birth control pills            Review of Systems   Constitutional, HEENT, cardiovascular, pulmonary, gi and gu systems are negative, except as otherwise noted.      Objective    /62   Pulse 76   Temp 98.1  F (36.7  C)   Wt 74.4 kg (164 lb)   SpO2 98%   BMI 27.29 kg/m    Body mass index is 27.29 kg/m .  Physical Exam   Gen:Appears well, in no apparent distress.   Resp: Lungs CTA without wheeze, rales, rhonchi  Card: RRR  Abd:Round, soft. Normal bowel sounds. NTTP. No mass or organomegaly.                  "

## 2021-12-14 LAB
C TRACH DNA SPEC QL PROBE+SIG AMP: NEGATIVE
N GONORRHOEA DNA SPEC QL NAA+PROBE: NEGATIVE

## 2022-01-20 ENCOUNTER — PRENATAL OFFICE VISIT (OUTPATIENT)
Dept: OBGYN | Facility: OTHER | Age: 32
End: 2022-01-20
Attending: OBSTETRICS & GYNECOLOGY
Payer: MEDICAID

## 2022-01-20 VITALS
BODY MASS INDEX: 27.21 KG/M2 | HEIGHT: 65 IN | OXYGEN SATURATION: 98 % | SYSTOLIC BLOOD PRESSURE: 100 MMHG | DIASTOLIC BLOOD PRESSURE: 68 MMHG | HEART RATE: 85 BPM | WEIGHT: 163.3 LBS

## 2022-01-20 DIAGNOSIS — Z34.91 PREGNANCY WITH UNCERTAIN DATES IN FIRST TRIMESTER: ICD-10-CM

## 2022-01-20 DIAGNOSIS — Z32.01 PREGNANCY TEST POSITIVE: Primary | ICD-10-CM

## 2022-01-20 DIAGNOSIS — Z34.80 PREGNANCY IN MULTIGRAVIDA: ICD-10-CM

## 2022-01-20 LAB
ABO/RH(D): NORMAL
ALBUMIN UR-MCNC: NEGATIVE MG/DL
AMPHETAMINES UR QL: NOT DETECTED
ANTIBODY SCREEN: NEGATIVE
APPEARANCE UR: ABNORMAL
BACTERIA #/AREA URNS HPF: ABNORMAL /HPF
BARBITURATES UR QL SCN: NOT DETECTED
BENZODIAZ UR QL SCN: NOT DETECTED
BILIRUB UR QL STRIP: NEGATIVE
BUPRENORPHINE UR QL: NOT DETECTED
CANNABINOIDS UR QL: NOT DETECTED
COCAINE UR QL SCN: NOT DETECTED
COLOR UR AUTO: ABNORMAL
D-METHAMPHET UR QL: NOT DETECTED
ERYTHROCYTE [DISTWIDTH] IN BLOOD BY AUTOMATED COUNT: 12.4 % (ref 10–15)
GLUCOSE UR STRIP-MCNC: NEGATIVE MG/DL
HCT VFR BLD AUTO: 40.5 % (ref 35–47)
HGB BLD-MCNC: 13.2 G/DL (ref 11.7–15.7)
HGB UR QL STRIP: NEGATIVE
KETONES UR STRIP-MCNC: NEGATIVE MG/DL
LEUKOCYTE ESTERASE UR QL STRIP: NEGATIVE
MCH RBC QN AUTO: 30.1 PG (ref 26.5–33)
MCHC RBC AUTO-ENTMCNC: 32.6 G/DL (ref 31.5–36.5)
MCV RBC AUTO: 93 FL (ref 78–100)
METHADONE UR QL SCN: NOT DETECTED
MUCOUS THREADS #/AREA URNS LPF: PRESENT /LPF
NITRATE UR QL: NEGATIVE
OPIATES UR QL SCN: NOT DETECTED
OXYCODONE UR QL SCN: NOT DETECTED
PCP UR QL SCN: NOT DETECTED
PH UR STRIP: 7 [PH] (ref 4.7–8)
PLATELET # BLD AUTO: 313 10E3/UL (ref 150–450)
PROPOXYPH UR QL: NOT DETECTED
RBC # BLD AUTO: 4.38 10E6/UL (ref 3.8–5.2)
RBC URINE: 2 /HPF
SP GR UR STRIP: 1.01 (ref 1–1.03)
SPECIMEN EXPIRATION DATE: NORMAL
SPERM #/AREA URNS HPF: PRESENT /HPF
SQUAMOUS EPITHELIAL: 1 /HPF
TRICYCLICS UR QL SCN: NOT DETECTED
UROBILINOGEN UR STRIP-MCNC: NORMAL MG/DL
WBC # BLD AUTO: 10.4 10E3/UL (ref 4–11)
WBC CLUMPS #/AREA URNS HPF: PRESENT /HPF
WBC URINE: 6 /HPF
YEAST #/AREA URNS HPF: ABNORMAL /HPF

## 2022-01-20 PROCEDURE — 85027 COMPLETE CBC AUTOMATED: CPT | Mod: ZL | Performed by: OBSTETRICS & GYNECOLOGY

## 2022-01-20 PROCEDURE — 87086 URINE CULTURE/COLONY COUNT: CPT | Mod: ZL | Performed by: OBSTETRICS & GYNECOLOGY

## 2022-01-20 PROCEDURE — 86901 BLOOD TYPING SEROLOGIC RH(D): CPT | Performed by: OBSTETRICS & GYNECOLOGY

## 2022-01-20 PROCEDURE — 86803 HEPATITIS C AB TEST: CPT | Mod: ZL | Performed by: OBSTETRICS & GYNECOLOGY

## 2022-01-20 PROCEDURE — 81001 URINALYSIS AUTO W/SCOPE: CPT | Mod: ZL,59 | Performed by: OBSTETRICS & GYNECOLOGY

## 2022-01-20 PROCEDURE — 87389 HIV-1 AG W/HIV-1&-2 AB AG IA: CPT | Mod: ZL | Performed by: OBSTETRICS & GYNECOLOGY

## 2022-01-20 PROCEDURE — G0463 HOSPITAL OUTPT CLINIC VISIT: HCPCS

## 2022-01-20 PROCEDURE — 87340 HEPATITIS B SURFACE AG IA: CPT | Mod: ZL | Performed by: OBSTETRICS & GYNECOLOGY

## 2022-01-20 PROCEDURE — G0463 HOSPITAL OUTPT CLINIC VISIT: HCPCS | Mod: 25

## 2022-01-20 PROCEDURE — 86762 RUBELLA ANTIBODY: CPT | Mod: ZL | Performed by: OBSTETRICS & GYNECOLOGY

## 2022-01-20 PROCEDURE — 76817 TRANSVAGINAL US OBSTETRIC: CPT | Performed by: OBSTETRICS & GYNECOLOGY

## 2022-01-20 PROCEDURE — 36415 COLL VENOUS BLD VENIPUNCTURE: CPT | Mod: ZL | Performed by: OBSTETRICS & GYNECOLOGY

## 2022-01-20 PROCEDURE — 80306 DRUG TEST PRSMV INSTRMNT: CPT | Mod: ZL | Performed by: OBSTETRICS & GYNECOLOGY

## 2022-01-20 PROCEDURE — 86780 TREPONEMA PALLIDUM: CPT | Mod: ZL | Performed by: OBSTETRICS & GYNECOLOGY

## 2022-01-20 PROCEDURE — 99207 PR FIRST OB VISIT: CPT | Performed by: OBSTETRICS & GYNECOLOGY

## 2022-01-20 RX ORDER — PNV NO.95/FERROUS FUM/FOLIC AC 28MG-0.8MG
1 TABLET ORAL DAILY
COMMUNITY
End: 2022-09-16

## 2022-01-20 ASSESSMENT — PAIN SCALES - GENERAL: PAINLEVEL: NO PAIN (0)

## 2022-01-20 ASSESSMENT — MIFFLIN-ST. JEOR: SCORE: 1456.6

## 2022-01-20 NOTE — PROGRESS NOTES
"  HPI:  Latasha Fisher is a 31 year old female  Patient's last menstrual period was 2021 (exact date). at Unknown, Estimated Date of Delivery: Data Unavailable.  She denies vaginal bleeding, vomiting and abdominal pain.  + cramping/cloudy urine and constipation.   No other c/o.  Quite smoking 1 year ago.  Works as a nurse.   Prior 's.  H/o retained placenta last delivery but no hemorrhage.  Recent influenza recovered.  + Covid vaccine.     Past Medical History:   Diagnosis Date     Acute pancreatitis child    post treuma, from a fall     Hepatitis remote    transient, probably from alcohol     Scoliosis      Tobacco abuse        Past Surgical History:   Procedure Laterality Date     ESOPHAGOSCOPY, GASTROSCOPY, DUODENOSCOPY (EGD), COMBINED N/A 6/10/2021    Procedure: upper endoscopy with biopsy;  Surgeon: Arturo Silva MD;  Location: [facility]     KNEE SURGERY       LAPAROSCOPIC APPENDECTOMY  2013    Procedure: LAPAROSCOPIC APPENDECTOMY;  LAPAROSCOPIC APPENDECTOMY;  Surgeon: Paula Roberts MD;  Location: [facility]       Allergies: Patient has no known allergies.     ROS:   Denies fever, wt loss   Neg /GI other than per HPI      EXAM:  Blood pressure 100/68, pulse 85, height 1.651 m (5' 5\"), weight 74.1 kg (163 lb 4.8 oz), last menstrual period 2021, SpO2 98 %, not currently breastfeeding.   BMI= Body mass index is 27.17 kg/m .  General - pleasant female in no acute distress.  Abdomen - soft, nontender, nondistended, no hepatosplenomegaly.  Pelvic - EG: normal adult female, BUS: within normal limits,Rectovaginal - deferred.    US:    Transvaginal:Yes  Yolk sac present:Yes  CRL:  22mm  FCA present:Yes  EGA 8w 6d  ALEJANDRO:cwd          ASSESSMENT/PLAN:  (Z32.01) Pregnancy test positive  (primary encounter diagnosis)  Comment: Viable IUP with concordant dates.    Plan: UA with Microscopic, Urine Culture, Urine Drugs        of Abuse Screen Panel 13, HIV Antigen Antibody         Combo, Rubella Antibody " IgG, Hepatitis B         surface antigen, Treponema Abs w Reflex to RPR         and Titer, CBC with platelets, Hepatitis C         antibody, ABO/Rh type and screen           Constipation measures discussed.   1st Trimester precautions and testing discussed.  New OB Labs ordered and exam scheduled.  Aneuploidy testing options discussed.  Patient has my card and phone number to call prn if problems in interim.    Abiodun Johnson MD

## 2022-01-20 NOTE — NURSING NOTE
"Chief Complaint   Patient presents with     Prenatal Care       Initial /68   Pulse 85   Ht 1.651 m (5' 5\")   Wt 74.1 kg (163 lb 4.8 oz)   LMP 11/17/2021 (Exact Date)   SpO2 98%   Breastfeeding No   BMI 27.17 kg/m   Estimated body mass index is 27.17 kg/m  as calculated from the following:    Height as of this encounter: 1.651 m (5' 5\").    Weight as of this encounter: 74.1 kg (163 lb 4.8 oz).  Medication Reconciliation: complete  KYLE COELHO LPN    "

## 2022-01-20 NOTE — LETTER
January 25, 2022          Latasha Fisher  [address]  [place] [address]        Dear Latasha,             Your New OB labs are all normal. If you have any questions please call [phone].        Resulted Orders   UA with Microscopic   Result Value Ref Range    Color Urine Light Yellow Colorless, Straw, Light Yellow, Yellow    Appearance Urine Slightly Cloudy (A) Clear    Glucose Urine Negative Negative mg/dL    Bilirubin Urine Negative Negative    Ketones Urine Negative Negative mg/dL    Specific Gravity Urine 1.013 1.003 - 1.035    Blood Urine Negative Negative    pH Urine 7.0 4.7 - 8.0    Protein Albumin Urine Negative Negative mg/dL    Urobilinogen Urine Normal Normal, 2.0 mg/dL    Nitrite Urine Negative Negative    Leukocyte Esterase Urine Negative Negative    Bacteria Urine Few (A) None Seen /HPF    WBC Clumps Urine Present (A) None Seen /HPF    Budding Yeast Urine Many (A) None Seen /HPF    Mucus Urine Present (A) None Seen /LPF    Sperm Urine Present (A) None Seen /HPF    RBC Urine 2 <=2 /HPF    WBC Urine 6 (H) <=5 /HPF    Squamous Epithelials Urine 1 <=1 /HPF   Urine Culture   Result Value Ref Range    Culture >100,000 CFU/mL Mixture of urogenital riccardo     Narrative    Multiple morphotypes present with no predominant organism.  Growth consistent with probable contamination during collection.  Suggest repeat specimen if clinically indicated.    Urine Drugs of Abuse Screen Panel 13   Result Value Ref Range    Cannabinoids (24-yrb-4-carboxy-9-THC) Not Detected Not Detected, Indeterminate      Comment:      Cutoff for a negative cannabinoid is 50 ng/mL or less.    Phencyclidine Not Detected Not Detected, Indeterminate      Comment:      Cutoff for a negative PCP is 25 ng/mL or less.    Cocaine (Benzoylecgonine) Not Detected Not Detected, Indeterminate      Comment:      Cutoff for a negative cocaine is 150 ng/ml or less.    Methamphetamine (d-Methamphetamine) Not Detected Not Detected, Indeterminate       Comment:      Cutoff for a negative methamphetamine is 500 ng/ml or less.    Opiates (Morphine) Not Detected Not Detected, Indeterminate      Comment:      Cutoff for a negative opiate is 100 ng/ml or less.    Amphetamine (d-Amphetamine) Not Detected Not Detected, Indeterminate      Comment:      Cutoff for a negative amphetamine is 500 ng/mL or less.    Benzodiazepines (Nordiazepam) Not Detected Not Detected, Indeterminate      Comment:      Cutoff for a negative benzodiazepine is 150 ng/ml or less.    Tricyclic Antidepressants (Desipramine) Not Detected Not Detected, Indeterminate      Comment:      Cutoff for a negative tricyclic antidepressant is 300 ng/ml or less.    Methadone Not Detected Not Detected, Indeterminate      Comment:      Cutoff for a negative methadone is 200 ng/ml or less.    Barbiturates (Butalbital) Not Detected Not Detected, Indeterminate      Comment:      Cutoff for a negative barbituate is 200 ng/ml or less.    Oxycodone Not Detected Not Detected, Indeterminate      Comment:      Cutoff for a negative oxycodone is 100 ng/mL or less.    Propoxyphene (Norpropoxyphene) Not Detected Not Detected, Indeterminate      Comment:      Cutoff for a negative propoxyphene is 300 ng/ml or less.    Buprenorphine Not Detected Not Detected, Indeterminate      Comment:      Cutoff for a negative buprenorphine is 10 ng/ml or less.   HIV Antigen Antibody Combo   Result Value Ref Range    HIV Antigen Antibody Combo Nonreactive Nonreactive      Comment:      HIV-1 p24 Ag & HIV-1/HIV-2 Ab Not Detected   Rubella Antibody IgG   Result Value Ref Range    Rubella Sue IgG Instrument Value 13.80 (H) <0.90 Index    Rubella Antibody IgG Positive (A) Negative      Comment:      Suggests previous exposure or immunization and probable immunity.   Hepatitis B surface antigen   Result Value Ref Range    Hepatitis B Surface Antigen Nonreactive Nonreactive   Treponema Abs w Reflex to RPR and Titer   Result Value Ref Range     Treponema Antibody Total Nonreactive Nonreactive   CBC with platelets   Result Value Ref Range    WBC Count 10.4 4.0 - 11.0 10e3/uL    RBC Count 4.38 3.80 - 5.20 10e6/uL    Hemoglobin 13.2 11.7 - 15.7 g/dL    Hematocrit 40.5 35.0 - 47.0 %    MCV 93 78 - 100 fL    MCH 30.1 26.5 - 33.0 pg    MCHC 32.6 31.5 - 36.5 g/dL    RDW 12.4 10.0 - 15.0 %    Platelet Count 313 150 - 450 10e3/uL   Hepatitis C antibody   Result Value Ref Range    Hepatitis C Antibody Nonreactive Nonreactive    Narrative    Assay performance characteristics have not been established for newborns, infants, and children.   Adult Type and Screen   Result Value Ref Range    ABO/RH(D) A POS     Antibody Screen Negative Negative    SPECIMEN EXPIRATION DATE [id number]          Sincerely,      Abiodun Johnson MD/ Mayra DOW

## 2022-01-22 LAB
BACTERIA UR CULT: NORMAL
T PALLIDUM AB SER QL: NONREACTIVE

## 2022-01-23 ENCOUNTER — HOSPITAL ENCOUNTER (EMERGENCY)
Facility: HOSPITAL | Age: 32
Discharge: HOME | End: 2022-01-23
Attending: STUDENT IN AN ORGANIZED HEALTH CARE EDUCATION/TRAINING PROGRAM | Admitting: STUDENT IN AN ORGANIZED HEALTH CARE EDUCATION/TRAINING PROGRAM
Payer: MEDICAID

## 2022-01-23 VITALS
DIASTOLIC BLOOD PRESSURE: 84 MMHG | OXYGEN SATURATION: 97 % | SYSTOLIC BLOOD PRESSURE: 124 MMHG | HEART RATE: 82 BPM | RESPIRATION RATE: 14 BRPM | TEMPERATURE: 98.6 F

## 2022-01-23 DIAGNOSIS — N39.0 URINARY TRACT INFECTION WITH HEMATURIA, SITE UNSPECIFIED: ICD-10-CM

## 2022-01-23 DIAGNOSIS — R31.9 URINARY TRACT INFECTION WITH HEMATURIA, SITE UNSPECIFIED: ICD-10-CM

## 2022-01-23 LAB
ALBUMIN UR-MCNC: 70 MG/DL
APPEARANCE UR: ABNORMAL
BACTERIA #/AREA URNS HPF: ABNORMAL /HPF
BILIRUB UR QL STRIP: NEGATIVE
COLOR UR AUTO: YELLOW
GLUCOSE UR STRIP-MCNC: NEGATIVE MG/DL
HGB UR QL STRIP: ABNORMAL
KETONES UR STRIP-MCNC: NEGATIVE MG/DL
LEUKOCYTE ESTERASE UR QL STRIP: ABNORMAL
NITRATE UR QL: NEGATIVE
PH UR STRIP: 7 [PH] (ref 4.7–8)
RBC URINE: >182 /HPF
SP GR UR STRIP: 1.02 (ref 1–1.03)
SQUAMOUS EPITHELIAL: 3 /HPF
UROBILINOGEN UR STRIP-MCNC: NORMAL MG/DL
WBC CLUMPS #/AREA URNS HPF: PRESENT /HPF
WBC URINE: >182 /HPF

## 2022-01-23 PROCEDURE — 250N000013 HC RX MED GY IP 250 OP 250 PS 637: Performed by: STUDENT IN AN ORGANIZED HEALTH CARE EDUCATION/TRAINING PROGRAM

## 2022-01-23 PROCEDURE — 99283 EMERGENCY DEPT VISIT LOW MDM: CPT

## 2022-01-23 PROCEDURE — 81001 URINALYSIS AUTO W/SCOPE: CPT | Performed by: STUDENT IN AN ORGANIZED HEALTH CARE EDUCATION/TRAINING PROGRAM

## 2022-01-23 PROCEDURE — 99283 EMERGENCY DEPT VISIT LOW MDM: CPT | Performed by: STUDENT IN AN ORGANIZED HEALTH CARE EDUCATION/TRAINING PROGRAM

## 2022-01-23 PROCEDURE — 87086 URINE CULTURE/COLONY COUNT: CPT | Performed by: STUDENT IN AN ORGANIZED HEALTH CARE EDUCATION/TRAINING PROGRAM

## 2022-01-23 RX ORDER — CEPHALEXIN 500 MG/1
500 CAPSULE ORAL 3 TIMES DAILY
Qty: 30 CAPSULE | Refills: 0 | Status: SHIPPED | OUTPATIENT
Start: 2022-01-23 | End: 2022-02-02

## 2022-01-23 RX ORDER — CEPHALEXIN 500 MG/1
500 CAPSULE ORAL ONCE
Status: COMPLETED | OUTPATIENT
Start: 2022-01-23 | End: 2022-01-23

## 2022-01-23 RX ORDER — PHENAZOPYRIDINE HYDROCHLORIDE 100 MG/1
100 TABLET, FILM COATED ORAL 3 TIMES DAILY
Qty: 9 TABLET | Refills: 0 | Status: SHIPPED | OUTPATIENT
Start: 2022-01-23 | End: 2022-01-26

## 2022-01-23 RX ADMIN — CEPHALEXIN 500 MG: 500 CAPSULE ORAL at 01:20

## 2022-01-23 ASSESSMENT — ENCOUNTER SYMPTOMS
HEMATURIA: 0
CARDIOVASCULAR NEGATIVE: 1
RESPIRATORY NEGATIVE: 1
PSYCHIATRIC NEGATIVE: 1
DYSURIA: 1
CONSTITUTIONAL NEGATIVE: 1
NEUROLOGICAL NEGATIVE: 1
FLANK PAIN: 0
GASTROINTESTINAL NEGATIVE: 1

## 2022-01-23 NOTE — DISCHARGE INSTRUCTIONS
- Please take the prescribed antibiotic and pyridium for your symptoms  - Please return to the Emergency Room if you do not improve, feel worse, or have any new or concerning symptoms. We would especially want to see you back if you experience a fever or worsening side/belly pain  - Please follow up with a primary care physician in 3-4 days to ensure you are improving

## 2022-01-23 NOTE — ED PROVIDER NOTES
History     Chief Complaint   Patient presents with     Urinary Retention     HPI  Latasha Fisher is a 31 year old female currently 9 weeks pregnant presenting with concern for UTI.  Patient has already seen her OB doctor and had an ultrasound confirming an intrauterine pregnancy.  She also had a yeast infection and is being treated with Diflucan.  She has a history of UTIs and has symptoms that feel the same as previous UTIs.  She has developed some intermittent frequent urination with retention.  She has dysuria and burning with urination.  She has no other new vaginal discharge.  No vaginal bleeding.  She does have some lower abdominal cramping.  She has no flank pain.  She has no fevers.  No history of immunosuppression.    Allergies:  No Known Allergies    Problem List:    Patient Active Problem List    Diagnosis Date Noted     Epigastric pain 05/20/2021     Priority: Medium     Added automatically from request for surgery [id number]       ACP (advance care planning) 09/27/2017     Priority: Medium     Advance Care Planning 9/27/2017: ACP Review of Chart / Resources Provided:  Reviewed chart for advance care plan.  Latasha Fisher has no plan or code status on file. Discussed available resources and provided with information.   Added by Ania Benitez             Introital dyspareunia 07/18/2017     Priority: Medium     Retained complete placenta 01/04/2016     Priority: Medium     Removed manually and currettaged. No excess blood loss noted       Papanicolaou smear of cervix with low grade squamous intraepithelial lesion (LGSIL) 08/25/2015     Priority: Medium     5/15.  [facility].   LGSIL, + HR HPV.  F/u pap pp.       Polycystic kidney disease 05/27/2015     Priority: Medium     Autosomal dominant polycystic kidney disease 10/03/2013     Priority: Medium     Status post laparoscopic appendectomy 06/10/2013     Priority: Medium     Contraceptive management 07/31/2012     Priority: Medium     Encounter for  surgical follow-up care 2008     Priority: Medium     Overview:   IMO Update 10/11       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 2008     Priority: Medium     Pain in joint, lower leg 2008     Priority: Medium     Notalgia 2006     Priority: Medium     Overview:   IMO Update 10/11       Pes planus 2006     Priority: Medium     Overview:   IMO Update 10 2016       Scoliosis (and kyphoscoliosis), idiopathic 2005     Priority: Medium     Overview:   Mild  Replacing diagnoses that were inactivated after the 10/1/2021 regulatory import.          Past Medical History:    Past Medical History:   Diagnosis Date     Acute pancreatitis child     Hepatitis remote     Scoliosis      Tobacco abuse        Past Surgical History:    Past Surgical History:   Procedure Laterality Date     ESOPHAGOSCOPY, GASTROSCOPY, DUODENOSCOPY (EGD), COMBINED N/A 6/10/2021    Procedure: upper endoscopy with biopsy;  Surgeon: Arturo Silva MD;  Location: [facility]     KNEE SURGERY       LAPAROSCOPIC APPENDECTOMY  2013    Procedure: LAPAROSCOPIC APPENDECTOMY;  LAPAROSCOPIC APPENDECTOMY;  Surgeon: Paula Roberts MD;  Location: [facility]       Family History:    Family History   Problem Relation Age of Onset     Thyroid Disease Mother      Diabetes Father      Asthma Sister      Attention Deficit Disorder Brother        Social History:  Marital Status:  Single [1]  Social History     Tobacco Use     Smoking status: Former Smoker     Packs/day: 0.50     Years: 14.00     Pack years: 7.00     Types: Cigarettes     Start date: 2006     Quit date: 2020     Years since quittin.0     Smokeless tobacco: Never Used   Substance Use Topics     Alcohol use: No     Alcohol/week: 5.0 standard drinks     Types: 6 Cans of beer per week     Comment: occasionally when she goes out' past hx heavier consumption     Drug use: No        Medications:    cephALEXin (KEFLEX) 500 MG capsule  miconazole (MICATIN) 100 MG  vaginal suppository  phenazopyridine (PYRIDIUM) 100 MG tablet  Prenatal Vit-Fe Fumarate-FA (PRENATAL VITAMINS) 28-0.8 MG TABS      Review of Systems   Constitutional: Negative.    HENT: Negative.    Respiratory: Negative.    Cardiovascular: Negative.    Gastrointestinal: Negative.    Genitourinary: Positive for dysuria and urgency. Negative for flank pain, hematuria, vaginal bleeding and vaginal pain.   Skin: Negative.    Neurological: Negative.    Psychiatric/Behavioral: Negative.    All other systems reviewed and are negative.      Physical Exam   BP: 124/84  Pulse: 82  Temp: 98.6  F (37  C)  Resp: 14  SpO2: 97 %      Physical Exam  Vitals and nursing note reviewed.   Constitutional:       General: She is not in acute distress.     Appearance: Normal appearance. She is not ill-appearing or toxic-appearing.   HENT:      Head: Normocephalic and atraumatic.      Right Ear: External ear normal.      Left Ear: External ear normal.      Nose: Nose normal.   Eyes:      Extraocular Movements: Extraocular movements intact.      Pupils: Pupils are equal, round, and reactive to light.   Cardiovascular:      Pulses: Normal pulses.   Pulmonary:      Effort: Pulmonary effort is normal.   Abdominal:      General: Abdomen is flat.      Palpations: Abdomen is soft.      Tenderness: There is no abdominal tenderness. There is no right CVA tenderness, left CVA tenderness or guarding.   Musculoskeletal:         General: Normal range of motion.      Cervical back: Normal range of motion.   Skin:     General: Skin is warm and dry.      Capillary Refill: Capillary refill takes less than 2 seconds.   Neurological:      General: No focal deficit present.      Mental Status: She is alert and oriented to person, place, and time.   Psychiatric:         Mood and Affect: Mood normal.         ED Course           Procedures              Results for orders placed or performed during the hospital encounter of 01/23/22 (from the past 24 hour(s))    UA with Microscopic reflex to Culture    Specimen: Urine, Midstream   Result Value Ref Range    Color Urine Yellow Colorless, Straw, Light Yellow, Yellow    Appearance Urine Cloudy (A) Clear    Glucose Urine Negative Negative mg/dL    Bilirubin Urine Negative Negative    Ketones Urine Negative Negative mg/dL    Specific Gravity Urine 1.017 1.003 - 1.035    Blood Urine Large (A) Negative    pH Urine 7.0 4.7 - 8.0    Protein Albumin Urine 70  (A) Negative mg/dL    Urobilinogen Urine Normal Normal, 2.0 mg/dL    Nitrite Urine Negative Negative    Leukocyte Esterase Urine Large (A) Negative    Bacteria Urine Few (A) None Seen /HPF    WBC Clumps Urine Present (A) None Seen /HPF    RBC Urine >182 (H) <=2 /HPF    WBC Urine >182 (H) <=5 /HPF    Squamous Epithelials Urine 3 (H) <=1 /HPF    Narrative    Urine Culture ordered based on laboratory criteria       Medications   cephALEXin (KEFLEX) capsule 500 mg (has no administration in time range)       Assessments & Plan (with Medical Decision Making)     I have reviewed the nursing notes.    Burning with urination. Pregnant. Not consistent with pyelonephritis. No hx of immunosuppresion or renal dysfunction. Plan on UA and then treatment pending results. No vaginal bleeding or other new symptoms. Being treated for yeast infection currently.    On chart review, her UA showed WBCs, bacteria, but culture showed urogenital riccardo. Given that she is symptomatic, has worsening UA, will plan to treat with keflex given that she has symptomatic bacteruria with no squamous cells. Will need close follow-up.    Findings were discussed with the patient. Additional verbal instructions were discussed with the patient as well. Instructed to follow up with a primary care provider within 4 days. Also discussed specific warning signs and instructed to return to the ED if there are any concerns. Patient voiced understanding of instructions, questions were answered and the patient was discharged  home in stable condition.    I have reviewed the findings, diagnosis, plan and need for follow up with the patient.    New Prescriptions    CEPHALEXIN (KEFLEX) 500 MG CAPSULE    Take 1 capsule (500 mg) by mouth 3 times daily for 10 days    PHENAZOPYRIDINE (PYRIDIUM) 100 MG TABLET    Take 1 tablet (100 mg) by mouth 3 times daily for 3 days       Final diagnoses:   Urinary tract infection with hematuria, site unspecified       1/23/2022   [facility]     Kalia King MD  01/23/22 0113

## 2022-01-23 NOTE — ED NOTES
Pt reports that she is pregnant and has been seen by her provider and had an ultrasound. Pt reports that she is currently being treated for yeast infection, using over the counter Monistat. Pt reports that she has used in for two nights now and there is no improvement in symptoms yet. Urinary symptoms started Sat.

## 2022-01-24 LAB
HBV SURFACE AG SERPL QL IA: NONREACTIVE
HCV AB SERPL QL IA: NONREACTIVE
HIV 1+2 AB+HIV1 P24 AG SERPL QL IA: NONREACTIVE
RUBV IGG SERPL QL IA: 13.8 INDEX
RUBV IGG SERPL QL IA: POSITIVE

## 2022-01-25 LAB — BACTERIA UR CULT: ABNORMAL

## 2022-01-28 ENCOUNTER — TELEPHONE (OUTPATIENT)
Dept: OBGYN | Facility: OTHER | Age: 32
End: 2022-01-28
Payer: MEDICAID

## 2022-01-28 NOTE — TELEPHONE ENCOUNTER
Pt is 10 weeks pregnant and states she had sex yesterday and had slight bleeding after. Today still has light bleeding with small clots. Denies pain or cramping. Please advise

## 2022-01-28 NOTE — TELEPHONE ENCOUNTER
Some people will have some spotting/bleeding after sex in pregnancy.  If no other pain/cramping and it is resolving then would recommend no further sex until next appt.  If coninued/recurrent bleeding or significant pain/cramping over weekend then would got to emergency department for evaluation.

## 2022-02-07 ENCOUNTER — PRENATAL OFFICE VISIT (OUTPATIENT)
Dept: OBGYN | Facility: OTHER | Age: 32
End: 2022-02-07
Attending: OBSTETRICS & GYNECOLOGY
Payer: MEDICAID

## 2022-02-07 VITALS — DIASTOLIC BLOOD PRESSURE: 58 MMHG | BODY MASS INDEX: 27.29 KG/M2 | SYSTOLIC BLOOD PRESSURE: 90 MMHG | WEIGHT: 164 LBS

## 2022-02-07 DIAGNOSIS — O21.9 NAUSEA AND VOMITING DURING PREGNANCY: ICD-10-CM

## 2022-02-07 DIAGNOSIS — Z34.91 ENCOUNTER FOR SUPERVISION OF LOW-RISK PREGNANCY IN FIRST TRIMESTER: ICD-10-CM

## 2022-02-07 PROBLEM — N94.11 INTROITAL DYSPAREUNIA: Status: RESOLVED | Noted: 2017-07-18 | Resolved: 2022-02-07

## 2022-02-07 PROBLEM — R10.13 EPIGASTRIC PAIN: Status: RESOLVED | Noted: 2021-05-20 | Resolved: 2022-02-07

## 2022-02-07 LAB
ALBUMIN UR-MCNC: NEGATIVE MG/DL
APPEARANCE UR: CLEAR
BILIRUB UR QL STRIP: NEGATIVE
COLOR UR AUTO: YELLOW
GLUCOSE UR STRIP-MCNC: NEGATIVE MG/DL
HGB UR QL STRIP: NEGATIVE
KETONES UR STRIP-MCNC: ABNORMAL MG/DL
LEUKOCYTE ESTERASE UR QL STRIP: NEGATIVE
MUCOUS THREADS #/AREA URNS LPF: PRESENT /LPF
NITRATE UR QL: NEGATIVE
PH UR STRIP: 7 [PH] (ref 4.7–8)
RBC URINE: 1 /HPF
SP GR UR STRIP: 1.02 (ref 1–1.03)
SQUAMOUS EPITHELIAL: 1 /HPF
UROBILINOGEN UR STRIP-MCNC: 2 MG/DL
WBC URINE: <1 /HPF

## 2022-02-07 PROCEDURE — G0463 HOSPITAL OUTPT CLINIC VISIT: HCPCS

## 2022-02-07 PROCEDURE — 81001 URINALYSIS AUTO W/SCOPE: CPT | Mod: ZL | Performed by: OBSTETRICS & GYNECOLOGY

## 2022-02-07 PROCEDURE — 99207 PR COMPLICATED OB VISIT: CPT | Performed by: OBSTETRICS & GYNECOLOGY

## 2022-02-07 RX ORDER — ONDANSETRON 4 MG/1
4 TABLET, FILM COATED ORAL EVERY 8 HOURS PRN
Qty: 40 TABLET | Refills: 3 | Status: SHIPPED | OUTPATIENT
Start: 2022-02-07 | End: 2022-09-16

## 2022-02-07 ASSESSMENT — PAIN SCALES - GENERAL: PAINLEVEL: MODERATE PAIN (4)

## 2022-02-07 NOTE — PROGRESS NOTES
SUBJECTIVE  Latasha Fisher is a 31 year old  with Patient's last menstrual period was 2021. and her Estimated Date of Delivery: Aug 26, 2022 determined by LMP. Gestational age is 11w3d. She presents for acute OB visit.    She complains of worsening nausea, now with emesis several times per day. She is requesting nausea medication. She just finished antibiotics for UTI with E. coli. She denies any difficulty urinating, dysuria, hematuria, or flank pain. She does noticed that her urine is darker than usual. She isn't feeling fetal movement yet. She denies leaking of fluid, vaginal bleeding, cramping or pelvic pressure. She denies abnormal vaginal discharge, difficulty urinating, headache or vision changes, fever or chills. She denies depression symptoms.    OBJECTIVE  BP 90/58   Wt 74.4 kg (164 lb)   LMP 2021   BMI 27.29 kg/m      General:  Well-developed well-nourished female in no apparent distress. Alert and oriented x3.  Abdomen: Soft, non tender, positive bowel sounds. Fundal height below umbilicus.  Fetal heart tones auscultated at 160.  Cervix: deferred  Extremities:  No clubbing, cyanosis, or edema. Nontender bilaterally.    DIAGNOSTICS  2022 UCx: E.Coli    ASSESSMENT / PLAN  1 Intrauterine pregnancy at 11w3d  2 Nausea and vomiting in pregnancy  3 Recent E. coli UTI treated with antibiotic    - Problem list reviewed and updated.  - Symptomatic relief measures for nausea and vomiting in pregnancy reviewed with the patient.  - I gave the patient prescription for Zofran with instructions for use.  - I recommend checking a urinalysis with reflex urine culture.  - I encouraged hydration.  - I reviewed routine obstetrical precautions, recommendations and instructions with the patient.  - I reviewed miscarriage precautions with the patient.  - I discussed routine healthy diet recommendations, exercise recommendations and weight gain recommendations in pregnancy.  - Follow up in 3 weeks as  scheduled or be seen sooner as needed.    Wilmer Dawson MD  Obstetrics and Gynecology

## 2022-02-07 NOTE — NURSING NOTE
"Chief Complaint   Patient presents with     Prenatal Care       Initial BP 90/58   Wt 74.4 kg (164 lb)   LMP 11/19/2021   BMI 27.29 kg/m   Estimated body mass index is 27.29 kg/m  as calculated from the following:    Height as of 1/20/22: 1.651 m (5' 5\").    Weight as of this encounter: 74.4 kg (164 lb).  Medication Reconciliation: complete  Radha Abdul LPN    "

## 2022-02-22 ENCOUNTER — APPOINTMENT (OUTPATIENT)
Dept: LAB | Facility: OTHER | Age: 32
End: 2022-02-22
Payer: MEDICAID

## 2022-02-22 DIAGNOSIS — Z34.82 ENCOUNTER FOR SUPERVISION OF OTHER NORMAL PREGNANCY IN SECOND TRIMESTER: Primary | ICD-10-CM

## 2022-02-22 PROCEDURE — 36415 COLL VENOUS BLD VENIPUNCTURE: CPT | Mod: ZL | Performed by: NURSE PRACTITIONER

## 2022-02-25 ENCOUNTER — PRENATAL OFFICE VISIT (OUTPATIENT)
Dept: OBGYN | Facility: OTHER | Age: 32
End: 2022-02-25
Attending: NURSE PRACTITIONER
Payer: MEDICAID

## 2022-02-25 VITALS
HEIGHT: 65 IN | DIASTOLIC BLOOD PRESSURE: 64 MMHG | SYSTOLIC BLOOD PRESSURE: 102 MMHG | WEIGHT: 164.7 LBS | BODY MASS INDEX: 27.44 KG/M2 | HEART RATE: 81 BPM | OXYGEN SATURATION: 99 %

## 2022-02-25 DIAGNOSIS — Z34.92 ENCOUNTER FOR SUPERVISION OF LOW-RISK PREGNANCY IN SECOND TRIMESTER: ICD-10-CM

## 2022-02-25 DIAGNOSIS — Z12.4 SCREENING FOR CERVICAL CANCER: Primary | ICD-10-CM

## 2022-02-25 DIAGNOSIS — F32.0 CURRENT MILD EPISODE OF MAJOR DEPRESSIVE DISORDER WITHOUT PRIOR EPISODE (H): ICD-10-CM

## 2022-02-25 LAB
T4 FREE SERPL-MCNC: 0.96 NG/DL (ref 0.76–1.46)
TSH SERPL DL<=0.005 MIU/L-ACNC: 1.64 MU/L (ref 0.4–4)

## 2022-02-25 PROCEDURE — 99207 PR PRENATAL VISIT: CPT | Performed by: NURSE PRACTITIONER

## 2022-02-25 PROCEDURE — G0463 HOSPITAL OUTPT CLINIC VISIT: HCPCS | Mod: 25 | Performed by: COUNSELOR

## 2022-02-25 PROCEDURE — G0145 SCR C/V CYTO,THINLAYER,RESCR: HCPCS | Mod: ZL | Performed by: NURSE PRACTITIONER

## 2022-02-25 PROCEDURE — 87624 HPV HI-RISK TYP POOLED RSLT: CPT | Mod: ZL | Performed by: NURSE PRACTITIONER

## 2022-02-25 PROCEDURE — 36415 COLL VENOUS BLD VENIPUNCTURE: CPT | Mod: ZL | Performed by: NURSE PRACTITIONER

## 2022-02-25 PROCEDURE — 82306 VITAMIN D 25 HYDROXY: CPT | Mod: ZL | Performed by: NURSE PRACTITIONER

## 2022-02-25 PROCEDURE — 87491 CHLMYD TRACH DNA AMP PROBE: CPT | Mod: ZL | Performed by: NURSE PRACTITIONER

## 2022-02-25 PROCEDURE — 84443 ASSAY THYROID STIM HORMONE: CPT | Mod: ZL | Performed by: NURSE PRACTITIONER

## 2022-02-25 PROCEDURE — 84439 ASSAY OF FREE THYROXINE: CPT | Mod: ZL | Performed by: NURSE PRACTITIONER

## 2022-02-25 PROCEDURE — 87591 N.GONORRHOEAE DNA AMP PROB: CPT | Mod: ZL | Performed by: NURSE PRACTITIONER

## 2022-02-25 PROCEDURE — G0463 HOSPITAL OUTPT CLINIC VISIT: HCPCS | Performed by: COUNSELOR

## 2022-02-25 ASSESSMENT — PAIN SCALES - GENERAL: PAINLEVEL: NO PAIN (0)

## 2022-02-25 ASSESSMENT — PATIENT HEALTH QUESTIONNAIRE - PHQ9: SUM OF ALL RESPONSES TO PHQ QUESTIONS 1-9: 10

## 2022-02-25 NOTE — PROGRESS NOTES
"  HPI:  Latasha Fisher is a 31 year old female Patient's last menstrual period was 11/19/2021. at 14w0d, Estimated Date of Delivery: Aug 26, 2022.  She denies vaginal bleeding, vomiting and abdominal pain. Does note some signs of depression.  Tearful, fatigue, hopeless.  PHQ9 score of 10.  No hx of depression or PPD. Works as a nurse at [employer].  No other c/o.    Past Medical History:   Diagnosis Date     Acute pancreatitis child    post treuma, from a fall     Hepatitis remote    transient, probably from alcohol     Introital dyspareunia 7/18/2017     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 6/4/2008     Papanicolaou smear of cervix with low grade squamous intraepithelial lesion (LGSIL) 8/25/2015    5/15.  [facility].   LGSIL, + HR HPV. F/u pap pp.      Polycystic kidney disease 5/27/2015     Retained complete placenta 1/4/2016    Removed manually and currettaged. No excess blood loss noted      Scoliosis      Scoliosis (and kyphoscoliosis), idiopathic 7/26/2005    Overview:  Mild  Replacing diagnoses that were inactivated after the 10/1/2021 regulatory import.     Status post laparoscopic appendectomy 6/10/2013     Tobacco abuse        Past Surgical History:   Procedure Laterality Date     ESOPHAGOSCOPY, GASTROSCOPY, DUODENOSCOPY (EGD), COMBINED N/A 6/10/2021    Procedure: upper endoscopy with biopsy;  Surgeon: Arturo Silva MD;  Location: [facility]     KNEE SURGERY       LAPAROSCOPIC APPENDECTOMY  4/23/2013    Procedure: LAPAROSCOPIC APPENDECTOMY;  LAPAROSCOPIC APPENDECTOMY;  Surgeon: Paula Roberts MD;  Location: [facility]       Allergies: Patient has no known allergies.     EXAM:  Blood pressure 102/64, pulse 81, height 1.651 m (5' 5\"), weight 74.7 kg (164 lb 11.2 oz), last menstrual period 11/19/2021, SpO2 99 %, not currently breastfeeding.   BMI= Body mass index is 27.41 kg/m .  General - pleasant female in no acute distress.  Neck - supple without lymphadenopathy or thyromegaly.  Lungs - clear to " auscultation bilaterally.  Heart - regular rate and rhythm without murmur.  Abdomen - soft, nontender, nondistended, no hepatosplenomegaly.  Pelvic - EG: normal adult female, BUS: within normal limits, Vagina: well rugated, no discharge, Cervix: no lesions or CMT, closed/long Uterus: gravid, consistant with dates, mobile, Adnexae: no masses or tenderness.  Rectovaginal - deferred.  Musculoskeletal - no gross deformities.  Neurological - normal strength, sensation, and mental status.    Doptones were 156    ASSESSMENT/PLAN:  (Z12.4) Screening for cervical cancer  (primary encounter diagnosis)  Comment:   Plan: A pap thin layer screen with  HPV - recommended        age 30 - 65 years (select HPV order below)            (Z34.92) Encounter for supervision of low-risk pregnancy in second trimester  Comment: new [place] physical and teaching today  Plan: GC/Chlamydia by PCR - [facility]        Return in 4 weeks.    (F32.A) Depression  Comment:   Plan: Vitamin D Deficiency, TSH, T4, free            Weight gain and exercise during pregnancy was discussed at today's visit.  The patient will return to clinic in 4 weeks for continued prenatal care.

## 2022-02-25 NOTE — PROGRESS NOTES
Have you had or do you currently have:    - Diabetes? N  - Hypertension? N  - Heart disease, mitral valve prolapse, or rheumatic fever?  N  - An autoimmune disease such as lupus or rheumatoid arthritis?  N  - Kidney disease, urinary tract infection?  Y  - Epilepsy, seizures, or spells?  N  - Migraine headaches?  N  - Any other neurological problems?  N  - Have you ever been treated for depression?  N  - Are you having problems with crying spells or loss of self-esteem?  Y  - Have you ever required psychiatric care?  N  - Have you ever had hepatitis, liver disease, or jaundice?  N  - Have you ever been treated for blood clots in your veins, deep vein thrombosis, inflammation in the veins, thrombosis, phelbitis, pulmonary embolism or varicosities?  N  - Have you had excessive bleeding after surgery or dental work?  N  - Do you bleed more than other women after a cut or scratch?  N  - Do you have a history or anemia?  N  - Have you ever had thyroid problems or take thyroid medication?  N  - Do you have any endocrine problems?  N  - Have you every been in a major accident or suffered serious trauma?  Y  - Within the last year, has anyone hit, slapped, kicked, or otherwise hurt you?  N  - In the last year, has anyone forced you to have sex when you didn't want to?  N  - Have you every received a blood transfusion?  N  - Would you refuse a blood transfusion if a doctor judged it to be medically necessary?  N  - Does anyone in your home smoke? N  - Do you use tobacco products?  N  - Do you drink beer, wine, or hard liquor?  N  - Do you use any of the following: marijuana, speed, cocaine, heroin, hallucinogens, or other drugs?  N  - Is your blood type RH negative?  N  - Have you ever had asthma?  Y  - Have you ever had tuberculosis?  N  - Do you have any allergies to drugs or over-the-counter medications?  N  - Allergies: dust mites, aspartame, ethanol, venlafaxine hydrochloride, sertraline?  N  - Have you had any breast  problems?  N  - Have you ever breast-fed?  Y  - Have you had any gynecological surgical procedures such as cervical conization, LEEP, laser treatment, cryosurgery of the cervix, or a dilatation and curettage, etc?  N  - Have you ever had any other surgical procedures?  Y  - Have you ever had any anesthetic complications?  N  - Have you ever had an abnormal pap smear?  Y  - Do you have a history of abnormalities of the uterus? N  - Did your mother take AINSLEY or any other hormones when she was pregnant with you?  N  - Did it take you more than one year to become pregnant?  N  - Have you ever been evaluated or treated for infertility?  N  - Is there a history of medical problems in your family, which you feel might adversely affect your health or pregnancy?  N  - Do you have any other problems we have not asked about which you feel may be important to this pregnancy?  N    Symptoms since last menstrual period  - Do you currently have any of the following symptoms: abdominal pain, blood in the stool or urine, chest pain, shortness of breath, coughing or vomiting up blood, you heart is racing or skipping beats, nausea and vomiting, pain on urination, or vaginal discharge or bleeding?  Y    Genetic screening  Has the patient, baby's father, or anyone in either family had:  - Thalassemia (Italian, Greek, Mediterranean, or  background only) and an MCV result less than 80?  N  - Neural tube defect such as meningomyelocele, spina bifida, or anencephaly?  N  - Congential heart defect?  N  - Down's syndrome?  N  - Travis-Sachs disease ( Adventism, Cajun, Turkish-[place])?  N  - Sickle cell disease or trait () ?  N  - Hemophilia or other inherited problems of blood?  N  - Muscular dystrophy?  N  - Cystic fibrosis?  N  - Island's chorea?  N  - Mental retardation/autism?  N   If yes, was the person tested for Fragile X?  N  - Any other inherited genetic or chromosomal disorder?  N  - Maternal metabolic disorder (e.g.  insulin- dependent diabetes, PKU)?  N  - A child with birth defects not listed above?  N  - Recurrent pregnancy loss, or a stillbirth?  N  - Has the patient had any medications/street drugs/alcohol since her last menstrual period?  N  - Does the patient or baby's father have any other genetic risk?  N    Infection history  - Have you ever been treated for tuberculosis?  N  - Have you every had a positive skin test for tuberculosis?  N  - Do you live with someone who has tuberculosis?  N  - Have you ever been exposed to tuberculosis?  N  - Do you have genital herpes?  N  - Does your partner have genital herpes?  N  - Have you had a rash or viral illness since your last period?  N  - Have you ever had gonorrhea, chlamydia, syphilis, venereal warts, trichomoniasis, pelvic inflammatory disease, or any other sexually transmitted disease?  N  - Have you had chicken pox?  N  - Have you been vaccinated against chicken pox?  Y  - Have you had any other infectious diseases?  N

## 2022-02-25 NOTE — NURSING NOTE
"Chief Complaint   Patient presents with     Prenatal Care     New OB weeks 0 days        Initial /64 (BP Location: Left arm, Patient Position: Sitting, Cuff Size: Adult Regular)   Pulse 81   Ht 1.651 m (5' 5\")   Wt 74.7 kg (164 lb 11.2 oz)   LMP 11/19/2021   SpO2 99%   BMI 27.41 kg/m   Estimated body mass index is 27.41 kg/m  as calculated from the following:    Height as of this encounter: 1.651 m (5' 5\").    Weight as of this encounter: 74.7 kg (164 lb 11.2 oz).  Medication Reconciliation: complete     Amparo Espitia LPN    "

## 2022-02-26 LAB
C TRACH DNA SPEC QL PROBE+SIG AMP: NEGATIVE
DEPRECATED CALCIDIOL+CALCIFEROL SERPL-MC: 29 UG/L (ref 20–75)
N GONORRHOEA DNA SPEC QL NAA+PROBE: NEGATIVE

## 2022-03-01 LAB — SCANNED LAB RESULT: NORMAL

## 2022-03-02 LAB
BKR LAB AP GYN ADEQUACY: ABNORMAL
BKR LAB AP GYN INTERPRETATION: ABNORMAL
BKR LAB AP HPV REFLEX: ABNORMAL
BKR LAB AP PREVIOUS ABNORMAL: ABNORMAL
PATH REPORT.COMMENTS IMP SPEC: ABNORMAL
PATH REPORT.COMMENTS IMP SPEC: ABNORMAL
PATH REPORT.RELEVANT HX SPEC: ABNORMAL

## 2022-03-02 PROCEDURE — G0124 SCREEN C/V THIN LAYER BY MD: HCPCS | Performed by: PATHOLOGY

## 2022-03-04 LAB
HUMAN PAPILLOMA VIRUS 16 DNA: NEGATIVE
HUMAN PAPILLOMA VIRUS 18 DNA: NEGATIVE
HUMAN PAPILLOMA VIRUS FINAL DIAGNOSIS: ABNORMAL
HUMAN PAPILLOMA VIRUS OTHER HR: POSITIVE

## 2022-03-25 ENCOUNTER — PRENATAL OFFICE VISIT (OUTPATIENT)
Dept: OBGYN | Facility: OTHER | Age: 32
End: 2022-03-25
Attending: OBSTETRICS & GYNECOLOGY
Payer: MEDICAID

## 2022-03-25 VITALS
SYSTOLIC BLOOD PRESSURE: 121 MMHG | OXYGEN SATURATION: 100 % | HEART RATE: 96 BPM | DIASTOLIC BLOOD PRESSURE: 76 MMHG | BODY MASS INDEX: 27.16 KG/M2 | WEIGHT: 163 LBS | HEIGHT: 65 IN

## 2022-03-25 DIAGNOSIS — Z34.92 ENCOUNTER FOR SUPERVISION OF LOW-RISK PREGNANCY IN SECOND TRIMESTER: Primary | ICD-10-CM

## 2022-03-25 PROCEDURE — 99207 PR PRENATAL VISIT: CPT | Performed by: OBSTETRICS & GYNECOLOGY

## 2022-03-25 PROCEDURE — G0463 HOSPITAL OUTPT CLINIC VISIT: HCPCS | Performed by: COUNSELOR

## 2022-03-25 ASSESSMENT — PAIN SCALES - GENERAL: PAINLEVEL: NO PAIN (0)

## 2022-03-25 NOTE — NURSING NOTE
"Chief Complaint   Patient presents with     Prenatal Care     18 weeks 0 days     Colposcopy       Initial /76 (BP Location: Left arm, Patient Position: Sitting, Cuff Size: Adult Regular)   Pulse 96   Ht 1.651 m (5' 5\")   Wt 73.9 kg (163 lb)   LMP 11/19/2021   SpO2 100%   BMI 27.12 kg/m   Estimated body mass index is 27.12 kg/m  as calculated from the following:    Height as of this encounter: 1.651 m (5' 5\").    Weight as of this encounter: 73.9 kg (163 lb).  Medication Reconciliation: complete     Amparo Espitia, VICTOR M    "

## 2022-03-29 NOTE — PROGRESS NOTES
Doing well.  No concerns today.  Discussed nml NIPT screening  US for full fetal anatomical survey ordered.  Return to clinic in 4 weeks    Abiodun Johnson MD  3/29/2022

## 2022-04-07 ENCOUNTER — HOSPITAL ENCOUNTER (OUTPATIENT)
Dept: ULTRASOUND IMAGING | Facility: HOSPITAL | Age: 32
Discharge: HOME | End: 2022-04-07
Attending: NURSE PRACTITIONER | Admitting: NURSE PRACTITIONER
Payer: MEDICAID

## 2022-04-07 DIAGNOSIS — Z34.92 ENCOUNTER FOR SUPERVISION OF LOW-RISK PREGNANCY IN SECOND TRIMESTER: ICD-10-CM

## 2022-04-07 PROCEDURE — 76805 OB US >/= 14 WKS SNGL FETUS: CPT

## 2022-05-03 ENCOUNTER — PRENATAL OFFICE VISIT (OUTPATIENT)
Dept: OBGYN | Facility: OTHER | Age: 32
End: 2022-05-03
Attending: OBSTETRICS & GYNECOLOGY
Payer: MEDICAID

## 2022-05-03 VITALS
HEIGHT: 65 IN | SYSTOLIC BLOOD PRESSURE: 100 MMHG | HEART RATE: 86 BPM | WEIGHT: 166 LBS | OXYGEN SATURATION: 99 % | BODY MASS INDEX: 27.66 KG/M2 | DIASTOLIC BLOOD PRESSURE: 60 MMHG

## 2022-05-03 DIAGNOSIS — N89.8 VAGINAL DISCHARGE: Primary | ICD-10-CM

## 2022-05-03 DIAGNOSIS — R87.612 PAPANICOLAOU SMEAR OF CERVIX WITH LOW GRADE SQUAMOUS INTRAEPITHELIAL LESION (LGSIL): ICD-10-CM

## 2022-05-03 DIAGNOSIS — Z34.92 ENCOUNTER FOR SUPERVISION OF LOW-RISK PREGNANCY IN SECOND TRIMESTER: ICD-10-CM

## 2022-05-03 LAB
CLUE CELLS: NORMAL
TRICHOMONAS, WET PREP: NORMAL
WBC'S/HIGH POWER FIELD, WET PREP: NORMAL
YEAST, WET PREP: NORMAL

## 2022-05-03 PROCEDURE — 99207 PR COMPLICATED OB VISIT: CPT | Mod: 25 | Performed by: OBSTETRICS & GYNECOLOGY

## 2022-05-03 PROCEDURE — 87210 SMEAR WET MOUNT SALINE/INK: CPT | Mod: ZL | Performed by: OBSTETRICS & GYNECOLOGY

## 2022-05-03 PROCEDURE — G0463 HOSPITAL OUTPT CLINIC VISIT: HCPCS | Mod: 25

## 2022-05-03 PROCEDURE — 57452 EXAM OF CERVIX W/SCOPE: CPT | Performed by: OBSTETRICS & GYNECOLOGY

## 2022-05-03 ASSESSMENT — PAIN SCALES - GENERAL: PAINLEVEL: NO PAIN (0)

## 2022-05-03 NOTE — NURSING NOTE
"Chief Complaint   Patient presents with     Prenatal Care     23 4/7     Procedure     Colpo       Initial /60 (BP Location: Left arm, Patient Position: Chair, Cuff Size: Adult Regular)   Pulse 86   Ht 1.651 m (5' 5\")   Wt 75.3 kg (166 lb)   LMP 11/19/2021   SpO2 99%   BMI 27.62 kg/m   Estimated body mass index is 27.62 kg/m  as calculated from the following:    Height as of this encounter: 1.651 m (5' 5\").    Weight as of this encounter: 75.3 kg (166 lb).  Medication Reconciliation: complete  BRII GIORDANO LPN    "

## 2022-05-04 NOTE — PROGRESS NOTES
"Latasha Fisher is a 31 year old female at 23 weeks pregnancy  who presents for abnormal pap smear evaluation. .     Pap smear 2 months ago showed: LGSIL with high risk HPV present: (other). The prior pap showed nml .   This will be her initial colposcopy.    Patient's last menstrual period was 11/19/2021.     Past GYN history:  No STD history  Prior cervical/vaginal disease: Normal exam without visible pathology.  Prior cervical treatment: no treatment.  Tobacco use:No    PMH, Fam Hx, Soc Hx Reviewed.    ROS:  Neg GI/    PROCEDURE:  Before the procedure, it was ensured that the patient was educated regarding the nature of her findings to date, the implications, and what was to be done. She has been made aware of the role of HPV, the natural history of infection, ways to minimize her future risk, the effect of HPV on the cervix, and treatment options available should they be indicated.  The details of the colposcopic procedure were reviewed.  All questions were answered before proceeding, and informed consent was therefore obtained.    Speculum placed in vagina and excellent visualization of cervix   acheived, cervix swabbed x 3 with acetic acid solution.    FINDINGS:  EXAM:  Blood pressure 100/60, pulse 86, height 1.651 m (5' 5\"), weight 75.3 kg (166 lb), last menstrual period 11/19/2021, SpO2 99 %, not currently breastfeeding.  BMI= Body mass index is 27.62 kg/m .  Patient's last menstrual period was 11/19/2021.  General - pleasant female in no acute distress.  Neurological - alert and oriented X 3  Psychiatric - normal mood and affect    Pelvic-  Cervix: acetowhite area(s) circumferentially around the transformation zone.  No atypical vessels, leukoplakia or mosaicism.       Pap repeated?:  No  SCJ seen?:  yes    ECC done?:  No  Lugol's solution used?:  Yes   Satisfactory examination?:  yes      ASSESSMENT: HPV/cervicitis/low grade changes.  No evidence HGSIL or invasive cancer.     PLAN: Repeat Pap smear " pp    Discussed cervical dysplasia/HPV, importance of follow up.  yesenia Johnson MD

## 2022-05-04 NOTE — PROGRESS NOTES
Doing well.  No concerns today.  Denies PTL sx, vb, lof  + whitish discharge without vaginitis sx.  Wet prep neg.  Colposcopy done  Reminded of upcoming labs including 1 hour GTT  Reviewed recent US-no concerns with anatomical survey.  Return to clinic in 4 weeks    Abiodun Johnson MD  5/4/2022

## 2022-05-14 ENCOUNTER — HEALTH MAINTENANCE LETTER (OUTPATIENT)
Age: 32
End: 2022-05-14

## 2022-05-31 ENCOUNTER — PRENATAL OFFICE VISIT (OUTPATIENT)
Dept: OBGYN | Facility: OTHER | Age: 32
End: 2022-05-31
Attending: OBSTETRICS & GYNECOLOGY
Payer: MEDICAID

## 2022-05-31 VITALS
DIASTOLIC BLOOD PRESSURE: 68 MMHG | HEIGHT: 65 IN | SYSTOLIC BLOOD PRESSURE: 106 MMHG | BODY MASS INDEX: 28.32 KG/M2 | WEIGHT: 170 LBS

## 2022-05-31 DIAGNOSIS — Z34.92 ENCOUNTER FOR SUPERVISION OF LOW-RISK PREGNANCY IN SECOND TRIMESTER: Primary | ICD-10-CM

## 2022-05-31 PROCEDURE — G0463 HOSPITAL OUTPT CLINIC VISIT: HCPCS

## 2022-05-31 PROCEDURE — 99207 PR PRENATAL VISIT: CPT | Performed by: OBSTETRICS & GYNECOLOGY

## 2022-05-31 ASSESSMENT — PAIN SCALES - GENERAL: PAINLEVEL: NO PAIN (0)

## 2022-05-31 NOTE — PROGRESS NOTES
Had Covid 5/22, recovered/mild case  Doing well.  No concerns today.  Denies PTL sx, vb, lof  Scheduled pcoming labs including 1 hour GTT  Return to clinic in 2 weeks  US growth 32 wks.     Abiodun Johnson MD  5/31/2022

## 2022-05-31 NOTE — NURSING NOTE
"Chief Complaint   Patient presents with     Prenatal Care       Initial /68   Ht 1.651 m (5' 5\")   Wt 77.1 kg (170 lb)   LMP 11/19/2021   BMI 28.29 kg/m   Estimated body mass index is 28.29 kg/m  as calculated from the following:    Height as of this encounter: 1.651 m (5' 5\").    Weight as of this encounter: 77.1 kg (170 lb).  Medication Reconciliation: complete  Negar Sandhu LPN    "

## 2022-06-03 ENCOUNTER — LAB (OUTPATIENT)
Dept: LAB | Facility: OTHER | Age: 32
End: 2022-06-03
Payer: MEDICAID

## 2022-06-03 DIAGNOSIS — Z34.92 ENCOUNTER FOR SUPERVISION OF LOW-RISK PREGNANCY IN SECOND TRIMESTER: ICD-10-CM

## 2022-06-03 LAB
ANTIBODY SCREEN: NEGATIVE
ERYTHROCYTE [DISTWIDTH] IN BLOOD BY AUTOMATED COUNT: 12.8 % (ref 10–15)
GLUCOSE 1H P 50 G GLC PO SERPL-MCNC: 151 MG/DL (ref 70–129)
HCT VFR BLD AUTO: 34 % (ref 35–47)
HGB BLD-MCNC: 11.6 G/DL (ref 11.7–15.7)
MCH RBC QN AUTO: 32 PG (ref 26.5–33)
MCHC RBC AUTO-ENTMCNC: 34.1 G/DL (ref 31.5–36.5)
MCV RBC AUTO: 94 FL (ref 78–100)
PLATELET # BLD AUTO: 215 10E3/UL (ref 150–450)
RBC # BLD AUTO: 3.63 10E6/UL (ref 3.8–5.2)
SPECIMEN EXPIRATION DATE: NORMAL
WBC # BLD AUTO: 12 10E3/UL (ref 4–11)

## 2022-06-03 PROCEDURE — 36415 COLL VENOUS BLD VENIPUNCTURE: CPT | Mod: ZL

## 2022-06-03 PROCEDURE — 86850 RBC ANTIBODY SCREEN: CPT

## 2022-06-03 PROCEDURE — 85014 HEMATOCRIT: CPT | Mod: ZL

## 2022-06-03 PROCEDURE — 82950 GLUCOSE TEST: CPT | Mod: ZL

## 2022-06-09 DIAGNOSIS — R73.09 ELEVATED GLUCOSE TOLERANCE TEST: Primary | ICD-10-CM

## 2022-06-14 ENCOUNTER — PRENATAL OFFICE VISIT (OUTPATIENT)
Dept: OBGYN | Facility: OTHER | Age: 32
End: 2022-06-14
Attending: OBSTETRICS & GYNECOLOGY
Payer: MEDICAID

## 2022-06-14 ENCOUNTER — LAB (OUTPATIENT)
Dept: LAB | Facility: OTHER | Age: 32
End: 2022-06-14
Attending: OBSTETRICS & GYNECOLOGY
Payer: MEDICAID

## 2022-06-14 VITALS
DIASTOLIC BLOOD PRESSURE: 62 MMHG | HEART RATE: 74 BPM | OXYGEN SATURATION: 98 % | BODY MASS INDEX: 28.37 KG/M2 | SYSTOLIC BLOOD PRESSURE: 104 MMHG | HEIGHT: 65 IN | WEIGHT: 170.3 LBS

## 2022-06-14 DIAGNOSIS — Z34.93 ENCOUNTER FOR SUPERVISION OF LOW-RISK PREGNANCY IN THIRD TRIMESTER: ICD-10-CM

## 2022-06-14 DIAGNOSIS — R73.09 ELEVATED GLUCOSE TOLERANCE TEST: Primary | ICD-10-CM

## 2022-06-14 DIAGNOSIS — R73.09 ELEVATED GLUCOSE TOLERANCE TEST: ICD-10-CM

## 2022-06-14 LAB
GESTATIONAL GTT 1 HR POST DOSE: 140 MG/DL (ref 60–179)
GESTATIONAL GTT 2 HR POST DOSE: 126 MG/DL (ref 60–154)
GESTATIONAL GTT 3 HR POST DOSE: 97 MG/DL (ref 60–139)
GLUCOSE P FAST SERPL-MCNC: 93 MG/DL (ref 60–94)

## 2022-06-14 PROCEDURE — 82951 GLUCOSE TOLERANCE TEST (GTT): CPT | Mod: ZL

## 2022-06-14 PROCEDURE — 82947 ASSAY GLUCOSE BLOOD QUANT: CPT | Mod: ZL

## 2022-06-14 PROCEDURE — G0463 HOSPITAL OUTPT CLINIC VISIT: HCPCS

## 2022-06-14 PROCEDURE — 82950 GLUCOSE TEST: CPT | Mod: ZL

## 2022-06-14 PROCEDURE — 36415 COLL VENOUS BLD VENIPUNCTURE: CPT | Mod: ZL

## 2022-06-14 PROCEDURE — 99207 PR PRENATAL VISIT: CPT | Performed by: OBSTETRICS & GYNECOLOGY

## 2022-06-14 ASSESSMENT — PAIN SCALES - GENERAL: PAINLEVEL: NO PAIN (0)

## 2022-06-14 NOTE — NURSING NOTE
"Chief Complaint   Patient presents with     Prenatal Care     OB 29 w  4 d       Initial /62 (BP Location: Left arm, Patient Position: Sitting, Cuff Size: Adult Regular)   Pulse 74   Ht 1.651 m (5' 5\")   Wt 77.2 kg (170 lb 4.8 oz)   LMP 11/19/2021   SpO2 98%   BMI 28.34 kg/m   Estimated body mass index is 28.34 kg/m  as calculated from the following:    Height as of this encounter: 1.651 m (5' 5\").    Weight as of this encounter: 77.2 kg (170 lb 4.8 oz).  Medication Reconciliation: complete  LUCIO ZUNIGA LPN    "

## 2022-06-15 NOTE — PROGRESS NOTES
Doing well.  No concerns today.  3 hr GTT today  Discussed kick counts and fetal movement.  RTC in 2 weeks  Denies PTL sadrien, denise, goran Johnson MD  6/15/2022

## 2022-06-28 ENCOUNTER — PRENATAL OFFICE VISIT (OUTPATIENT)
Dept: OBGYN | Facility: OTHER | Age: 32
End: 2022-06-28
Attending: OBSTETRICS & GYNECOLOGY
Payer: MEDICAID

## 2022-06-28 VITALS
SYSTOLIC BLOOD PRESSURE: 120 MMHG | WEIGHT: 177 LBS | DIASTOLIC BLOOD PRESSURE: 60 MMHG | BODY MASS INDEX: 29.49 KG/M2 | HEIGHT: 65 IN | HEART RATE: 78 BPM | OXYGEN SATURATION: 98 %

## 2022-06-28 DIAGNOSIS — Z34.80 PREGNANCY IN MULTIGRAVIDA: ICD-10-CM

## 2022-06-28 DIAGNOSIS — N18.1 STAGE 1 CHRONIC KIDNEY DISEASE: Primary | ICD-10-CM

## 2022-06-28 LAB
ALBUMIN UR-MCNC: NEGATIVE MG/DL
ANION GAP SERPL CALCULATED.3IONS-SCNC: 10 MMOL/L (ref 3–14)
APPEARANCE UR: CLEAR
BACTERIA #/AREA URNS HPF: ABNORMAL /HPF
BILIRUB UR QL STRIP: NEGATIVE
BUN SERPL-MCNC: 8 MG/DL (ref 7–30)
CALCIUM SERPL-MCNC: 9 MG/DL (ref 8.5–10.1)
CHLORIDE BLD-SCNC: 106 MMOL/L (ref 94–109)
CO2 SERPL-SCNC: 20 MMOL/L (ref 20–32)
COLOR UR AUTO: ABNORMAL
CREAT SERPL-MCNC: 0.54 MG/DL (ref 0.52–1.04)
CREAT UR-MCNC: 52 MG/DL
GFR SERPL CREATININE-BSD FRML MDRD: >90 ML/MIN/1.73M2
GLUCOSE BLD-MCNC: 96 MG/DL (ref 70–99)
GLUCOSE UR STRIP-MCNC: NEGATIVE MG/DL
HGB UR QL STRIP: NEGATIVE
KETONES UR STRIP-MCNC: NEGATIVE MG/DL
LEUKOCYTE ESTERASE UR QL STRIP: NEGATIVE
MUCOUS THREADS #/AREA URNS LPF: PRESENT /LPF
NITRATE UR QL: NEGATIVE
PH UR STRIP: 7 [PH] (ref 4.7–8)
POTASSIUM BLD-SCNC: 3.4 MMOL/L (ref 3.4–5.3)
PROT UR-MCNC: 0.11 G/L
PROT/CREAT 24H UR: 0.21 G/G CR (ref 0–0.2)
RBC URINE: 1 /HPF
SODIUM SERPL-SCNC: 136 MMOL/L (ref 133–144)
SP GR UR STRIP: 1.01 (ref 1–1.03)
SQUAMOUS EPITHELIAL: 0 /HPF
UROBILINOGEN UR STRIP-MCNC: NORMAL MG/DL
WBC URINE: 1 /HPF

## 2022-06-28 PROCEDURE — 82310 ASSAY OF CALCIUM: CPT | Mod: ZL | Performed by: OBSTETRICS & GYNECOLOGY

## 2022-06-28 PROCEDURE — 84156 ASSAY OF PROTEIN URINE: CPT | Mod: ZL | Performed by: OBSTETRICS & GYNECOLOGY

## 2022-06-28 PROCEDURE — 99207 PR PRENATAL VISIT: CPT | Performed by: OBSTETRICS & GYNECOLOGY

## 2022-06-28 PROCEDURE — G0463 HOSPITAL OUTPT CLINIC VISIT: HCPCS

## 2022-06-28 PROCEDURE — 36415 COLL VENOUS BLD VENIPUNCTURE: CPT | Mod: ZL | Performed by: OBSTETRICS & GYNECOLOGY

## 2022-06-28 PROCEDURE — 81001 URINALYSIS AUTO W/SCOPE: CPT | Mod: ZL | Performed by: OBSTETRICS & GYNECOLOGY

## 2022-06-28 ASSESSMENT — PAIN SCALES - GENERAL: PAINLEVEL: NO PAIN (0)

## 2022-06-28 NOTE — NURSING NOTE
"Chief Complaint   Patient presents with     Prenatal Care     31 weeks 4 days       Initial /60 (BP Location: Left arm, Patient Position: Chair, Cuff Size: Adult Regular)   Pulse 78   Ht 1.651 m (5' 5\")   Wt 80.3 kg (177 lb)   LMP 11/19/2021   SpO2 98%   BMI 29.45 kg/m   Estimated body mass index is 29.45 kg/m  as calculated from the following:    Height as of this encounter: 1.651 m (5' 5\").    Weight as of this encounter: 80.3 kg (177 lb).  Medication Reconciliation: complete  BRII GIORDANO LPN    " [facility],   Phone: (   )    -  Fax: (   )    -  Follow Up Time:

## 2022-06-28 NOTE — PROGRESS NOTES
Doing well.  No concerns today.  Some LBP.  Discussed stretching, support belt, chiropractor.   Discussed kick counts and fetal movement.  RTC in 2 weeks  BMP, UA, Urine pro/cr baseline labs ordered(h/o PKD)  US growth tomorrow.   Denies PTL sx, vb, lof  Abiodun Johnson MD  6/28/2022

## 2022-06-29 ENCOUNTER — HOSPITAL ENCOUNTER (OUTPATIENT)
Dept: ULTRASOUND IMAGING | Facility: HOSPITAL | Age: 32
Discharge: HOME | End: 2022-06-29
Attending: OBSTETRICS & GYNECOLOGY | Admitting: OBSTETRICS & GYNECOLOGY
Payer: MEDICAID

## 2022-06-29 DIAGNOSIS — Z34.92 ENCOUNTER FOR SUPERVISION OF LOW-RISK PREGNANCY IN SECOND TRIMESTER: ICD-10-CM

## 2022-06-29 PROCEDURE — 76816 OB US FOLLOW-UP PER FETUS: CPT

## 2022-07-15 ENCOUNTER — PRENATAL OFFICE VISIT (OUTPATIENT)
Dept: OBGYN | Facility: OTHER | Age: 32
End: 2022-07-15
Attending: NURSE PRACTITIONER
Payer: MEDICAID

## 2022-07-15 VITALS
HEIGHT: 65 IN | HEART RATE: 79 BPM | DIASTOLIC BLOOD PRESSURE: 61 MMHG | OXYGEN SATURATION: 98 % | WEIGHT: 182.5 LBS | SYSTOLIC BLOOD PRESSURE: 124 MMHG | BODY MASS INDEX: 30.41 KG/M2

## 2022-07-15 DIAGNOSIS — Z34.93 ENCOUNTER FOR SUPERVISION OF LOW-RISK PREGNANCY IN THIRD TRIMESTER: Primary | ICD-10-CM

## 2022-07-15 PROCEDURE — 99207 PR PRENATAL VISIT: CPT | Performed by: NURSE PRACTITIONER

## 2022-07-15 PROCEDURE — G0463 HOSPITAL OUTPT CLINIC VISIT: HCPCS | Performed by: COUNSELOR

## 2022-07-15 ASSESSMENT — PAIN SCALES - GENERAL: PAINLEVEL: NO PAIN (0)

## 2022-07-15 NOTE — PROGRESS NOTES
Doing well.  Denies concerns.  US for growth reviewed.  Baby active.  Denies cramping or karolyn.  NoVB.  Return in 2 weeks.

## 2022-07-15 NOTE — NURSING NOTE
"Chief Complaint   Patient presents with     Prenatal Care     34 weeks 0 days       Initial /61 (BP Location: Right arm, Patient Position: Sitting, Cuff Size: Adult Regular)   Pulse 79   Ht 1.651 m (5' 5\")   Wt 82.8 kg (182 lb 8 oz)   LMP 11/19/2021   SpO2 98%   BMI 30.37 kg/m   Estimated body mass index is 30.37 kg/m  as calculated from the following:    Height as of this encounter: 1.651 m (5' 5\").    Weight as of this encounter: 82.8 kg (182 lb 8 oz).  Medication Reconciliation: complete     Amparo Espitia, VICTOR M    "

## 2022-07-29 ENCOUNTER — PRENATAL OFFICE VISIT (OUTPATIENT)
Dept: OBGYN | Facility: OTHER | Age: 32
End: 2022-07-29
Attending: OBSTETRICS & GYNECOLOGY
Payer: MEDICAID

## 2022-07-29 VITALS
BODY MASS INDEX: 30.47 KG/M2 | HEIGHT: 65 IN | SYSTOLIC BLOOD PRESSURE: 120 MMHG | WEIGHT: 182.9 LBS | DIASTOLIC BLOOD PRESSURE: 68 MMHG

## 2022-07-29 DIAGNOSIS — O99.343 DEPRESSION DURING PREGNANCY IN THIRD TRIMESTER: ICD-10-CM

## 2022-07-29 DIAGNOSIS — F32.A DEPRESSION DURING PREGNANCY IN THIRD TRIMESTER: ICD-10-CM

## 2022-07-29 DIAGNOSIS — Z34.93 ENCOUNTER FOR SUPERVISION OF LOW-RISK PREGNANCY IN THIRD TRIMESTER: Primary | ICD-10-CM

## 2022-07-29 PROCEDURE — 87653 STREP B DNA AMP PROBE: CPT | Mod: ZL | Performed by: OBSTETRICS & GYNECOLOGY

## 2022-07-29 PROCEDURE — G0463 HOSPITAL OUTPT CLINIC VISIT: HCPCS | Mod: 25

## 2022-07-29 PROCEDURE — G0463 HOSPITAL OUTPT CLINIC VISIT: HCPCS

## 2022-07-29 PROCEDURE — 99207 PR COMPLICATED OB VISIT: CPT | Performed by: OBSTETRICS & GYNECOLOGY

## 2022-07-29 ASSESSMENT — PAIN SCALES - GENERAL: PAINLEVEL: NO PAIN (0)

## 2022-07-29 NOTE — NURSING NOTE
"Chief Complaint   Patient presents with     Prenatal Care     36       Initial /68 (BP Location: Left arm, Patient Position: Sitting, Cuff Size: Adult Large)   Ht 1.651 m (5' 5\")   Wt 83 kg (182 lb 14.4 oz)   LMP 11/19/2021   BMI 30.44 kg/m   Estimated body mass index is 30.44 kg/m  as calculated from the following:    Height as of this encounter: 1.651 m (5' 5\").    Weight as of this encounter: 83 kg (182 lb 14.4 oz).  Medication Reconciliation: complete  KRISTIAN COLON LPN  "

## 2022-07-29 NOTE — PROGRESS NOTES
Doing well.  No concerns today except increasing depression modd sx.  PHQ 9 =11.  No thoughts of harm.  Medication R/B/SE's discussed.  Zoloft rx 50 mg daily  GBS done  Discussed signs of labor and when to call or come in.  Discussed kick counts and fetal movement.  RTC in 1 week  Denies regular contractions, vaginal bleeding, CARMELO Johnson MD  7/29/2022

## 2022-07-30 LAB — GP B STREP DNA SPEC QL NAA+PROBE: NEGATIVE

## 2022-08-01 ASSESSMENT — PATIENT HEALTH QUESTIONNAIRE - PHQ9: SUM OF ALL RESPONSES TO PHQ QUESTIONS 1-9: 13

## 2022-08-04 ENCOUNTER — PRENATAL OFFICE VISIT (OUTPATIENT)
Dept: OBGYN | Facility: OTHER | Age: 32
End: 2022-08-04
Attending: OBSTETRICS & GYNECOLOGY
Payer: MEDICAID

## 2022-08-04 VITALS
BODY MASS INDEX: 30.66 KG/M2 | HEIGHT: 65 IN | OXYGEN SATURATION: 98 % | HEART RATE: 88 BPM | DIASTOLIC BLOOD PRESSURE: 70 MMHG | SYSTOLIC BLOOD PRESSURE: 110 MMHG | WEIGHT: 184 LBS

## 2022-08-04 DIAGNOSIS — Z34.93 ENCOUNTER FOR SUPERVISION OF LOW-RISK PREGNANCY IN THIRD TRIMESTER: Primary | ICD-10-CM

## 2022-08-04 PROCEDURE — 99207 PR PRENATAL VISIT: CPT | Performed by: OBSTETRICS & GYNECOLOGY

## 2022-08-04 PROCEDURE — G0463 HOSPITAL OUTPT CLINIC VISIT: HCPCS

## 2022-08-04 ASSESSMENT — PAIN SCALES - GENERAL: PAINLEVEL: NO PAIN (0)

## 2022-08-04 NOTE — PROGRESS NOTES
Doing well.  Some increased BH ctx.    Tried Zoloft, made her a little jittery so advised cutting in half and starting at lower dose and increase in 1 week if tolerated.   Discussed kick counts and fetal movement.  She will report to OB if contractions every 5-10 minutes or if rupture of membranes.  RTC in 1 week.  Check cervix next visit.   Denies regular contractions, vaginal bleeding, CARMELO Johnson MD  8/4/2022

## 2022-08-04 NOTE — NURSING NOTE
"Chief Complaint   Patient presents with     Prenatal Care     36 weeks 6 days       Initial /70 (BP Location: Left arm, Patient Position: Chair, Cuff Size: Adult Regular)   Pulse 88   Ht 1.651 m (5' 5\")   Wt 83.5 kg (184 lb)   LMP 11/19/2021   SpO2 98%   BMI 30.62 kg/m   Estimated body mass index is 30.62 kg/m  as calculated from the following:    Height as of this encounter: 1.651 m (5' 5\").    Weight as of this encounter: 83.5 kg (184 lb).  Medication Reconciliation: complete  BRII GIORDANO LPN    "

## 2022-08-11 ENCOUNTER — PRENATAL OFFICE VISIT (OUTPATIENT)
Dept: OBGYN | Facility: OTHER | Age: 32
End: 2022-08-11
Attending: FAMILY MEDICINE
Payer: MEDICAID

## 2022-08-11 VITALS — BODY MASS INDEX: 30.79 KG/M2 | DIASTOLIC BLOOD PRESSURE: 62 MMHG | WEIGHT: 185 LBS | SYSTOLIC BLOOD PRESSURE: 122 MMHG

## 2022-08-11 DIAGNOSIS — Z34.93 ENCOUNTER FOR SUPERVISION OF LOW-RISK PREGNANCY IN THIRD TRIMESTER: Primary | ICD-10-CM

## 2022-08-11 PROCEDURE — 99207 PR PRENATAL VISIT: CPT | Performed by: OBSTETRICS & GYNECOLOGY

## 2022-08-11 PROCEDURE — G0463 HOSPITAL OUTPT CLINIC VISIT: HCPCS

## 2022-08-11 ASSESSMENT — PAIN SCALES - GENERAL: PAINLEVEL: NO PAIN (0)

## 2022-08-11 NOTE — PROGRESS NOTES
Doing well.  No concerns today.  Prenatal flowsheet information is reviewed.  Discussed kick counts and fetal movement and labor precautions.  RTC in 1weeks  Denies PTL denise hutchinson, goran Johnson MD  8/11/2022

## 2022-08-19 ENCOUNTER — PRENATAL OFFICE VISIT (OUTPATIENT)
Dept: OBGYN | Facility: OTHER | Age: 32
End: 2022-08-19
Attending: NURSE PRACTITIONER
Payer: MEDICAID

## 2022-08-19 VITALS
BODY MASS INDEX: 31 KG/M2 | DIASTOLIC BLOOD PRESSURE: 76 MMHG | HEIGHT: 65 IN | OXYGEN SATURATION: 96 % | HEART RATE: 93 BPM | WEIGHT: 186.1 LBS | SYSTOLIC BLOOD PRESSURE: 119 MMHG

## 2022-08-19 DIAGNOSIS — Z34.93 ENCOUNTER FOR SUPERVISION OF LOW-RISK PREGNANCY IN THIRD TRIMESTER: Primary | ICD-10-CM

## 2022-08-19 PROCEDURE — 99207 PR PRENATAL VISIT: CPT | Performed by: NURSE PRACTITIONER

## 2022-08-19 PROCEDURE — G0463 HOSPITAL OUTPT CLINIC VISIT: HCPCS | Mod: 25 | Performed by: COUNSELOR

## 2022-08-19 PROCEDURE — G0463 HOSPITAL OUTPT CLINIC VISIT: HCPCS | Performed by: COUNSELOR

## 2022-08-19 ASSESSMENT — PAIN SCALES - GENERAL: PAINLEVEL: NO PAIN (0)

## 2022-08-19 NOTE — NURSING NOTE
"Chief Complaint   Patient presents with     Prenatal Care     39 weeks 0 days       Initial /76 (BP Location: Right arm, Patient Position: Sitting, Cuff Size: Adult Regular)   Pulse 93   Ht 1.651 m (5' 5\")   Wt 84.4 kg (186 lb 1.6 oz)   LMP 11/19/2021   SpO2 96%   BMI 30.97 kg/m   Estimated body mass index is 30.97 kg/m  as calculated from the following:    Height as of this encounter: 1.651 m (5' 5\").    Weight as of this encounter: 84.4 kg (186 lb 1.6 oz).  Medication Reconciliation: complete     Amparo Espitia LPN    "

## 2022-08-19 NOTE — PATIENT INSTRUCTIONS
"BREASTFEEDING TIPS  1. Breastfeed every 2-4 hours. If your baby is sleepy - use breast compression, push on chin to \"start up\" baby, switch breasts, undress to diaper and wake before relatching.   Some babies \"cluster\" feed every 1 hour for a while- this is normal. Feed your baby whenever he/she is awake-  even if every hour for a while. This frequent feeding will help you make more milk and encourage your baby to sleep for longer stretches later in the evening or night.    - Position your baby close to you with pillows so he/she is facing you -belly to belly laying horizontally across your lap at the level of your breast and looking a bit \"upwards\" to your breast   -One hand holds the baby's neck behind the ears and the other hand holds your breast  -Baby's nose should start out pointing to your nipple before latching  - Hold your breast in a \"sandwich\" position by gently squeezing your breast in an oval shape and make sure your hands are not covering the areola  This \"nipple sandwich\" will make it easier for your breast to fit inside the baby's mouth-making latching more comfortable for you and baby and preventing sore nipples. Your baby should take a \"mouthful\" of breast!  - You may want to use hand expression to \"prime the pump\" and get a drip of milk out on your nipple to wake baby   (see website: newborns.[employer].edu/Breastfeeding/HandExpression.html)  - Swipe your nipple on baby's upper lip and wait for a BIG open mouth  - YOU bring baby to the breast (hold baby's neck with your fingers just below the ears) and bring baby's head to the breast--leading with the chin.  Try to avoid pushing your breast into baby's mouth- bring baby to you instead!  - Aim to get your baby's bottom lip LOW DOWN ON AREOLA (baby's upper lip just needs to \"clear\" the nipple) .   Your baby should latch onto the areola and NOT just the nipple. That way your baby gets more milk and you don't get sore nipples!      Useful web " sites:  Www.infantrisk.com  Www.aap.org  Www.ibreastfeeding.com  Www.health.[facility].mn.us

## 2022-08-19 NOTE — PROGRESS NOTES
Denies cramping, karolyn, VB, or leaking.  Baby active.  Reviewed signs of labor and pain management options for labor. Return in 1 week.

## 2022-08-23 ENCOUNTER — HOSPITAL ENCOUNTER (INPATIENT)
Facility: HOSPITAL | Age: 32
LOS: 1 days | Discharge: HOME | End: 2022-08-24
Attending: OBSTETRICS & GYNECOLOGY | Admitting: OBSTETRICS & GYNECOLOGY
Payer: MEDICAID

## 2022-08-23 ENCOUNTER — ANESTHESIA EVENT (OUTPATIENT)
Dept: OBGYN | Facility: HOSPITAL | Age: 32
End: 2022-08-23
Payer: MEDICAID

## 2022-08-23 ENCOUNTER — ANESTHESIA (OUTPATIENT)
Dept: OBGYN | Facility: HOSPITAL | Age: 32
End: 2022-08-23
Payer: MEDICAID

## 2022-08-23 DIAGNOSIS — Z37.9 NORMAL LABOR: ICD-10-CM

## 2022-08-23 DIAGNOSIS — Z34.91 ENCOUNTER FOR SUPERVISION OF LOW-RISK PREGNANCY IN FIRST TRIMESTER: ICD-10-CM

## 2022-08-23 DIAGNOSIS — F33.1 MODERATE EPISODE OF RECURRENT MAJOR DEPRESSIVE DISORDER (H): ICD-10-CM

## 2022-08-23 DIAGNOSIS — G89.18 ACUTE POST-OPERATIVE PAIN: Primary | ICD-10-CM

## 2022-08-23 PROBLEM — U07.1 INFECTION DUE TO 2019 NOVEL CORONAVIRUS: Status: ACTIVE | Noted: 2022-05-10

## 2022-08-23 PROBLEM — Z36.89 ENCOUNTER FOR TRIAGE IN PREGNANT PATIENT: Status: ACTIVE | Noted: 2022-08-23

## 2022-08-23 LAB
ABO/RH(D): NORMAL
ANTIBODY SCREEN: NEGATIVE
ERYTHROCYTE [DISTWIDTH] IN BLOOD BY AUTOMATED COUNT: 13 % (ref 10–15)
HCT VFR BLD AUTO: 37.3 % (ref 35–47)
HGB BLD-MCNC: 12.5 G/DL (ref 11.7–15.7)
MCH RBC QN AUTO: 30.6 PG (ref 26.5–33)
MCHC RBC AUTO-ENTMCNC: 33.5 G/DL (ref 31.5–36.5)
MCV RBC AUTO: 91 FL (ref 78–100)
PLATELET # BLD AUTO: 292 10E3/UL (ref 150–450)
RBC # BLD AUTO: 4.08 10E6/UL (ref 3.8–5.2)
SARS-COV-2 RNA RESP QL NAA+PROBE: NEGATIVE
SPECIMEN EXPIRATION DATE: NORMAL
WBC # BLD AUTO: 16.3 10E3/UL (ref 4–11)

## 2022-08-23 PROCEDURE — 86780 TREPONEMA PALLIDUM: CPT | Performed by: OBSTETRICS & GYNECOLOGY

## 2022-08-23 PROCEDURE — 85014 HEMATOCRIT: CPT | Performed by: OBSTETRICS & GYNECOLOGY

## 2022-08-23 PROCEDURE — 258N000003 HC RX IP 258 OP 636: Performed by: OBSTETRICS & GYNECOLOGY

## 2022-08-23 PROCEDURE — 250N000011 HC RX IP 250 OP 636: Performed by: NURSE ANESTHETIST, CERTIFIED REGISTERED

## 2022-08-23 PROCEDURE — 86850 RBC ANTIBODY SCREEN: CPT | Performed by: OBSTETRICS & GYNECOLOGY

## 2022-08-23 PROCEDURE — 120N000001 HC R&B MED SURG/OB

## 2022-08-23 PROCEDURE — 250N000013 HC RX MED GY IP 250 OP 250 PS 637: Performed by: OBSTETRICS & GYNECOLOGY

## 2022-08-23 PROCEDURE — G0463 HOSPITAL OUTPT CLINIC VISIT: HCPCS | Mod: 25

## 2022-08-23 PROCEDURE — U0005 INFEC AGEN DETEC AMPLI PROBE: HCPCS | Performed by: OBSTETRICS & GYNECOLOGY

## 2022-08-23 PROCEDURE — 36415 COLL VENOUS BLD VENIPUNCTURE: CPT | Performed by: OBSTETRICS & GYNECOLOGY

## 2022-08-23 PROCEDURE — 59400 OBSTETRICAL CARE: CPT | Performed by: OBSTETRICS & GYNECOLOGY

## 2022-08-23 PROCEDURE — 250N000009 HC RX 250: Performed by: OBSTETRICS & GYNECOLOGY

## 2022-08-23 PROCEDURE — 250N000009 HC RX 250: Performed by: NURSE ANESTHETIST, CERTIFIED REGISTERED

## 2022-08-23 PROCEDURE — 59025 FETAL NON-STRESS TEST: CPT

## 2022-08-23 PROCEDURE — 10907ZC DRAINAGE OF AMNIOTIC FLUID, THERAPEUTIC FROM PRODUCTS OF CONCEPTION, VIA NATURAL OR ARTIFICIAL OPENING: ICD-10-PCS | Performed by: OBSTETRICS & GYNECOLOGY

## 2022-08-23 PROCEDURE — 59400 OBSTETRICAL CARE: CPT | Performed by: NURSE ANESTHETIST, CERTIFIED REGISTERED

## 2022-08-23 RX ORDER — TRANEXAMIC ACID 10 MG/ML
1 INJECTION, SOLUTION INTRAVENOUS EVERY 30 MIN PRN
Status: DISCONTINUED | OUTPATIENT
Start: 2022-08-23 | End: 2022-08-24 | Stop reason: HOSPADM

## 2022-08-23 RX ORDER — CARBOPROST TROMETHAMINE 250 UG/ML
250 INJECTION, SOLUTION INTRAMUSCULAR
Status: DISCONTINUED | OUTPATIENT
Start: 2022-08-23 | End: 2022-08-23 | Stop reason: HOSPADM

## 2022-08-23 RX ORDER — METOCLOPRAMIDE HYDROCHLORIDE 5 MG/ML
10 INJECTION INTRAMUSCULAR; INTRAVENOUS EVERY 6 HOURS PRN
Status: DISCONTINUED | OUTPATIENT
Start: 2022-08-23 | End: 2022-08-23 | Stop reason: HOSPADM

## 2022-08-23 RX ORDER — OXYTOCIN/0.9 % SODIUM CHLORIDE 30/500 ML
340 PLASTIC BAG, INJECTION (ML) INTRAVENOUS CONTINUOUS PRN
Status: DISCONTINUED | OUTPATIENT
Start: 2022-08-23 | End: 2022-08-24 | Stop reason: HOSPADM

## 2022-08-23 RX ORDER — FENTANYL CITRATE 50 UG/ML
50 INJECTION, SOLUTION INTRAMUSCULAR; INTRAVENOUS EVERY 30 MIN PRN
Status: DISCONTINUED | OUTPATIENT
Start: 2022-08-23 | End: 2022-08-23 | Stop reason: HOSPADM

## 2022-08-23 RX ORDER — NALOXONE HYDROCHLORIDE 0.4 MG/ML
0.2 INJECTION, SOLUTION INTRAMUSCULAR; INTRAVENOUS; SUBCUTANEOUS
Status: DISCONTINUED | OUTPATIENT
Start: 2022-08-23 | End: 2022-08-23 | Stop reason: HOSPADM

## 2022-08-23 RX ORDER — OXYTOCIN 10 [USP'U]/ML
10 INJECTION, SOLUTION INTRAMUSCULAR; INTRAVENOUS
Status: DISCONTINUED | OUTPATIENT
Start: 2022-08-23 | End: 2022-08-24 | Stop reason: HOSPADM

## 2022-08-23 RX ORDER — HYDROCORTISONE 25 MG/G
CREAM TOPICAL 3 TIMES DAILY PRN
Status: DISCONTINUED | OUTPATIENT
Start: 2022-08-23 | End: 2022-08-24 | Stop reason: HOSPADM

## 2022-08-23 RX ORDER — METOCLOPRAMIDE 10 MG/1
10 TABLET ORAL EVERY 6 HOURS PRN
Status: DISCONTINUED | OUTPATIENT
Start: 2022-08-23 | End: 2022-08-23 | Stop reason: HOSPADM

## 2022-08-23 RX ORDER — SODIUM CHLORIDE, SODIUM LACTATE, POTASSIUM CHLORIDE, CALCIUM CHLORIDE 600; 310; 30; 20 MG/100ML; MG/100ML; MG/100ML; MG/100ML
INJECTION, SOLUTION INTRAVENOUS CONTINUOUS
Status: DISCONTINUED | OUTPATIENT
Start: 2022-08-23 | End: 2022-08-23 | Stop reason: HOSPADM

## 2022-08-23 RX ORDER — KETOROLAC TROMETHAMINE 30 MG/ML
30 INJECTION, SOLUTION INTRAMUSCULAR; INTRAVENOUS
Status: DISCONTINUED | OUTPATIENT
Start: 2022-08-23 | End: 2022-08-23

## 2022-08-23 RX ORDER — IBUPROFEN 800 MG/1
800 TABLET, FILM COATED ORAL
Status: DISCONTINUED | OUTPATIENT
Start: 2022-08-23 | End: 2022-08-23

## 2022-08-23 RX ORDER — ONDANSETRON 4 MG/1
4 TABLET, ORALLY DISINTEGRATING ORAL EVERY 6 HOURS PRN
Status: DISCONTINUED | OUTPATIENT
Start: 2022-08-23 | End: 2022-08-23

## 2022-08-23 RX ORDER — CARBOPROST TROMETHAMINE 250 UG/ML
250 INJECTION, SOLUTION INTRAMUSCULAR
Status: DISCONTINUED | OUTPATIENT
Start: 2022-08-23 | End: 2022-08-24 | Stop reason: HOSPADM

## 2022-08-23 RX ORDER — MISOPROSTOL 200 UG/1
400 TABLET ORAL
Status: DISCONTINUED | OUTPATIENT
Start: 2022-08-23 | End: 2022-08-23 | Stop reason: HOSPADM

## 2022-08-23 RX ORDER — OXYTOCIN 10 [USP'U]/ML
10 INJECTION, SOLUTION INTRAMUSCULAR; INTRAVENOUS
Status: DISCONTINUED | OUTPATIENT
Start: 2022-08-23 | End: 2022-08-23 | Stop reason: HOSPADM

## 2022-08-23 RX ORDER — NALOXONE HYDROCHLORIDE 0.4 MG/ML
0.4 INJECTION, SOLUTION INTRAMUSCULAR; INTRAVENOUS; SUBCUTANEOUS
Status: DISCONTINUED | OUTPATIENT
Start: 2022-08-23 | End: 2022-08-23 | Stop reason: HOSPADM

## 2022-08-23 RX ORDER — ACETAMINOPHEN 325 MG/1
650 TABLET ORAL EVERY 4 HOURS PRN
Status: DISCONTINUED | OUTPATIENT
Start: 2022-08-23 | End: 2022-08-24 | Stop reason: HOSPADM

## 2022-08-23 RX ORDER — SERTRALINE HYDROCHLORIDE 25 MG/1
25 TABLET, FILM COATED ORAL DAILY
Status: DISCONTINUED | OUTPATIENT
Start: 2022-08-23 | End: 2022-08-24 | Stop reason: HOSPADM

## 2022-08-23 RX ORDER — MODIFIED LANOLIN
OINTMENT (GRAM) TOPICAL
Status: DISCONTINUED | OUTPATIENT
Start: 2022-08-23 | End: 2022-08-24 | Stop reason: HOSPADM

## 2022-08-23 RX ORDER — ONDANSETRON 4 MG/1
4 TABLET, ORALLY DISINTEGRATING ORAL EVERY 6 HOURS PRN
Status: DISCONTINUED | OUTPATIENT
Start: 2022-08-23 | End: 2022-08-23 | Stop reason: HOSPADM

## 2022-08-23 RX ORDER — OXYTOCIN/0.9 % SODIUM CHLORIDE 30/500 ML
340 PLASTIC BAG, INJECTION (ML) INTRAVENOUS CONTINUOUS PRN
Status: DISCONTINUED | OUTPATIENT
Start: 2022-08-23 | End: 2022-08-23 | Stop reason: HOSPADM

## 2022-08-23 RX ORDER — EPHEDRINE SULFATE 50 MG/ML
5 INJECTION, SOLUTION INTRAMUSCULAR; INTRAVENOUS; SUBCUTANEOUS
Status: DISCONTINUED | OUTPATIENT
Start: 2022-08-23 | End: 2022-08-23 | Stop reason: HOSPADM

## 2022-08-23 RX ORDER — EPHEDRINE SULFATE 50 MG/ML
INJECTION, SOLUTION INTRAMUSCULAR; INTRAVENOUS; SUBCUTANEOUS
Status: DISCONTINUED
Start: 2022-08-23 | End: 2022-08-23 | Stop reason: WASHOUT

## 2022-08-23 RX ORDER — ONDANSETRON 2 MG/ML
4 INJECTION INTRAMUSCULAR; INTRAVENOUS EVERY 6 HOURS PRN
Status: DISCONTINUED | OUTPATIENT
Start: 2022-08-23 | End: 2022-08-23 | Stop reason: HOSPADM

## 2022-08-23 RX ORDER — METHYLERGONOVINE MALEATE 0.2 MG/ML
200 INJECTION INTRAVENOUS
Status: DISCONTINUED | OUTPATIENT
Start: 2022-08-23 | End: 2022-08-24 | Stop reason: HOSPADM

## 2022-08-23 RX ORDER — MISOPROSTOL 200 UG/1
400 TABLET ORAL
Status: DISCONTINUED | OUTPATIENT
Start: 2022-08-23 | End: 2022-08-24 | Stop reason: HOSPADM

## 2022-08-23 RX ORDER — DOCUSATE SODIUM 100 MG/1
100 CAPSULE, LIQUID FILLED ORAL DAILY
Status: DISCONTINUED | OUTPATIENT
Start: 2022-08-23 | End: 2022-08-24 | Stop reason: HOSPADM

## 2022-08-23 RX ORDER — CITRIC ACID/SODIUM CITRATE 334-500MG
30 SOLUTION, ORAL ORAL
Status: DISCONTINUED | OUTPATIENT
Start: 2022-08-23 | End: 2022-08-23 | Stop reason: HOSPADM

## 2022-08-23 RX ORDER — LIDOCAINE HYDROCHLORIDE AND EPINEPHRINE 15; 5 MG/ML; UG/ML
INJECTION, SOLUTION EPIDURAL PRN
Status: DISCONTINUED | OUTPATIENT
Start: 2022-08-23 | End: 2022-08-23

## 2022-08-23 RX ORDER — LIDOCAINE 40 MG/G
CREAM TOPICAL
Status: DISCONTINUED | OUTPATIENT
Start: 2022-08-23 | End: 2022-08-23 | Stop reason: HOSPADM

## 2022-08-23 RX ORDER — PROCHLORPERAZINE 25 MG
25 SUPPOSITORY, RECTAL RECTAL EVERY 12 HOURS PRN
Status: DISCONTINUED | OUTPATIENT
Start: 2022-08-23 | End: 2022-08-23 | Stop reason: HOSPADM

## 2022-08-23 RX ORDER — METHYLERGONOVINE MALEATE 0.2 MG/ML
200 INJECTION INTRAVENOUS
Status: DISCONTINUED | OUTPATIENT
Start: 2022-08-23 | End: 2022-08-23 | Stop reason: HOSPADM

## 2022-08-23 RX ORDER — NALBUPHINE HYDROCHLORIDE 10 MG/ML
2.5-5 INJECTION, SOLUTION INTRAMUSCULAR; INTRAVENOUS; SUBCUTANEOUS EVERY 6 HOURS PRN
Status: DISCONTINUED | OUTPATIENT
Start: 2022-08-23 | End: 2022-08-24 | Stop reason: HOSPADM

## 2022-08-23 RX ORDER — TRANEXAMIC ACID 10 MG/ML
1 INJECTION, SOLUTION INTRAVENOUS EVERY 30 MIN PRN
Status: DISCONTINUED | OUTPATIENT
Start: 2022-08-23 | End: 2022-08-23 | Stop reason: HOSPADM

## 2022-08-23 RX ORDER — IBUPROFEN 800 MG/1
800 TABLET, FILM COATED ORAL EVERY 6 HOURS PRN
Status: DISCONTINUED | OUTPATIENT
Start: 2022-08-23 | End: 2022-08-24 | Stop reason: HOSPADM

## 2022-08-23 RX ORDER — PROCHLORPERAZINE MALEATE 10 MG
10 TABLET ORAL EVERY 6 HOURS PRN
Status: DISCONTINUED | OUTPATIENT
Start: 2022-08-23 | End: 2022-08-23 | Stop reason: HOSPADM

## 2022-08-23 RX ORDER — ONDANSETRON 2 MG/ML
4 INJECTION INTRAMUSCULAR; INTRAVENOUS EVERY 6 HOURS PRN
Status: DISCONTINUED | OUTPATIENT
Start: 2022-08-23 | End: 2022-08-23

## 2022-08-23 RX ORDER — OXYTOCIN/0.9 % SODIUM CHLORIDE 30/500 ML
100-340 PLASTIC BAG, INJECTION (ML) INTRAVENOUS CONTINUOUS PRN
Status: DISCONTINUED | OUTPATIENT
Start: 2022-08-23 | End: 2022-08-24 | Stop reason: HOSPADM

## 2022-08-23 RX ORDER — BISACODYL 10 MG
10 SUPPOSITORY, RECTAL RECTAL DAILY PRN
Status: DISCONTINUED | OUTPATIENT
Start: 2022-08-23 | End: 2022-08-24 | Stop reason: HOSPADM

## 2022-08-23 RX ADMIN — SODIUM CHLORIDE, POTASSIUM CHLORIDE, SODIUM LACTATE AND CALCIUM CHLORIDE: 600; 310; 30; 20 INJECTION, SOLUTION INTRAVENOUS at 07:17

## 2022-08-23 RX ADMIN — IBUPROFEN 800 MG: 800 TABLET, FILM COATED ORAL at 19:17

## 2022-08-23 RX ADMIN — Medication 8 ML/HR: at 05:33

## 2022-08-23 RX ADMIN — IBUPROFEN 800 MG: 800 TABLET, FILM COATED ORAL at 10:39

## 2022-08-23 RX ADMIN — SODIUM CHLORIDE, POTASSIUM CHLORIDE, SODIUM LACTATE AND CALCIUM CHLORIDE 1000 ML: 600; 310; 30; 20 INJECTION, SOLUTION INTRAVENOUS at 04:44

## 2022-08-23 RX ADMIN — Medication 340 ML/HR: at 10:26

## 2022-08-23 RX ADMIN — SODIUM CHLORIDE, POTASSIUM CHLORIDE, SODIUM LACTATE AND CALCIUM CHLORIDE: 600; 310; 30; 20 INJECTION, SOLUTION INTRAVENOUS at 04:26

## 2022-08-23 RX ADMIN — SODIUM CHLORIDE, POTASSIUM CHLORIDE, SODIUM LACTATE AND CALCIUM CHLORIDE: 600; 310; 30; 20 INJECTION, SOLUTION INTRAVENOUS at 05:16

## 2022-08-23 RX ADMIN — Medication 3 ML: at 05:26

## 2022-08-23 ASSESSMENT — ACTIVITIES OF DAILY LIVING (ADL)
ADLS_ACUITY_SCORE: 18
ADLS_ACUITY_SCORE: 35
ADLS_ACUITY_SCORE: 18

## 2022-08-23 NOTE — ANESTHESIA PREPROCEDURE EVALUATION
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  [facility].   LGSIL, + HR HPV. F/u pap pp.      Pes planus 2006    Overview:  IMO Update 10 2016     Polycystic kidney disease 2015     Retained complete placenta 2016    Removed manually and currettaged. No excess blood loss noted      Scoliosis (and kyphoscoliosis), idiopathic 2005    Overview:  Mild  Replacing diagnoses that were inactivated after the 10/1/2021 regulatory import.     Tobacco abuse       Past Surgical History:   Procedure Laterality Date     ESOPHAGOSCOPY, GASTROSCOPY, DUODENOSCOPY (EGD), COMBINED N/A 6/10/2021    Procedure: upper endoscopy with biopsy;  Surgeon: Arturo Silva MD;  Location: [facility] OR     KNEE SURGERY       LAPAROSCOPIC APPENDECTOMY  2013    Procedure: LAPAROSCOPIC APPENDECTOMY;  LAPAROSCOPIC APPENDECTOMY;  Surgeon: Paula Roberts MD;  Location: [facility] OR      No Known Allergies   Social History     Tobacco Use     Smoking status: Former Smoker     Packs/day: 0.50     Years: 14.00     Pack years: 7.00     Types: Cigarettes     Start date: 2006     Quit date: 2020     Years since quittin.6     Smokeless tobacco: Never Used   Substance Use Topics     Alcohol use: No     Alcohol/week: 5.0 standard drinks     Types: 6 Cans of beer  per week     Comment: occasionally when she goes out' past hx heavier consumption      Wt Readings from Last 1 Encounters:   08/19/22 84.4 kg (186 lb 1.6 oz)        Anesthesia Evaluation   Pt has had prior anesthetic. Type: Regional, General and MAC.        ROS/MED HX  ENT/Pulmonary: Comment: Former smoker       Neurologic:       Cardiovascular:       METS/Exercise Tolerance:     Hematologic:       Musculoskeletal:   (+) scoliosis,     GI/Hepatic:     (+) hepatitis     Renal/Genitourinary:       Endo: Comment: Hx acute pancreatitis  Hx polycystic kidney disease       Psychiatric/Substance Use:     (+) psychiatric history depression     Infectious Disease:       Malignancy:       Other:            Physical Exam    Airway        Mallampati: II   TM distance: > 3 FB   Neck ROM: full   Mouth opening: > 3 cm    Respiratory Devices and Support         Dental  no notable dental history         Cardiovascular   cardiovascular exam normal          Pulmonary   pulmonary exam normal            Other findings: Platelets >1000    OUTSIDE LABS:  CBC:   Lab Results   Component Value Date    WBC 16.3 (H) 08/23/2022    WBC 12.0 (H) 06/03/2022    HGB 12.5 08/23/2022    HGB 11.6 (L) 06/03/2022    HCT 37.3 08/23/2022    HCT 34.0 (L) 06/03/2022     08/23/2022     06/03/2022     BMP:   Lab Results   Component Value Date     06/28/2022     03/29/2021    POTASSIUM 3.4 06/28/2022    POTASSIUM 3.6 03/29/2021    CHLORIDE 106 06/28/2022    CHLORIDE 107 03/29/2021    CO2 20 06/28/2022    CO2 25 03/29/2021    BUN 8 06/28/2022    BUN 8 03/29/2021    CR 0.54 06/28/2022    CR 0.70 03/29/2021    GLC 96 06/28/2022    GLC 82 03/29/2021     COAGS: No results found for: PTT, INR, FIBR  POC:   Lab Results   Component Value Date    HCG Negative 12/13/2021     HEPATIC:   Lab Results   Component Value Date    ALBUMIN 3.8 03/29/2021    PROTTOTAL 6.9 03/29/2021    ALT 29 03/29/2021    AST 12 03/29/2021    ALKPHOS 44 03/29/2021     BILITOTAL 0.9 03/29/2021     OTHER:   Lab Results   Component Value Date    SWATHI 9.0 06/28/2022    PHOS 3.0 07/13/2016    LIPASE 69 (L) 03/29/2021    TSH 1.64 02/25/2022    T4 0.96 02/25/2022    CRP 23.9 (H) 09/27/2017    SED 5 03/24/2016       Anesthesia Plan    ASA Status:  3      Anesthesia Type: Epidural.              Consents    Anesthesia Plan(s) and associated risks, benefits, and realistic alternatives discussed. Questions answered and patient/representative(s) expressed understanding.    - Discussed:     - Discussed with:  Patient         Postoperative Care            Comments:    Other Comments: Discussed risks and benefits with patient including itching, sore back, infection, hematoma, spinal headache, CV complications, inability to place, nerve damage. Pt wishes to proceed.             SHELLI Mercado CRNA

## 2022-08-23 NOTE — PROGRESS NOTES
Labor Progress Note    Subjective:   Patient has no complaints.    Objective:   BP 97/54   Pulse 85   Temp 97.7  F (36.5  C) (Oral)   Resp 18   LMP 2021   SpO2 95%   Fetal Heart Rate Tracing: reactive and reassuring  Category:1 tracing  Tocometer: frequency q 1-3 minutes  Cervix:   Membranes: clear amniotic fluid   Dilation: 8   Effacement: 90%   Station:-1    Assessment:   1. 31 year old  female at 39w4d with active labor  2. Hx of polycystic kidney disease  3. Depression  4. LGSIL pap  5. Covid in second trimester (05/10/2022)  6. Fetal well being: Category:1  tracing.    Plan:   Continue plan of care.  Anticipate vaginal delivery.    Wilmer Dawson MD  Obstetrics and Gynecology

## 2022-08-23 NOTE — PROGRESS NOTES
Labor Progress Note    Subjective:   Patient comfortable with epidural.    Objective:   /60   Pulse 83   Temp 98  F (36.7  C) (Oral)   Resp 18   LMP 2021   SpO2 99%   Fetal Heart Rate Tracing: reactive and reassuring  Category:1 tracing  Tocometer: frequency q 1-3 minutes  Cervix:   Membranes: AROM clear amniotic fluid   Dilation: 4   Effacement: 80%   Station:-1    Assessment:   1. 31 year old  female at 39w4d with active labor  2. Hx of polycystic kidney disease  3. Depression  4. LGSIL pap  5. Covid in second trimester (05/10/2022)  6. Fetal well being: Category:1  tracing.    Plan:   Continue plan of care.  Anticipate vaginal delivery.    Wilmer Dawson MD  Obstetrics and Gynecology

## 2022-08-23 NOTE — PLAN OF CARE
OB Triage Note  Latasha Fisher  MRN: [id number]  Gestational Age: 39w4d      Latasha Fisher presents for contractions (sign/symptom/concern).      States karolyn since .  Denies LOF, bloody show present  Dr. Dawson notified of arrival and condition.  Oriented patient to surroundings. Call light within reach.     FHT: 125  NST Start Time:0140  NST Stop Time:0200  NST: Reactive .  Uterine Assessment:Contractions: every 2-3 minutes    Plan:    -Initial NST, then fetal/uterine monitoring per MD/patient plan.  -Sterile vaginal exam/sterile speculum exam.  -Nursing education on s/s of labor provided.      Verified with second RN: Ethel HARMAN    Comments:       Keerthi Tobar, RN

## 2022-08-23 NOTE — L&D DELIVERY NOTE
Delivery Summary    Latasha Fisher MRN# [id number]   Age: 31 year old YOB: 1990     ASSESSMENT & PLAN: Please see separate Vaginal Delivery note for details of delivery.    Wilmer Dawson MD  Obstetrics and Gynecology         Kailyn Fisher [[id number]]    Labor Event Times    Labor onset date: 22 Onset time: 10:00 PM   Dilation complete date: 22 Complete time: 10:07 AM   Start pushing date/time: 2022 1012      Labor Length    1st Stage (hrs): 12 (min): 7   2nd Stage (hrs): 0 (min): 14   3rd Stage (hrs): 0 (min): 4      Labor Events     labor?: No   steroids: None  Labor Type: Spontaneous, AROM  Predominate monitoring during 1st stage: intermittent electronic fetal monitoring, continuous electronic fetal monitoring     Antibiotics received during labor?: No     Rupture date/time: 22 0541   Rupture type: Artificial Rupture of Membranes  Fluid color: Clear  Fluid odor: Normal     Augmentation: AROM  Indications for augmentation: Ineffective Contraction Pattern  1:1 continuous labor support provided by?: RN Labor partogram used?: no      Delivery/Placenta Date and Time    Delivery Date: 22 Delivery Time: 10:22 AM   Placenta Date/Time: 2022 10:26 AM  Oxytocin given at the time of delivery: after delivery of placenta  Delivering clinician: Wilmer Dawson MD          Vaginal Counts     Initial count performed by 2 team members:  Two Team Members   Josephine CHARLES RN       [place]a Suture Needles Sponges (RETIRED) Instruments   Initial counts 1  15    Added to count 0 0 0    Relief counts       Final counts 1  15          Placed during labor Accounted for at the end of labor   FSE NA NA   IUPC NA NA   Cervidil NA NA              Final count performed by 2 team members:  Two Team Members   Dr. Samir HINKLE RN       Final count correct?: Yes     Apgars    Living status: Living   1 Minute 5 Minute 10 Minute 15 Minute 20 Minute   Skin color: 1  1       Heart  "rate: 2  2       Reflex irritability: 2  2       Muscle tone: 2  2       Respiratory effort: 2  2       Total: 9  9       Apgars assigned by: KYLEE SELLERS RN     Cord    Vessels: 3 Vessels    Cord Complications: None               Cord Blood Disposition: Lab    Gases Sent?: No    Delayed cord clamping?: Yes    Cord Clamping Delay (seconds): 31-60 seconds    Stem cell collection?: No        Resuscitation    Methods: None  Chancellor Care at Delivery: Viable baby boy born via  with Dr. Dawson in attendance. Babe bulb suctioned at perineum by MD. Lusty cry noted Cord clamped and cut by MD. Babe placed skin to skin with mom. Warm blankets placed. 1 min APGAR 9. WEINER freely.  Dried and stimulated and hat placed. Babe pinking up with acrocyanosis noted. ID bands placed. HR 150s and RR 50s. Lusty cry noted. 5 min APGAR 9. New warm blankets applied.     Chancellor Measurements    Weight: 7 lb 2.1 oz Length: 1' 7.88\"   Head circumference: 33 cm Chest circumference: 32 cm   Abdominal girth: 32 cm     Skin to Skin and Feeding Plan    Skin to skin initiation date/time: 1841    Skin to skin with: Mother  Skin to skin end date/time:     Breastfeeding initiated date/time: 2022 1032     Labor Events and Shoulder Dystocia    Fetal Tracing Prior to Delivery: Category 1  Shoulder dystocia present?: Neg     Delivery (Maternal) (Provider to Complete) (364110)    Episiotomy: None  Perineal lacerations: None    Repair suture: None  Genital tract inspection done: Pos     Blood Loss  Mother: Latasha Fisher P #[id number]   Start of Mother's Information    Delivery Blood Loss  22 2200 - 22 0748    Postpartum QBL (mL) Hospital Encounter 40 mL    Total  40 mL         End of Mother's Information  Mother: Latasha Fisher P #[id number]          Delivery - Provider to Complete ([id number])    Delivering clinician: Wilmer Dawson MD  Attempted Delivery Types (Choose all that apply): Spontaneous Vaginal Delivery  Delivery Type " (Choose the 1 that will go to the Birth History): Vaginal, Spontaneous                                 Placenta    Date/Time: 8/23/2022 10:26 AM  Removal: Expressed  Disposition: Hospital disposal           Anesthesia    Method: Epidural  Cervical dilation at placement: 4-7                Presentation and Position    Presentation: Vertex     Occiput Anterior                 Wilmer Dawson MD

## 2022-08-23 NOTE — PLAN OF CARE
Vaginal Delivery Note   of viable Male.  Nursery RN Ingris present.  Infant with spontaneous cry, to mother's abdomen, dried and stimulated. Lilian cares provided.  Mother and baby in stable condition. Baby skin-to-skin with mom. ID bands placed on infant, mom and grandmother.    Fundal and vital checks per orders. Fundus firm without massage, at umbilicus. Perineum swelling, ice on. Lilian care done with fundus checks. Patient denying pain. RN continuous in room during 2 hour recovery. VSS.

## 2022-08-23 NOTE — L&D DELIVERY NOTE
Name: Latasha Fisher  Age: 31 year old  YOB: 1990   Medical Record #: [id number]     [facility] Vaginal Delivery Note    Date: 2022   Pre-delivery Diagnosis: 1. 31 year old  female at 39w4d with active labor  2. Hx of polycystic kidney disease  3. Depression  4. LGSIL pap  5. Covid in second trimester (05/10/2022)   Post-delivery Diagnosis: 1. Same.    2. Delivered a viable male infant.   Induction of Labor: None.   Delivery Method/ Type: Spontaneous Vaginal Delivery   Provider: Wilmer Dawson MD   Anesthesia: Epidural anesthesia   Qualitative Blood Loss: 40 ml.   Laceration/ Episiotomy: None.   Complications: None.   Specimens: None.   Findings: Viable  male infant in occiput anterior position. Apgars were 9 / 9. Weight was 3235 grams. Amniotic fluid was clear. There was good cry at cord clamp. Placenta delivered intact with a three-vessel cord.   Disposition: Patient in stable condition, stayed in delivery room. Infant in stable condition to the nursery.   Times: Delivery of Infant: 1022    Delivery of Placenta: 1026     Description of Procedure:  Latasha Fisher is a 31 year old  with Estimated Date of Delivery: Data Unavailable who was admitted at 39w4d. The labor was spontaneous.  She was dilated to 3 cm upon admission.  She progressed normally.  Rupture of membranes was artificial. The fetal heart rate tracing was category number 1, and was reassuring during the intrapartum episode. In active labor, her contractions were regular and firm to palpation. Relaxation of the uterus was noted between contractions. She achieved complete cervical dilatation.  She entered a normal second stage labor pattern. She pushed effectively. She delivered a vigorous live born infant by normal spontaneous vaginal delivery without excessive traction nor intrapartum complications. The delivery was complicated by none. The infant delivered in occiput anterior position. The nose  and mouth were suctioned. The infant's anterior shoulder delivered, followed by delivery of posterior shoulder and then delivery of the rest of the infant in the usual fashion. No resistance was noted at delivery of the shoulders. The baby was active and initiated a good cry. The umbilical cord was clamped and cut after 60 seconds. The baby was placed on the mother's abdomen. Cord blood was obtained for evaluation. The placenta delivered spontaneously and it was inspected and found to be intact. It contained a three-vessel cord. Clots were evacuated from the uterus and vagina. The uterus was firm immediately upon delivery of the placenta. Bimanual massage was performed and Pitocin was given with delivery of the placenta. Bleeding post-delivery was appropriate. The fundus was firm to palpation. The cervix, vagina and perineum were examined with finding of no laceration(s). There was good hemostasis noted at the conclusion of the procedure. Rectal exam confirmed that the rectal sphincter and mucosa were intact. The patient was cleaned. Sponge and needle counts were correct. At the conclusion of delivery and repair, Mother and baby were doing well and stable in the postpartum unit. The baby stayed with mother.  The mother stayed in delivery room.    Wilmer Dawson MD  Obstetrics and Gynecology

## 2022-08-23 NOTE — ANESTHESIA PROCEDURE NOTES
Epidural catheter Procedure Note    Pre-Procedure   Staff -        CRNA: Ryan Russell APRN CRNA       Performed By: CRNA       Location: OB       Procedure Start/Stop Times: 8/23/2022 5:25 AM and 8/23/2022 5:30 AM       Pre-Anesthestic Checklist: patient identified, IV checked, risks and benefits discussed, informed consent, monitors and equipment checked, pre-op evaluation and at physician/surgeon's request  Timeout:       Correct Patient: Yes        Correct Procedure: Yes        Correct Site: Yes        Correct Position: Yes   Procedure Documentation  Procedure: epidural catheter       Diagnosis: labor pain       Patient Position: sitting       Patient Prep/Sterile Barriers: sterile gloves, mask, patient draped       Skin prep: Betadine       Local skin infiltrated with 3 mL of 1% lidocaine.        Insertion Site: L3-4. (midline approach).       Technique: LORT saline        CARL at 8 cm.       Needle Type: Touhy needle       Needle Gauge: 18.        Needle Length (Inches): 3.5           Catheter threaded easily.         4 cm epidural space.         Threaded 12 cm at skin.         # of attempts: 1 and  # of redirects:  2    Assessment/Narrative         Paresthesias: Resolved.       Test dose of 3 mL lidocaine 1.5% w/ 1:200,000 epinephrine at 05:26 CDT.         Test dose negative, 3 minutes after injection, for signs of intravascular, subdural, or intrathecal injection.       Insertion/Infusion Method: LORT saline    Medication(s) Administered   Medication Administration Time: 8/23/2022 5:25 AM

## 2022-08-23 NOTE — PLAN OF CARE
Labor Admission  Latasha Fisher  MRN: [id number]  Gestational Age: 39w4d      Latasha Fisher is admitted for active labor.  States karolyn since .  Rates pain at 10/10. Bloody show, no LOF  Dr. Dawson aware of arrival and condition and Intrapartum orders initiated.      FHT: 125  NST: Reactive .  Uterine Assessment: Karolyn every 2 min     Patient is alert and oriented X 3,Patient oriented to room, unit, hourly rounding, and plan of care.  Call light within reach. Explained admission packet with patient bill of rights brochure. Will continue to monitor and document as needed.     Inpatient nursing criteria listed below was met:    Health care directives status obtained and documented: Yes     Core Measure diagnosis present:: No  Vaccine assessment done and vaccines ordered if appropriate. Yes  Clergy visit ordered if patient requests: N/A  Skin issues/needs documented:N/A  Isolation needs addressed, if appropriate: N/A  Fall Prevention (Med and High risk): Care plan updated, Education given and documented and signage used: Yes  Care Plan initiated: Yes  Education Documented (Reminder to educate patient if MRSA is present on admission): Yes  Education Assessment documented:Yes  Patient has discharge needs (If yes, please explain): No

## 2022-08-23 NOTE — H&P
OB Labor and Delivery History and Physical    Name: Latasha Fisher  Age: 31 year old  YOB: 1990   Medical Record #: [id number]     Date of Admission:  2022  Admitting Service:  Obstetrics and Gynecology  Primary Provider:   Maximus Kemp    DATE: 2022         Identification:   Latasha Fisher is a 31 year old  female at 39w4d with Estimated Date of Delivery: Aug 26, 2022         Chief Complaint:   She is being admitted for active labor management.         History of Present Illness:   The OB prenatal record was reviewed with the patient. Briefly, this patient's pregnancy was complicated by Hx of polycystic kidney disease, Depression, LGSIL pap, Covid in second trimester (05/10/2022).    She is admitted today because of active labor management. She feels good fetal movement.  She has regular contractions every 1-3 minutes that are getting stronger.  She denies pelvic pressure.  She denies leaking of fluid.  She denies vaginal bleeding.    She denies abnormal vaginal discharge, itching or irritation. No difficulty urinating, dysuria, hematuria, or flank pain. Denies nausea or vomiting. No fever or chills. She denies headache, vision changes, lower extremity swelling, upper abdominal pain, chest pain, or shortness of breath.  She denies depression symptoms on medication    Fetal Movement: Present and active.  Leakage of Fluid: Absent.  Vaginal Bleeding: Absent.  Contractions: As above.  Preeclampsia Signs / Symptoms: Absent.         Review of Systems:   As per the HPI. Other systems are reviewed and negative.         Allergies:   No Known Allergies         Medication Prior to Admission:     Medications Prior to Admission   Medication Sig Dispense Refill Last Dose     ondansetron (ZOFRAN) 4 MG tablet Take 1 tablet (4 mg) by mouth every 8 hours as needed for nausea 40 tablet 3 More than a month at Unknown time     Prenatal Vit-Fe Fumarate-FA (PRENATAL VITAMINS) 28-0.8 MG TABS Take 1  tablet by mouth daily   8/22/2022 at Unknown time     sertraline (ZOLOFT) 50 MG tablet Take 1 tablet (50 mg) by mouth daily (Patient taking differently: Take 25 mg by mouth daily) 90 tablet 1 8/22/2022 at 0800            Active Problem List:     Patient Active Problem List   Diagnosis     Polycystic kidney disease     Notalgia     Pes planus     Autosomal dominant polycystic kidney disease     Scoliosis (and kyphoscoliosis), idiopathic     ACP (advance care planning)     Encounter for supervision of low-risk pregnancy in first trimester     Nausea and vomiting during pregnancy     Encounter for triage in pregnant patient     Normal labor     Moderate episode of recurrent major depressive disorder (H)     Infection due to 2019 novel coronavirus          Past Medical History:     Past Medical History:   Diagnosis Date     Acute pancreatitis child    post treuma, from a fall     Autosomal dominant polycystic kidney disease 10/03/2013     Hepatitis remote    transient, probably from alcohol     Infection due to 2019 novel coronavirus 05/10/2022     Introital dyspareunia 07/18/2017     Moderate episode of recurrent major depressive disorder (H) 08/23/2022     Notalgia 08/25/2006    Overview:  IMO Update 10/11     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  [facility].   LGSIL, + HR HPV. F/u pap pp.      Pes planus 08/25/2006    Overview:  IMO Update 10 2016     Polycystic kidney disease 05/27/2015     Retained complete placenta 01/04/2016    Removed manually and currettaged. No excess blood loss noted      Scoliosis (and kyphoscoliosis), idiopathic 07/26/2005    Overview:  Mild  Replacing diagnoses that were inactivated after the 10/1/2021 regulatory import.     Tobacco abuse           Past Surgical History:     Past Surgical History:   Procedure Laterality Date     ESOPHAGOSCOPY, GASTROSCOPY,  DUODENOSCOPY (EGD), COMBINED N/A 6/10/2021    Procedure: upper endoscopy with biopsy;  Surgeon: Arturo Silva MD;  Location: [facility] OR     KNEE SURGERY       LAPAROSCOPIC APPENDECTOMY  2013    Procedure: LAPAROSCOPIC APPENDECTOMY;  LAPAROSCOPIC APPENDECTOMY;  Surgeon: Paula Roberts MD;  Location: [facility] OR            Obstetric History:   EDC: Estimated Date of Delivery: Aug 26, 2022  OB History    Para Term  AB Living   3 2 2 0 0 2   SAB IAB Ectopic Multiple Live Births   0 0 0 0 2      # Outcome Date GA Lbr Isaac/2nd Weight Sex Delivery Anes PTL Lv   3 Current            2 Term 16 40w1d 02:27 / 00:29 3.17 kg (6 lb 15.8 oz) M Vag-Spont INT N AMY      Apgar1: 9  Apgar5: 9   1 Term 10/29/10 40w2d  4.054 kg (8 lb 15 oz) M Vag-Spont INT N AMY      Name: Chava      Obstetric Comments   Breast and bottle           Family History:   Unchanged from prenatal record.         Social History:     Social History     Tobacco Use     Smoking status: Former Smoker     Packs/day: 0.50     Years: 14.00     Pack years: 7.00     Types: Cigarettes     Start date: 2006     Quit date: 2020     Years since quittin.6     Smokeless tobacco: Never Used   Substance Use Topics     Alcohol use: No     Alcohol/week: 5.0 standard drinks     Types: 6 Cans of beer per week     Comment: occasionally when she goes out' past hx heavier consumption     History   Sexual Activity     Sexual activity: Not Currently     Partners: Male     Birth control/ protection: Injection          Physical Exam:   Vital signs:  /69 (BP Location: Left arm, Patient Position: Right side, Cuff Size: Adult Regular)   Pulse 83   Temp 98  F (36.7  C) (Oral)   Resp 18   LMP 2021   SpO2 97%   General: Alert and oriented x 3. Well developed and well nourished gravid female.  Cardiovascular: Regular rate and rhythm. No significant murmurs, rubs, or gallops detected. Peripheral pulses normal bilaterally in upper and lower  extremities.  Lungs: Clear to auscultation bilaterally, no adventitious sounds, good air movement throughout.  Abdomen gravid, soft, nontender.  Cervix:   Membranes: intact   Dilation: 3   Effacement: 50%   Station:-2   Consistency: average   Position: Posterior  Presentation:Cephalic  Extremities: no clubbing, cyanosis, or edema.  Estimated fetal weight: 7 1/2 lbs.    Fetal Heart Rate Tracing: reactive and reassuring  Tocometer: frequency q 1-3 minutes        Diagnostics:   PRENATAL LABS:  Type and Rh: A Positive  Antibody screen: Negative  Rubella: Positive  Treponema: Negative  Hepatitis B surface antigen: Negative  Hepatitis C: Negative  HIV: Negative  TSH: 1.64  Pap: LGSIL  Diabetic Screen: 97  GBS culture: Negative    CBC Results  Recent Labs   Lab Test 22  0316   WBC 16.3*   RBC 4.08   HGB 12.5   HCT 37.3   MCV 91        2022 Covid: pending  2022 Treponema: pending    RADIOLOGY:  2022 OB Ultrasound: OB ultrasound: 31 5/7 wk, 1788 gm, 33%, cephalic presentation, fundal placenta, normal EFRAÍN, appropriate interval fetal growth.         Assessment:   1. 31 year old  female at 39w4d with active labor  2. Hx of polycystic kidney disease  3. Depression  4. LGSIL pap  5. Covid in second trimester (05/10/2022)        Plan:   1. Admit.  2. Routine intrapartum care and monitoring per protocol.  3. Plan pitocin augmentation of labor if needed.  4. Plan AROM when able and if appropriate with favorable cervix exam.  5. Discussed pain medication options.  6. Anticipate vaginal delivery.    The risks, benefits, and alternatives have been fully discussed with the patient. She desires to proceed.    Wilmer Dawosn MD  Obstetrics and Gynecology

## 2022-08-23 NOTE — PLAN OF CARE
Labor Shift Note  Data: See Flowsheets activity for contraction and fetal documentation.   Vitals:    22 0545 22 0555 22 0600 22 0601   BP: 122/67 115/74 127/82    BP Location:       Patient Position:       Cuff Size:       Pulse:       Resp:       Temp:       TempSrc:       SpO2: 96% 95%  96%   . Signs and symptoms of infection Absent.    Complications in labor: Absent. Support person Elo present.  Interventions: Continue uterine/fetal assessment per orders and nursing discretion. Vital Signs per order set. Comfort measures/pain control/labor management: Frequent position changes to facilitate labor with epidural anesthesia.  Plan: Anticipate . Provide labor/coping assistance as needed by patient and support person.  Observe for and notify care provider of indications of progressing labor, need for pain medications, or signs of fetal/maternal compromise.

## 2022-08-23 NOTE — PLAN OF CARE
Labor Shift Note  Data: See Flowsheets activity for contraction and fetal documentation.   Vitals:    22 0726 22 0728 22 0731 22 0736   BP:  97/54     BP Location:       Patient Position:       Cuff Size:       Pulse:       Resp:       Temp:       TempSrc:       SpO2: 96%  94% 95%   . Signs and symptoms of infection Absent.    Complications in labor: Absent. Support person, mother, present.  Interventions: Continue uterine/fetal assessment per orders and nursing discretion. Vital Signs per order set. Comfort measures/pain control/labor management: Frequent position changes to facilitate labor with epidural anesthesia.  Close observation  Plan: Anticipate . Provide labor/coping assistance as needed by patient and support person.  Observe for and notify care provider of indications of progressing labor, need for pain medications, or signs of fetal/maternal compromise.

## 2022-08-23 NOTE — PLAN OF CARE
Assessments completed as charted. B/P: 114/58, T: 98.7, P: 73, R: 16. Rates pain: 0/10 denies. Voiding without difficulty. Fundus: Midline , firm, at umbilicus. Lochia: Light. Activity: unrestricted with out pain . Infant feeding: Breast feeding going well.           Postpartum breastfeeding assessment completed and education provided, see Patient Education Activity.  Items included in the education are:     proper positioning and latch    effectiveness of feeding    manual expression    handling and storing breastmilk    maintenance of breastfeeding for the first 6 months    sign/symptoms of infant feeding issues requiring referral to qualified health care provider  Postpartum care education provided, see Patient Education activity. Patient denies needs. Will monitor.  Asuncion Diop RN

## 2022-08-24 VITALS
DIASTOLIC BLOOD PRESSURE: 62 MMHG | SYSTOLIC BLOOD PRESSURE: 114 MMHG | OXYGEN SATURATION: 97 % | HEART RATE: 92 BPM | RESPIRATION RATE: 16 BRPM | TEMPERATURE: 97.7 F

## 2022-08-24 LAB
HGB BLD-MCNC: 10.7 G/DL (ref 11.7–15.7)
T PALLIDUM AB SER QL: NONREACTIVE

## 2022-08-24 PROCEDURE — 36415 COLL VENOUS BLD VENIPUNCTURE: CPT | Performed by: OBSTETRICS & GYNECOLOGY

## 2022-08-24 PROCEDURE — 250N000011 HC RX IP 250 OP 636: Performed by: OBSTETRICS & GYNECOLOGY

## 2022-08-24 PROCEDURE — 85018 HEMOGLOBIN: CPT | Performed by: OBSTETRICS & GYNECOLOGY

## 2022-08-24 PROCEDURE — 90471 IMMUNIZATION ADMIN: CPT | Performed by: OBSTETRICS & GYNECOLOGY

## 2022-08-24 PROCEDURE — 90715 TDAP VACCINE 7 YRS/> IM: CPT | Performed by: OBSTETRICS & GYNECOLOGY

## 2022-08-24 PROCEDURE — 250N000013 HC RX MED GY IP 250 OP 250 PS 637: Performed by: OBSTETRICS & GYNECOLOGY

## 2022-08-24 RX ORDER — PRENATAL VIT/IRON FUM/FOLIC AC 27MG-0.8MG
1 TABLET ORAL DAILY
Qty: 90 TABLET | Refills: 3 | Status: SHIPPED | OUTPATIENT
Start: 2022-08-24 | End: 2023-04-23

## 2022-08-24 RX ORDER — HYDROXYZINE HYDROCHLORIDE 25 MG/1
50 TABLET, FILM COATED ORAL
Status: CANCELLED | OUTPATIENT
Start: 2022-08-24

## 2022-08-24 RX ORDER — ACETAMINOPHEN 325 MG/1
650 TABLET ORAL EVERY 4 HOURS PRN
Qty: 100 TABLET | Refills: 0 | Status: SHIPPED | OUTPATIENT
Start: 2022-08-24

## 2022-08-24 RX ORDER — ONDANSETRON 4 MG/1
4 TABLET, ORALLY DISINTEGRATING ORAL
Status: CANCELLED | OUTPATIENT
Start: 2022-08-24

## 2022-08-24 RX ORDER — OXYCODONE HYDROCHLORIDE 5 MG/1
5-10 TABLET ORAL EVERY 6 HOURS PRN
Qty: 20 TABLET | Refills: 0 | Status: SHIPPED | OUTPATIENT
Start: 2022-08-24 | End: 2022-08-24

## 2022-08-24 RX ORDER — DOCUSATE SODIUM 100 MG/1
100 CAPSULE, LIQUID FILLED ORAL 2 TIMES DAILY
Qty: 60 CAPSULE | Refills: 0 | Status: SHIPPED | OUTPATIENT
Start: 2022-08-24 | End: 2022-10-14

## 2022-08-24 RX ORDER — OXYCODONE HYDROCHLORIDE 5 MG/1
5 TABLET ORAL
Status: CANCELLED | OUTPATIENT
Start: 2022-08-24

## 2022-08-24 RX ORDER — IBUPROFEN 800 MG/1
800 TABLET, FILM COATED ORAL EVERY 6 HOURS PRN
Qty: 100 TABLET | Refills: 0 | Status: SHIPPED | OUTPATIENT
Start: 2022-08-24

## 2022-08-24 RX ADMIN — SERTRALINE HYDROCHLORIDE 25 MG: 25 TABLET ORAL at 09:20

## 2022-08-24 RX ADMIN — CLOSTRIDIUM TETANI TOXOID ANTIGEN (FORMALDEHYDE INACTIVATED), CORYNEBACTERIUM DIPHTHERIAE TOXOID ANTIGEN (FORMALDEHYDE INACTIVATED), BORDETELLA PERTUSSIS TOXOID ANTIGEN (GLUTARALDEHYDE INACTIVATED), BORDETELLA PERTUSSIS FILAMENTOUS HEMAGGLUTININ ANTIGEN (FORMALDEHYDE INACTIVATED), BORDETELLA PERTUSSIS PERTACTIN ANTIGEN, AND BORDETELLA PERTUSSIS FIMBRIAE 2/3 ANTIGEN 0.5 ML: 5; 2; 2.5; 5; 3; 5 INJECTION, SUSPENSION INTRAMUSCULAR at 09:22

## 2022-08-24 RX ADMIN — IBUPROFEN 800 MG: 800 TABLET, FILM COATED ORAL at 09:20

## 2022-08-24 RX ADMIN — IBUPROFEN 800 MG: 800 TABLET, FILM COATED ORAL at 02:36

## 2022-08-24 RX ADMIN — DOCUSATE SODIUM 100 MG: 100 CAPSULE ORAL at 09:20

## 2022-08-24 ASSESSMENT — ACTIVITIES OF DAILY LIVING (ADL)
ADLS_ACUITY_SCORE: 18

## 2022-08-24 NOTE — CARE PLAN
Assessments completed as charted. B/P: 112/57, T: 97.9, P: 73, R: 16. Rates pain: 0/10 denies. Voiding without difficulty. Fundus: Midline firm. Lochia: Light. Activity: unrestricted with out pain  and normal activity. Infant feeding: Breast feeding going well.           Postpartum breastfeeding assessment completed and education provided, see Patient Education Activity.  Items included in the education are:     proper positioning and latch    effectiveness of feeding    manual expression    handling and storing breastmilk    maintenance of breastfeeding for the first 6 months    sign/symptoms of infant feeding issues requiring referral to qualified health care provider  Postpartum care education provided, see Patient Education activity. Patient denies needs. Will monitor.  Ethel Perez RN

## 2022-08-24 NOTE — PLAN OF CARE
Patient discharged at 3:45 PM, but remains on the unit as a boarder to care for her . Prescriptions sent to patients preferred pharmacy.      Discharge instructions reviewed with patient.     Core Measures and Vaccines  Core Measures applicable during stay: N/A  Pneumonia and Influenza given prior to discharge, if indicated: N/A    Surgical Patient   Surgical Procedures during stay: N/A  Did patient receive discharge instruction on wound care and recognition of infection symptoms? Yes    MISC  Follow up appointment made:  Yes  Home and hospital aquired medications returned to patient: N/A  Patient reports pain was well managed at discharge: Yes

## 2022-08-24 NOTE — CARE PLAN
Face to face report given with opportunity to observe patient.    Report given to Whitley HARMAN.    Ethel Perez RN   8/24/2022  7:11 AM

## 2022-08-24 NOTE — DISCHARGE SUMMARY
OB VAGINAL BIRTH DISCHARGE SUMMARY    Name: Latasha Fisher  Age: 31 year old  YOB: 1990   Medical Record #: [id number]     Date of Admission:  2022  Date of Discharge:  2022  Admitting Physician:  Wilmer Dawson MD  Discharge Physician:  Wilmer Dawson MD  Discharging Service:  Obstetrics and Gynecology     Primary Provider:   Maximus Kemp          Admission Diagnoses:     Encounter for triage in pregnant patient [Z36.89]  Normal labor [O80, Z37.9]          Discharge Diagnosis:     1. 31 year old  female at 39w4d, now delivered.  2. PPD# 1 s/p Spontaneous vaginal delivery, doing well.  3. Nursing  4. Depression             Discharge Disposition:     Discharged to home.           Condition on Discharge:     Discharge condition: Stable   Discharge vitals: /57   Pulse 73   Temp 97.9  F (36.6  C) (Oral)   Resp 16   LMP 2021   SpO2 98%   Breastfeeding Unknown    Code status on discharge: Full Code          Brief History of Illness:     Latasha Fisher is a 31 year old female who was admitted for labor. Please see her admit H&P for full details of her PMH, PSH, Meds, Allergies and exam on admission.          Procedure Performed:     Normal spontaneous vaginal delivery  Epidural analgesia         Infant Delivery Information:     39w4d at time of delivery.  Delivery Date: 2022  Sex: male  Weight: 3235 gm  APGAR 1 min: 9  APGAR 5 min: 9         Hospital Course:     Latasha Fisher is a 31 year old female who was admitted to the hospital for the above listed indications. Her labor progressed well. She had AROM. She had a spontaneous vaginal delivery. Please see her Delivery Summary for full details regarding her delivery. She had an uncomplicated postpartum course. Breastfeeding well. Infant is stable. She will be discharged home in good condition on PPD# 1. Her hemoglobin is 10.7. Her Rh status is positive, and Rhogam was not indicated.          Post-Partum  Complications:     None.         Contraception:     The patient undecided for contraception. To be discussed at Postpartum visit.         Medications Prior to Admission:     Medications Prior to Admission   Medication Sig Dispense Refill Last Dose     ondansetron (ZOFRAN) 4 MG tablet Take 1 tablet (4 mg) by mouth every 8 hours as needed for nausea 40 tablet 3 More than a month at Unknown time     Prenatal Vit-Fe Fumarate-FA (PRENATAL VITAMINS) 28-0.8 MG TABS Take 1 tablet by mouth daily   8/22/2022 at Unknown time     sertraline (ZOLOFT) 50 MG tablet Take 1 tablet (50 mg) by mouth daily (Patient taking differently: Take 25 mg by mouth daily) 90 tablet 1 8/22/2022 at 0800             Discharge Medications:     Current Discharge Medication List      START taking these medications    Details   acetaminophen (TYLENOL) 325 MG tablet Take 2 tablets (650 mg) by mouth every 4 hours as needed for mild pain, fever or headaches  Qty: 100 tablet, Refills: 0    Associated Diagnoses: Normal labor      docusate sodium (COLACE) 100 MG capsule Take 1 capsule (100 mg) by mouth 2 times daily  Qty: 60 capsule, Refills: 0    Associated Diagnoses: Normal labor      ibuprofen (ADVIL/MOTRIN) 800 MG tablet Take 1 tablet (800 mg) by mouth every 6 hours as needed for other, moderate pain or fever (cramping)  Qty: 100 tablet, Refills: 0    Associated Diagnoses: Normal labor         CONTINUE these medications which have NOT CHANGED    Details   ondansetron (ZOFRAN) 4 MG tablet Take 1 tablet (4 mg) by mouth every 8 hours as needed for nausea  Qty: 40 tablet, Refills: 3    Associated Diagnoses: Nausea and vomiting during pregnancy      Prenatal Vit-Fe Fumarate-FA (PRENATAL VITAMINS) 28-0.8 MG TABS Take 1 tablet by mouth daily      sertraline (ZOLOFT) 50 MG tablet Take 1 tablet (50 mg) by mouth daily  Qty: 90 tablet, Refills: 1    Associated Diagnoses: Depression during pregnancy in third trimester                   Consultations:     No  consultations were requested during this admission          Significant Results:     None.             Pending Results:     None.           Discharge Instructions and Follow-Up:     Discharge Diet: Regular   Discharge Activity: Increase activity as tolerated. No vigorous activity or exercise for 2 weeks.    Pelvic rest for 6 weeks which includes intercourse, douching and tampons.   Discharge Nursing: Nursing is encouraged.    Schedule outpatient lactation follow up in 2-4 days.   Discharge Instructions: The warning signs of postpartum depression have been reviewed, and the patient is aware that this can be a common occurrence. She is encouraged to seek immediate medical evaluation and assistance for any questions or concerns.    Instructions on avoiding constipation and straining are discussed. The patient is advised in the appropriate use of stool softeners to avoid constipation.    The patient will call the OB/GYN Clinic Nurse at [phone] for problems such as fever (temperature >100.4), chills, pain not controlled by usual oral pain medications, persistent nausea and vomiting, constipation, difficulty nursing, heavy odorous vaginal discharge, burning or pain associated with urination.  Also call for excessive vaginal bleeding, saturating more than one pad an hour for more than three consecutive hours.   Discharge Follow-up: Follow up for outpatient lactation appointment in 2-4 days if desired.    Follow up for postpartum exam in 2 weeks with Dr. Dawson or Latosha Kathleen NP.    Follow up for postpartum exam in 6 weeks with Dr. Johnson.    Follow up with Primary Care Provider as scheduled for management of active medical problems and for adult preventive services.    Call Dr. Dawson at the OB/GYN Clinic at [phone] as necessary if you have problems in the interim.     Total time spent for discharge on date of discharge: 40 minutes  I saw the patient on the date of discharge.    Wilmer Dawson MD  Obstetrics and  Gynecology

## 2022-08-24 NOTE — ANESTHESIA POSTPROCEDURE EVALUATION
Patient: Latasha Fisher    Procedure: * No procedures listed *       Anesthesia Type:  Epidural    Note:  Disposition: Inpatient   Postop Pain Control: Uneventful            Sign Out: Well controlled pain   PONV: No   Neuro/Psych: Uneventful            Sign Out: Acceptable/Baseline neuro status   Airway/Respiratory: Uneventful            Sign Out: Acceptable/Baseline resp. status   CV/Hemodynamics: Uneventful            Sign Out: Acceptable CV status; No obvious hypovolemia; No obvious fluid overload   Other NRE: NONE   DID A NON-ROUTINE EVENT OCCUR? No           Last vitals:  Vitals:    08/23/22 1917 08/23/22 2250 08/24/22 0748   BP:  112/57 114/62   Pulse:   92   Resp:  16 16   Temp:  97.9  F (36.6  C) 97.7  F (36.5  C)   SpO2: 98% 98% 97%       Electronically Signed By: SHELLI Mercado CRNA  August 24, 2022  8:42 AM

## 2022-08-24 NOTE — PLAN OF CARE
Face to face report given with opportunity to observe patient.    Report given to Ethel Diop RN   8/23/2022  7:11 PM

## 2022-08-24 NOTE — PROGRESS NOTES
Name: Latasha Fisher  Age: 31 year old  YOB: 1990   Medical Record #: [id number]     Fetal Non-Stress Test Results    NST Ordered By: Wilmer Dawson MD  NST Medical Indication: r/o labor  Gestational Age: 39w4d       NST Start & Stop Times  NST Start Time: 0140  NST Stop Time: 0200    NST Results  Fetus A   Baseline Rate: 120  Variability: Present  Accelerations: Present  Decelerations: None  Interpretation: reactive and reassuring   Category:1            Wilmer Dawson MD  Obstetrics and Gynecology

## 2022-08-24 NOTE — ANESTHESIA CARE TRANSFER NOTE
Patient: Latasha Fisher    Procedure: * No procedures listed *       Diagnosis: * No pre-op diagnosis entered *  Diagnosis Additional Information: No value filed.    Anesthesia Type:   Epidural     Note:    Oropharynx: spontaneously breathing  Level of Consciousness: awake  Oxygen Supplementation: room air    Independent Airway: airway patency satisfactory and stable  Dentition: dentition unchanged  Vital Signs Stable: post-procedure vital signs reviewed and stable  Report to RN Given: handoff report given  Patient transferred to: Labor and Delivery    Handoff Report: Identifed the Patient, Identified the Reponsible Provider, Reviewed the pertinent medical history, Discussed the surgical course, Reviewed Intra-OP anesthesia mangement and issues during anesthesia, Set expectations for post-procedure period and Allowed opportunity for questions and acknowledgement of understanding      Vitals:  Vitals Value Taken Time   BP     Temp     Pulse     Resp     SpO2         Electronically Signed By: SHELLI Mercado CRNA  August 24, 2022  8:40 AM

## 2022-08-24 NOTE — PROGRESS NOTES
Name: Latasha Fisher  Age: 31 year old  YOB: 1990   Medical Record #: [id number]     Postpartum Day 1: Vaginal delivery    DATE: 2022  SUBJECTIVE:  Patient is doing well. Her pain is well controlled with current medications. She reports normal lochia. She is tolerating a regular diet without nausea. She is ambulating and voiding. She is passing flatus. The patient denies depression symptoms. She denies headache or vision changes, shortness of breath, or chest pain. She denies fever or chills. The baby is well. She is nursing. She desires discharge..    OBJECTIVE:  /57   Pulse 73   Temp 97.9  F (36.6  C) (Oral)   Resp 16   LMP 2021   SpO2 98%   Breastfeeding Unknown     Well developed and well nourished female in no apparent distress. Alert and oriented. Lungs are clear to auscultation bilaterally. Heart is regular rate and rhythm. Abdomen is nondistended with positive bowel sounds. Uterine fundus is firm and palpated below the umbilicus. Perineum is clean, dry and intact. Extremities no edema, non-tender.    LABS :  Type and Rh: A Positive  Antibody screen: Negative  Rubella: Positive  Treponema: Negative  Hepatitis B surface antigen: Negative  Hepatitis C: Negative  HIV: Negative  TSH: 1.64  Pap: LGSIL  Diabetic Screen: 97  GBS culture: Negative    CBC Results (Last 2)  Recent Labs   Lab Test 22  0607 22  0316 22  1647   WBC  --  16.3* 12.0*   RBC  --  4.08 3.63*   HGB 10.7* 12.5 11.6*   HCT  --  37.3 34.0*   MCV  --  91 94   PLT  --  292 215     2022 Covid negative  2022 Treponema pending    IMPRESSION :  1. 31 year old  female at 39w4d, now delivered.  2. PPD# 1 s/p Spontaneous vaginal delivery, doing well.  3. Nursing  4. Depression    PLAN:  1. Continue routine post-partum care. Continue Zoloft.  2. Encourage nursing.  3. Reviewed some of the home instructions in anticipation of discharge; these include being aware of warning signs of  postpartum depression, symptoms of infection, avoiding constipation, iron replacement, and the need to seek medical evaluation for any questions or concerns.  4. Options for birth control have been reviewed and a final decision and prescription for that will occur in the postpartum clinic visit.  5. Repeat Serology / Treponema Pallidum Antibody Test obtained per [facility] protocol.  6. Anticipate discharge today on PPD# 1 in stable condition.     Wilmer Dawson MD  Obstetrics and Gynecology

## 2022-09-04 ENCOUNTER — HEALTH MAINTENANCE LETTER (OUTPATIENT)
Age: 32
End: 2022-09-04

## 2022-09-06 DIAGNOSIS — Z78.9 BREASTFEEDING (INFANT): Primary | ICD-10-CM

## 2022-09-16 ENCOUNTER — PRENATAL OFFICE VISIT (OUTPATIENT)
Dept: OBGYN | Facility: OTHER | Age: 32
End: 2022-09-16
Attending: OBSTETRICS & GYNECOLOGY
Payer: MEDICAID

## 2022-09-16 VITALS
WEIGHT: 165 LBS | BODY MASS INDEX: 27.49 KG/M2 | SYSTOLIC BLOOD PRESSURE: 112 MMHG | DIASTOLIC BLOOD PRESSURE: 70 MMHG | HEIGHT: 65 IN | HEART RATE: 64 BPM

## 2022-09-16 PROBLEM — U07.1 INFECTION DUE TO 2019 NOVEL CORONAVIRUS: Status: RESOLVED | Noted: 2022-05-10 | Resolved: 2022-09-16

## 2022-09-16 PROBLEM — Z36.89 ENCOUNTER FOR TRIAGE IN PREGNANT PATIENT: Status: RESOLVED | Noted: 2022-08-23 | Resolved: 2022-09-16

## 2022-09-16 PROBLEM — O21.9 NAUSEA AND VOMITING DURING PREGNANCY: Status: RESOLVED | Noted: 2022-02-07 | Resolved: 2022-09-16

## 2022-09-16 PROBLEM — Z37.9 NORMAL LABOR: Status: RESOLVED | Noted: 2022-08-23 | Resolved: 2022-09-16

## 2022-09-16 PROCEDURE — 99207 PR POST PARTUM EXAM: CPT | Performed by: OBSTETRICS & GYNECOLOGY

## 2022-09-16 PROCEDURE — G0463 HOSPITAL OUTPT CLINIC VISIT: HCPCS

## 2022-09-16 ASSESSMENT — ANXIETY QUESTIONNAIRES
7. FEELING AFRAID AS IF SOMETHING AWFUL MIGHT HAPPEN: NOT AT ALL
GAD7 TOTAL SCORE: 4
3. WORRYING TOO MUCH ABOUT DIFFERENT THINGS: SEVERAL DAYS
6. BECOMING EASILY ANNOYED OR IRRITABLE: SEVERAL DAYS
5. BEING SO RESTLESS THAT IT IS HARD TO SIT STILL: NOT AT ALL
GAD7 TOTAL SCORE: 4
2. NOT BEING ABLE TO STOP OR CONTROL WORRYING: NOT AT ALL
1. FEELING NERVOUS, ANXIOUS, OR ON EDGE: SEVERAL DAYS
IF YOU CHECKED OFF ANY PROBLEMS ON THIS QUESTIONNAIRE, HOW DIFFICULT HAVE THESE PROBLEMS MADE IT FOR YOU TO DO YOUR WORK, TAKE CARE OF THINGS AT HOME, OR GET ALONG WITH OTHER PEOPLE: NOT DIFFICULT AT ALL

## 2022-09-16 ASSESSMENT — PATIENT HEALTH QUESTIONNAIRE - PHQ9
SUM OF ALL RESPONSES TO PHQ QUESTIONS 1-9: 1
5. POOR APPETITE OR OVEREATING: SEVERAL DAYS

## 2022-09-16 ASSESSMENT — PAIN SCALES - GENERAL: PAINLEVEL: NO PAIN (0)

## 2022-09-16 NOTE — NURSING NOTE
"Chief Complaint   Patient presents with     Postpartum Care       Initial /70   Pulse 64   Ht 1.651 m (5' 5\")   Wt 74.8 kg (165 lb)   LMP 11/19/2021   BMI 27.46 kg/m   Estimated body mass index is 27.46 kg/m  as calculated from the following:    Height as of this encounter: 1.651 m (5' 5\").    Weight as of this encounter: 74.8 kg (165 lb).  Medication Reconciliation: complete  Radha Abdul LPN    "

## 2022-09-16 NOTE — PROGRESS NOTES
POSTPARTUM VISIT    REASON FOR VISIT / CHEIF COMPLAINT  Early postpartum exam.    HISTORY OF PRESENT ILLNESS  Latasha Fisher is a 32 year old  female who is 3 weeks status post normal spontaneous vaginal delivery on 2022 delivering a healthy baby boy. She presents for early postpartum visit today. She is doing well and has no complaints.  She is nursing exclusively. Her infant is doing well.    Delivery complications: No.  Breast feeding: Yes.  Bladder problems: No.  Bowel problems / hemorrhoids: No.  Episiotomy / laceration / incision healed: No.  Vaginal lochia: None.  Menses since delivery: No.  [place] since delivery: No.  Emotional adjustment: doing well and happy.  Depression symptoms or suicide thoughts: No.  Post delivery pain: No.  Contraception plans: Undecided.  Other concerns: No.    ALLERGIES   No Known Allergies    MEDICATIONS    Current Outpatient Medications:      acetaminophen (TYLENOL) 325 MG tablet, Take 2 tablets (650 mg) by mouth every 4 hours as needed for mild pain, fever or headaches, Disp: 100 tablet, Rfl: 0     docusate sodium (COLACE) 100 MG capsule, Take 1 capsule (100 mg) by mouth 2 times daily, Disp: 60 capsule, Rfl: 0     ibuprofen (ADVIL/MOTRIN) 800 MG tablet, Take 1 tablet (800 mg) by mouth every 6 hours as needed for other, moderate pain or fever (cramping), Disp: 100 tablet, Rfl: 0     Prenatal Vit-Fe Fumarate-FA (PRENATAL MULTIVITAMIN W/IRON) 27-0.8 MG tablet, Take 1 tablet by mouth daily, Disp: 90 tablet, Rfl: 3     sertraline (ZOLOFT) 50 MG tablet, Take 1 tablet (50 mg) by mouth daily (Patient taking differently: Take 25 mg by mouth daily), Disp: 90 tablet, Rfl: 1     vitamin D3 (CHOLECALCIFEROL) 1.25 MG (89537 UT) capsule, Take 1 capsule (50,000 Units) by mouth every 7 days, Disp: 4 capsule, Rfl: 2    REVIEW OF SYSTEMS  As per HPI, otherwise negative.    The patient's Medical Hx, Surgical Hx, Obstetrical Hx, Social Hx, and Family Hx have been reviewed and  "updated in the electronic medical record.    OBJECTIVE  /70   Pulse 64   Ht 1.651 m (5' 5\")   Wt 74.8 kg (165 lb)   LMP 2021   BMI 27.46 kg/m      General:  Well-developed, well-nourished female in no apparent distress.  Neurological: Alert and oriented x3.  Breast: Deferred.  Abdomen: Soft, nontender, nondistended, positive bowel sounds.  No organomegaly. No rebound, no guarding. Fundus is firm and nontender above pubic symphysis.  Pelvic exam: Deferred.  Extremities:  No clubbing cyanosis or edema. Nontender bilaterally.    DIAGNOSTICS  PHQ-9 score:   PHQ 2022   PHQ-9 Total Score 1   Q9: Thoughts of better off dead/self-harm past 2 weeks Not at all       ASSESSMENT / PLAN  Latasha Fisher is a 32 year old  female who is 3 weeks status post normal spontaneous vaginal delivery on 2022.    1 Normal early postpartum exam after vaginal delivery  - The patient is making excellent postpartum progress.  - Routine postpartum precautions, recommendations and restrictions are reviewed with the patient.  - Routine postpartum depression precautions are reviewed with the patient.  - I recommend that the patient refrain from intercourse for 6-8 weeks after delivery.  I recommend the patient be on reliable contraception before resuming intercourse.    2 Contraception counseling  - I discussed contraception options available to the patient including oral contraceptive pills both continuous and cyclic, progestin only oral contraceptive pills, Ortho Evra patch, NuvaRing, Depo-Provera injections, Nexplanon, Mirena IUD, ParaGard IUD, Elysia IUD and condoms.  I reviewed the risks, benefits, alternatives, indications and side effects of each of these contraception options with the patient.  - The patient is undecided.  - All questions were answered.  - Contraceptive compliance was emphasized.    3 Nursing  - Nursing is encouraged.  - Follow up with outpatient lactation appointment as needed.    4 " Depression  - Continue Zoloft.  - Depression precautions are reviewed with the patient.    - All of the patient's questions were answered.  - Problem list reviewed and updated.  - Follow up in 4 weeks for postpartum visit including physical examination, pelvic examination and Pap smear as needed.    Wilmer Dawson MD  Obstetrics and Gynecology

## 2022-10-14 ENCOUNTER — PRENATAL OFFICE VISIT (OUTPATIENT)
Dept: OBGYN | Facility: OTHER | Age: 32
End: 2022-10-14
Attending: OBSTETRICS & GYNECOLOGY
Payer: COMMERCIAL

## 2022-10-14 VITALS
OXYGEN SATURATION: 97 % | HEIGHT: 65 IN | DIASTOLIC BLOOD PRESSURE: 60 MMHG | HEART RATE: 57 BPM | BODY MASS INDEX: 27.99 KG/M2 | SYSTOLIC BLOOD PRESSURE: 100 MMHG | WEIGHT: 168 LBS

## 2022-10-14 DIAGNOSIS — R87.612 LOW GRADE SQUAMOUS INTRAEPITHELIAL LESION (LGSIL) ON PAPANICOLAOU SMEAR OF CERVIX: Primary | ICD-10-CM

## 2022-10-14 DIAGNOSIS — Z30.011 ENCOUNTER FOR INITIAL PRESCRIPTION OF CONTRACEPTIVE PILLS: ICD-10-CM

## 2022-10-14 PROCEDURE — G0463 HOSPITAL OUTPT CLINIC VISIT: HCPCS | Mod: 25

## 2022-10-14 PROCEDURE — 87624 HPV HI-RISK TYP POOLED RSLT: CPT | Mod: ZL | Performed by: OBSTETRICS & GYNECOLOGY

## 2022-10-14 PROCEDURE — G0463 HOSPITAL OUTPT CLINIC VISIT: HCPCS

## 2022-10-14 PROCEDURE — 88142 CYTOPATH C/V THIN LAYER: CPT | Mod: ZL | Performed by: OBSTETRICS & GYNECOLOGY

## 2022-10-14 PROCEDURE — 99207 PR POST PARTUM EXAM: CPT | Performed by: OBSTETRICS & GYNECOLOGY

## 2022-10-14 RX ORDER — ACETAMINOPHEN AND CODEINE PHOSPHATE 120; 12 MG/5ML; MG/5ML
0.35 SOLUTION ORAL DAILY
Qty: 90 TABLET | Refills: 3 | Status: SHIPPED | OUTPATIENT
Start: 2022-10-14 | End: 2023-01-18

## 2022-10-14 ASSESSMENT — PAIN SCALES - GENERAL: PAINLEVEL: NO PAIN (0)

## 2022-10-14 NOTE — PROGRESS NOTES
"SUBJECTIVE:  Latasha Fisher is a 32 year old female P3 here for a postpartum visit.  She had a   delivering a healthy baby boy  Currently no complaints and doing well.    Today's Depression Rating was 3    delivery complications:  No  breast feeding:  Yes  bladder problems:  No  bowel problems/hemorrhoids:  No  episiotomy/laceration/incision healed? NA  vaginal flow:  None  [place]:  No  contraception:  abstinence  emotional adjustment:  doing well and happy      OBJECTIVE:  Blood pressure 100/60, pulse 57, height 1.651 m (5' 5\"), weight 76.2 kg (168 lb), last menstrual period 2021, SpO2 97 %, unknown if currently breastfeeding.   General - pleasant female in no acute distress.    Abdomen - soft, nontender, nondistended, no hepatosplenomegaly.  Pelvic - EG: normal adult female, BUS: within normal limits, Vagina: well rugated, no discharge, Cervix: no lesions or CMT, Uterus: firm, normal sized and nontender, Adnexae: no masses or tenderness.  Rectovaginal - deferred.    ASSESSMENT:  normal postpartum exam.  Released from pregnancy related restrictions    PLAN:  Discussed kegel exercises   May resume normal activities without restrictions  Pap smear Done today  (h/o LGSIL/HR HPV)    The patient will use oral contraceptive for birth control. Full counseling was provided, and all questions answered. Compliance is strongly emphasized.   Pt has my card and phone number to call as needed if problems in the interim or she does not hear her results. Plan repeat colposcopy if pap/HPV abnormal.  If Pap/HPV nml repeat 1 yr.       Abiodun Johnson MD  "

## 2022-10-14 NOTE — NURSING NOTE
"Chief Complaint   Patient presents with     Postpartum Care     6 wk pp,  22, Boy-Carl, LPS 22 Abnormal with positive HPV       Initial /60 (BP Location: Left arm, Patient Position: Chair, Cuff Size: Adult Regular)   Pulse 57   Ht 1.651 m (5' 5\")   Wt 76.2 kg (168 lb)   LMP 2021   SpO2 97%   BMI 27.96 kg/m   Estimated body mass index is 27.96 kg/m  as calculated from the following:    Height as of this encounter: 1.651 m (5' 5\").    Weight as of this encounter: 76.2 kg (168 lb).  Medication Reconciliation: complete  BRII GIORDANO LPN    "

## 2022-10-19 LAB
BKR LAB AP GYN ADEQUACY: NORMAL
BKR LAB AP GYN INTERPRETATION: NORMAL
BKR LAB AP HPV REFLEX: NORMAL
BKR LAB AP PREVIOUS ABNL DX: NORMAL
BKR LAB AP PREVIOUS ABNORMAL: NORMAL
PATH REPORT.COMMENTS IMP SPEC: NORMAL
PATH REPORT.COMMENTS IMP SPEC: NORMAL
PATH REPORT.RELEVANT HX SPEC: NORMAL

## 2022-10-20 ENCOUNTER — MYC MEDICAL ADVICE (OUTPATIENT)
Dept: OBGYN | Facility: OTHER | Age: 32
End: 2022-10-20

## 2022-10-20 NOTE — TELEPHONE ENCOUNTER
I am sorry, you are correct.  When your results came up it showed me your last pap smear instead of the current one.  We are waiting for HPV results still. .  If that is + we would need to see you back for colposcopy.  If negative repeat Pap in 1 year.

## 2022-11-09 ENCOUNTER — MEDICAL CORRESPONDENCE (OUTPATIENT)
Dept: HEALTH INFORMATION MANAGEMENT | Facility: HOSPITAL | Age: 32
End: 2022-11-09

## 2022-11-10 ENCOUNTER — OFFICE VISIT (OUTPATIENT)
Dept: OBGYN | Facility: OTHER | Age: 32
End: 2022-11-10
Attending: OBSTETRICS & GYNECOLOGY
Payer: COMMERCIAL

## 2022-11-10 VITALS
SYSTOLIC BLOOD PRESSURE: 102 MMHG | BODY MASS INDEX: 28.16 KG/M2 | OXYGEN SATURATION: 98 % | WEIGHT: 169 LBS | TEMPERATURE: 98 F | HEIGHT: 65 IN | HEART RATE: 87 BPM | DIASTOLIC BLOOD PRESSURE: 68 MMHG | RESPIRATION RATE: 16 BRPM

## 2022-11-10 DIAGNOSIS — R87.810 CERVICAL HIGH RISK HPV (HUMAN PAPILLOMAVIRUS) TEST POSITIVE: ICD-10-CM

## 2022-11-10 DIAGNOSIS — Z11.1 SCREENING EXAMINATION FOR PULMONARY TUBERCULOSIS: Primary | ICD-10-CM

## 2022-11-10 PROCEDURE — 88305 TISSUE EXAM BY PATHOLOGIST: CPT | Mod: TC | Performed by: OBSTETRICS & GYNECOLOGY

## 2022-11-10 PROCEDURE — 57455 BIOPSY OF CERVIX W/SCOPE: CPT | Performed by: OBSTETRICS & GYNECOLOGY

## 2022-11-10 PROCEDURE — 88305 TISSUE EXAM BY PATHOLOGIST: CPT | Mod: 26 | Performed by: PATHOLOGY

## 2022-11-10 PROCEDURE — 86481 TB AG RESPONSE T-CELL SUSP: CPT | Mod: ZL | Performed by: OBSTETRICS & GYNECOLOGY

## 2022-11-10 PROCEDURE — 57421 EXAM/BIOPSY OF VAG W/SCOPE: CPT | Performed by: OBSTETRICS & GYNECOLOGY

## 2022-11-10 PROCEDURE — 36415 COLL VENOUS BLD VENIPUNCTURE: CPT | Mod: ZL | Performed by: OBSTETRICS & GYNECOLOGY

## 2022-11-10 PROCEDURE — G0463 HOSPITAL OUTPT CLINIC VISIT: HCPCS | Mod: 25

## 2022-11-10 ASSESSMENT — PAIN SCALES - GENERAL: PAINLEVEL: NO PAIN (0)

## 2022-11-10 NOTE — PROGRESS NOTES
"Latasha Fisher is a 32 year old female P3   who presents for abnormal pap smear evaluation.    Pap smear 1 months ago showed: normal and with high risk HPV present: (other). The prior pap showed LGSIL and with high risk HPV present: (other).   This will be her repeat colposcopy.    Patient's last menstrual period was 11/03/2022.   UPT today is not done      Past GYN history:  HPV  Prior cervical treatment: no treatment.  Tobacco use:No    PMH, Fam Hx, Soc Hx Reviewed.    ROS:  Neg GI/    PROCEDURE:  Before the procedure, it was ensured that the patient was educated regarding the nature of her findings to date, the implications, and what was to be done. She has been made aware of the role of HPV, the natural history of infection, ways to minimize her future risk, the effect of HPV on the cervix, and treatment options available should they be indicated.  The details of the colposcopic procedure were reviewed.  All questions were answered before proceeding, and informed consent was therefore obtained.    Speculum placed in vagina and excellent visualization of cervix   acheived, cervix swabbed x 3 with acetic acid solution.    FINDINGS:  EXAM:  Blood pressure 102/68, pulse 87, temperature 98  F (36.7  C), temperature source Tympanic, resp. rate 16, height 1.651 m (5' 5\"), weight 76.7 kg (169 lb), last menstrual period 11/03/2022, SpO2 98 %, currently breastfeeding.  BMI= Body mass index is 28.12 kg/m .  Patient's last menstrual period was 11/03/2022.  General - pleasant female in no acute distress.  Neurological - alert and oriented X 3  Psychiatric - normal mood and affect    Pelvic-  Cervix: acetowhitening noted circumferentially around transformation zone c/w squamous metaplasia.      Pap repeated?:  No  SCJ seen?:  yes    ECC done?:  No  Lugol's solution used?:  Yes   Satisfactory examination?:  yes      ASSESSMENT: HPV/squamouts metaplasia related changes.  R/o dysplasia.    PLAN: specimens labelled and sent to " Pathology, will base further treatment on Pathology findings, treatment options discussed with patient, post biopsy instructions given to patient and call to discuss Pathology results    Discussed cervical dysplasia/HPV, importance of follow up.  Handouts and my card and phone number given to patient.  She will call if she has not heard results within 2 weeks.    Abiodun Johnson MD

## 2022-11-10 NOTE — NURSING NOTE
"Chief Complaint   Patient presents with     Colposcopy       Initial /68   Pulse 87   Temp 98  F (36.7  C) (Tympanic)   Resp 16   Ht 1.651 m (5' 5\")   Wt 76.7 kg (169 lb)   LMP 11/03/2022   SpO2 98%   Breastfeeding Yes   BMI 28.12 kg/m   Estimated body mass index is 28.12 kg/m  as calculated from the following:    Height as of this encounter: 1.651 m (5' 5\").    Weight as of this encounter: 76.7 kg (169 lb).  Medication Reconciliation: complete  Negar Sandhu LPN    "

## 2022-11-12 LAB
GAMMA INTERFERON BACKGROUND BLD IA-ACNC: 0.03 IU/ML
M TB IFN-G BLD-IMP: NEGATIVE
M TB IFN-G CD4+ BCKGRND COR BLD-ACNC: 9.97 IU/ML
MITOGEN IGNF BCKGRD COR BLD-ACNC: 0.04 IU/ML
MITOGEN IGNF BCKGRD COR BLD-ACNC: 0.07 IU/ML
QUANTIFERON MITOGEN: 10 IU/ML
QUANTIFERON NIL TUBE: 0.03 IU/ML
QUANTIFERON TB1 TUBE: 0.1 IU/ML
QUANTIFERON TB2 TUBE: 0.07

## 2022-11-16 LAB
PATH REPORT.COMMENTS IMP SPEC: NORMAL
PATH REPORT.FINAL DX SPEC: NORMAL
PATH REPORT.GROSS SPEC: NORMAL
PATH REPORT.MICROSCOPIC SPEC OTHER STN: NORMAL
PATH REPORT.RELEVANT HX SPEC: NORMAL
PHOTO IMAGE: NORMAL

## 2022-12-05 DIAGNOSIS — Z78.9 BREASTFEEDING (INFANT): ICD-10-CM

## 2022-12-07 RX ORDER — CHOLECALCIFEROL (VITAMIN D3) 1250 MCG
CAPSULE ORAL
Qty: 4 CAPSULE | Refills: 0 | Status: SHIPPED | OUTPATIENT
Start: 2022-12-07 | End: 2023-01-23

## 2022-12-07 NOTE — TELEPHONE ENCOUNTER
Vit D3      Last Written Prescription Date:  9/6/22  Last Fill Quantity: 4,   # refills: 2  Last Office Visit: 12/13/21  Future Office visit:

## 2023-01-18 ENCOUNTER — OFFICE VISIT (OUTPATIENT)
Dept: OBGYN | Facility: OTHER | Age: 33
End: 2023-01-18
Attending: OBSTETRICS & GYNECOLOGY
Payer: COMMERCIAL

## 2023-01-18 VITALS
RESPIRATION RATE: 16 BRPM | DIASTOLIC BLOOD PRESSURE: 62 MMHG | SYSTOLIC BLOOD PRESSURE: 102 MMHG | TEMPERATURE: 98 F | WEIGHT: 170 LBS | HEART RATE: 68 BPM | BODY MASS INDEX: 28.32 KG/M2 | HEIGHT: 65 IN

## 2023-01-18 DIAGNOSIS — Z78.9 BREASTFEEDING (INFANT): ICD-10-CM

## 2023-01-18 DIAGNOSIS — Z30.011 ENCOUNTER FOR INITIAL PRESCRIPTION OF CONTRACEPTIVE PILLS: Primary | ICD-10-CM

## 2023-01-18 PROBLEM — Z34.91 ENCOUNTER FOR SUPERVISION OF LOW-RISK PREGNANCY IN FIRST TRIMESTER: Status: RESOLVED | Noted: 2022-02-07 | Resolved: 2023-01-18

## 2023-01-18 PROCEDURE — 99213 OFFICE O/P EST LOW 20 MIN: CPT | Performed by: OBSTETRICS & GYNECOLOGY

## 2023-01-18 PROCEDURE — G0463 HOSPITAL OUTPT CLINIC VISIT: HCPCS

## 2023-01-18 RX ORDER — NORGESTIMATE AND ETHINYL ESTRADIOL 0.25-0.035
1 KIT ORAL DAILY
Qty: 84 TABLET | Refills: 3 | Status: SHIPPED | OUTPATIENT
Start: 2023-01-18 | End: 2023-10-17

## 2023-01-18 ASSESSMENT — PAIN SCALES - GENERAL: PAINLEVEL: NO PAIN (0)

## 2023-01-18 NOTE — PROGRESS NOTES
CHIEF COMPLAINT / REASON FOR VISIT  Patient presents for Gynecology visit for contraception.    HISTORY OF PRESENT ILLNESS  Latasha Fisher is a 32 year old  with Patient's last menstrual period was 2023. who presents for contraception.  The patient had a vaginal delivery on 2022 and recently stopped nursing.  She is taking progestin only birth control pill with regular menses once a month that last for 5 days.  For the last several months the patient has irregular dysfunctional uterine bleeding.  She believes the irregular bleeding is secondary to the progesterone only birth control pill I would like to change to another form of contraception.    MENSTRUAL HISTORY  Menarche was at age 12.  Menses: regular q 28-30 days.  Menses last: 5 days.  Heavy menses: Denies.  Intermenstrual bleeding: As above.  Dysmenorrhea: Denies.  She is sexually active and denies issues with intercourse.   Dyspareunia: Denies.  Postcoital spotting: Denies.  Current contraception: Progestin only birth control pill.  STD History: No STD history.  Last Pap smear history: NILM, positive HPV Other  Mammogram history: N/A.    ALLERGIES   No Known Allergies    MEDICATIONS    Current Outpatient Medications:      acetaminophen (TYLENOL) 325 MG tablet, Take 2 tablets (650 mg) by mouth every 4 hours as needed for mild pain, fever or headaches, Disp: 100 tablet, Rfl: 0     cholecalciferol (VITAMIN D3) 1250 mcg (98932 units) capsule, Take 1 capsule by mouth once a week, Disp: 4 capsule, Rfl: 0     ibuprofen (ADVIL/MOTRIN) 800 MG tablet, Take 1 tablet (800 mg) by mouth every 6 hours as needed for other, moderate pain or fever (cramping), Disp: 100 tablet, Rfl: 0     norethindrone (MICRONOR) 0.35 MG tablet, Take 1 tablet (0.35 mg) by mouth daily, Disp: 90 tablet, Rfl: 3     norgestimate-ethinyl estradiol (ORTHO-CYCLEN) 0.25-35 MG-MCG tablet, Take 1 tablet by mouth daily, Disp: 84 tablet, Rfl: 3     Prenatal Vit-Fe Fumarate-FA (PRENATAL  "MULTIVITAMIN W/IRON) 27-0.8 MG tablet, Take 1 tablet by mouth daily, Disp: 90 tablet, Rfl: 3     sertraline (ZOLOFT) 50 MG tablet, Take 1 tablet (50 mg) by mouth daily, Disp: 90 tablet, Rfl: 1    REVIEW OF SYSTEMS  As per HPI, otherwise negative    The patient's Medical Hx, Surgical Hx, Obstetrical Hx, Social Hx, and Family Hx have been reviewed and updated in the electronic medical record.    OBJECTIVE  /62   Pulse 68   Temp 98  F (36.7  C)   Resp 16   Ht 1.651 m (5' 5\")   Wt 77.1 kg (170 lb)   LMP 2023   BMI 28.29 kg/m      General:  Well-developed, well-nourished female in no apparent distress.  Neurological: Alert and oriented x3.  Lungs:  Clear to auscultation bilaterally with good inspiratory effort.  No wheezing rhonchi or rales noted. Breathing nonlabored.  Heart:  Regular rate and rhythm without murmur. No JVD.  No peripheral vascular disease.  Abdomen: Soft, nontender, nondistended, positive bowel sounds.  No organomegaly. No rebound, no guarding.  Pelvic exam: Deferred  Extremities:  No clubbing cyanosis or edema. Nontender bilaterally.     DIAGNOSTICS  10/14/2022 Pap NILM, positive HPV Other    ASSESSMENT / PLAN  Latasha Fisher is a 32 year old  female who presents for contraception counseling.    1 Contraception counseling  2 Dysfunctional uterine bleeding    - I suspect the patient's dysfunctional uterine bleeding is secondary to progestin only birth control pill use and recently weaning from breast-feeding.  - The patient would like to stop progestin only birth control pill and change to another form of contraception.  - I agree recommend the patient stop her current progestin only birth control pill.  - I discussed contraception options available to the patient.  - I discussed combination estrogen and progesterone contraceptives including Oral contraceptive pills, Ortho Evra patch, NuvaRing.  The risk include abnormal uterine bleeding, mood changes, breast tenderness, and " nausea.  Rare risk for estrogen and progesterone combination contraceptives include venous thrombolic embolism and stroke.  - I discussed progestin only contraceptive options including Progestin only birth control pills, Depo-Provera, Nexplanon, Progesterone IUD (Mirena and Elysia).  I reviewed the side effect of irregular abnormal bleeding especially with first use. I also discussed the risk of amenorrhea. Depo-Provera is associated with some minor weight gain as well as decreased bone mineralization with long-term use in the adolescent and young adult population.  Nexplanon is associated with abnormal uterine bleeding and a small risk for implantation site infection and pain. IUD have risk of infection, pain, uterine perforation, increased risk of STD as well as rare risk of ectopic pregnancy.  - I discussed nonhormonal contraception options including Condoms and ParaGard IUD. Paragard IUD can make periods heavier and produce more cramping.  Condoms may require lubrication can be associated with irritation.  Condoms are associated with tearing and falling off especially if placed incorrectly. Condoms also require reliable use to be effective  - I discussed that OCPs, Ortho Evra patch, NuvaRing, Depo Provera injection require regular followup and reliable administration to be effective.  - I discussed that LARCs - progesterone IUD (Mirena and Elysia), Paragard IUD , and Nexplanon require consent with placement in the office and remain effective for 3 years (Nexplanon), 3 years (Elysia) 5 years (Mirena), and 10 years (Paragard). These are non permanent methods and can be removed in the office.  - I discussed the effectiveness of all these contraception options with the patient and offered written information.  - I reviewed the risks, benefits, alternatives, indications and side effects of each of these contraception options with the patient.  - The patient verbalized understanding of her options and would like to  proceed with regular oral contraceptive for birth control.  - I gave the patient a prescription for Ortho-Cyclen 1 p.o. daily, #84 x 3 refill.  I recommended the patient start tomorrow morning and take each morning at the same time.  - If the patient's dysfunctional uterine bleeding symptoms continue after the first couple months of Ortho-Cyclen birth control pills and stopping the progestin only contraception then I recommend she return to clinic for reevaluation.  - All questions were answered.  - Contraceptive compliance was emphasized.    - Problem list reviewed and updated.  - Follow up annually or sooner as needed    Wilmer Dawson MD  Obstetrics and Gynecology

## 2023-01-20 NOTE — TELEPHONE ENCOUNTER
Vitamin d3      Last Written Prescription Date:  12/7/22  Last Fill Quantity: 4,   # refills: 0  Last Office Visit: 12/13/21  Future Office visit:    Next 5 appointments (look out 90 days)    Feb 21, 2023  1:45 PM  (Arrive by 1:30 PM)  SHORT with Maximus Kemp MD  [facility] ([facility] ) [address]  [facility] [place] [address]  [phone]

## 2023-01-23 RX ORDER — CHOLECALCIFEROL (VITAMIN D3) 1250 MCG
CAPSULE ORAL
Qty: 4 CAPSULE | Refills: 0 | Status: SHIPPED | OUTPATIENT
Start: 2023-01-23

## 2023-02-15 ENCOUNTER — MEDICAL CORRESPONDENCE (OUTPATIENT)
Dept: HEALTH INFORMATION MANAGEMENT | Facility: HOSPITAL | Age: 33
End: 2023-02-15

## 2023-03-26 ENCOUNTER — HOSPITAL ENCOUNTER (EMERGENCY)
Facility: HOSPITAL | Age: 33
Discharge: HOME | End: 2023-03-26
Payer: COMMERCIAL

## 2023-03-26 VITALS
DIASTOLIC BLOOD PRESSURE: 64 MMHG | RESPIRATION RATE: 20 BRPM | SYSTOLIC BLOOD PRESSURE: 126 MMHG | TEMPERATURE: 97.3 F | HEART RATE: 79 BPM | OXYGEN SATURATION: 97 %

## 2023-03-26 DIAGNOSIS — M54.50 ACUTE MIDLINE LOW BACK PAIN WITHOUT SCIATICA: ICD-10-CM

## 2023-03-26 PROCEDURE — 96372 THER/PROPH/DIAG INJ SC/IM: CPT

## 2023-03-26 PROCEDURE — G0463 HOSPITAL OUTPT CLINIC VISIT: HCPCS | Mod: 25

## 2023-03-26 PROCEDURE — 250N000011 HC RX IP 250 OP 636

## 2023-03-26 PROCEDURE — 99213 OFFICE O/P EST LOW 20 MIN: CPT

## 2023-03-26 RX ORDER — KETOROLAC TROMETHAMINE 30 MG/ML
30 INJECTION, SOLUTION INTRAMUSCULAR; INTRAVENOUS ONCE
Status: COMPLETED | OUTPATIENT
Start: 2023-03-26 | End: 2023-03-26

## 2023-03-26 RX ORDER — METHOCARBAMOL 750 MG/1
750-1500 TABLET, FILM COATED ORAL EVERY 6 HOURS
Qty: 15 TABLET | Refills: 0 | Status: SHIPPED | OUTPATIENT
Start: 2023-03-26 | End: 2023-03-29

## 2023-03-26 RX ADMIN — KETOROLAC TROMETHAMINE 30 MG: 30 INJECTION, SOLUTION INTRAMUSCULAR; INTRAVENOUS at 21:31

## 2023-03-26 ASSESSMENT — ENCOUNTER SYMPTOMS
FEVER: 0
CHILLS: 0
WEAKNESS: 0
BACK PAIN: 1
SHORTNESS OF BREATH: 0

## 2023-03-26 ASSESSMENT — ACTIVITIES OF DAILY LIVING (ADL): ADLS_ACUITY_SCORE: 35

## 2023-03-27 NOTE — ED TRIAGE NOTES
Patient presents to urgent care for low back pain that she woke up with yesterday. Patient states this happened about a month ago and it eventually got better. Denies any injury to her back. Patient is thinking maybe a chiropractor will help. States ibuprofen doesn't help. She is wearing an icy hot patch with no relief.  No pain at this time.  Tried muscle relaxer's along time ago for her back but can't remember if it helped.

## 2023-03-27 NOTE — ED PROVIDER NOTES
History     Chief Complaint   Patient presents with     Back Pain     HPI  Latasha Fisher is a 32 year old female who presents urgent care with a 2-day history of progressively worsening lower back pain.  States this did happen 1 month ago without provoking injury or activity, resolved with rest and limited physical activity. 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 Over the past 2 days she has been taking Tylenol, ibuprofen, stretching, icing, heating without improvement of symptoms.  Denies recent trauma, numbness, tingling, weakness in lower extremities.  Denies fevers, chills, malaise, loss of bowel or bladder control.    Allergies:  No Known Allergies    Problem List:    Patient Active Problem List    Diagnosis Date Noted     Moderate episode of recurrent major depressive disorder (H) 08/23/2022     Priority: Medium     ACP (advance care planning) 09/27/2017     Priority: Medium     Advance Care Planning 9/27/2017: ACP Review of Chart / Resources Provided:  Reviewed chart for advance care plan.  Latasha Fisher has no plan or code status on file. Discussed available resources and provided with information.   Added by Ania Benitez             Polycystic kidney disease 05/27/2015     Priority: Medium     Autosomal dominant polycystic kidney disease 10/03/2013     Priority: Medium     Notalgia 08/25/2006     Priority: Medium     Overview:   IMO Update 10/11       Pes planus 08/25/2006     Priority: Medium     Overview:   IMO Update 10 2016       Scoliosis (and kyphoscoliosis), idiopathic 07/26/2005     Priority: Medium     Overview:   Mild  Replacing diagnoses that were inactivated after the 10/1/2021 regulatory import.          Past Medical History:    Past Medical History:   Diagnosis Date     Acute pancreatitis child     Autosomal dominant polycystic kidney disease 10/03/2013     Hepatitis remote     Infection due to 2019 novel  coronavirus 05/10/2022     Introital dyspareunia 2017     Moderate episode of recurrent major depressive disorder (H) 2022     Notalgia 2006     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 2008     Papanicolaou smear of cervix with low grade squamous intraepithelial lesion (LGSIL) 2015     Pes planus 2006     Polycystic kidney disease 2015     Retained complete placenta 2016     Scoliosis (and kyphoscoliosis), idiopathic 2005     Tobacco abuse        Past Surgical History:    Past Surgical History:   Procedure Laterality Date     ESOPHAGOSCOPY, GASTROSCOPY, DUODENOSCOPY (EGD), COMBINED N/A 6/10/2021    Procedure: upper endoscopy with biopsy;  Surgeon: Arturo Silva MD;  Location: [facility] OR     KNEE SURGERY       LAPAROSCOPIC APPENDECTOMY  2013    Procedure: LAPAROSCOPIC APPENDECTOMY;  LAPAROSCOPIC APPENDECTOMY;  Surgeon: Paula Roberts MD;  Location: [facility] OR       Family History:    Family History   Problem Relation Age of Onset     Thyroid Disease Mother      Diabetes Father      Asthma Sister      Attention Deficit Disorder Brother        Social History:  Marital Status:  Single [1]  Social History     Tobacco Use     Smoking status: Former     Packs/day: 0.50     Years: 14.00     Pack years: 7.00     Types: Cigarettes     Start date: 2006     Quit date: 2020     Years since quittin.2     Smokeless tobacco: Never   Substance Use Topics     Alcohol use: No     Alcohol/week: 5.0 standard drinks     Types: 6 Cans of beer per week     Comment: occasionally when she goes out' past hx heavier consumption     Drug use: No        Medications:    acetaminophen (TYLENOL) 325 MG tablet  cholecalciferol (VITAMIN D3) 1250 mcg (17887 units) capsule  ibuprofen (ADVIL/MOTRIN) 800 MG tablet  methocarbamol (ROBAXIN) 750 MG tablet  norgestimate-ethinyl estradiol (ORTHO-CYCLEN) 0.25-35 MG-MCG tablet  Prenatal Vit-Fe Fumarate-FA (PRENATAL MULTIVITAMIN W/IRON)  27-0.8 MG tablet  sertraline (ZOLOFT) 50 MG tablet          Review of Systems   Constitutional: Negative for chills and fever.   Respiratory: Negative for shortness of breath.    Musculoskeletal: Positive for back pain. Negative for gait problem.   Neurological: Negative for weakness.   All other systems reviewed and are negative.      Physical Exam   BP: 126/64  Pulse: 79  Temp: 97.3  F (36.3  C)  Resp: 20  SpO2: 97 %      Physical Exam  Constitutional:       General: She is not in acute distress.     Appearance: She is not ill-appearing.   Cardiovascular:      Rate and Rhythm: Normal rate and regular rhythm.   Pulmonary:      Effort: Pulmonary effort is normal.   Musculoskeletal:      Comments: No midline or paraspinal tenderness of thoracic or lumbar spine.  No deformities, crepitus, step-offs.  No overlying erythema, edema, ecchymosis.  Flexion of lumbar spine fully intact with minimal discomfort.  Extension of lumbar spine limited by pain.   Neurological:      Mental Status: She is alert.         ED Course                 Procedures             Critical Care time:               No results found for this or any previous visit (from the past 24 hour(s)).    Medications   ketorolac (TORADOL) injection 30 mg (30 mg Intramuscular $Given 3/26/23 2131)       Assessments & Plan (with Medical Decision Making)     History and physical exam most consistent with acute lumbosacral strain.  Exam does not support epidural abscess, no red flag signs for back pain.  No indication for imaging at this time.  Plan is to treat with Toradol in urgent care, Robaxin every 6 hours as needed for back pain.  Continue with rest, ice, heat, gentle stretching and physical activity as tolerated.  If no improvement over the next 7 days follow-up with primary care, consider physical therapy.    I have reviewed the nursing notes.    I have reviewed the findings, diagnosis, plan and need for follow up with the patient.          Medical Decision  Making  The patient's presentation was of .    The patient's evaluation involved:      The patient's management necessitated .        New Prescriptions    METHOCARBAMOL (ROBAXIN) 750 MG TABLET    Take 1-2 tablets (750-1,500 mg) by mouth every 6 hours for 3 days       Final diagnoses:   Acute midline low back pain without sciatica       3/26/2023   [facility]

## 2023-03-27 NOTE — ED TRIAGE NOTES
Pt presents with lower back pain that she woke up with yesterday. 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 Pain eventually went away but is back again today. Pt denies any recent injury.     Gregory Batres MSN, RN on 3/26/2023 at 8:58 PM

## 2023-03-27 NOTE — DISCHARGE INSTRUCTIONS
Toradol administered in urgent care.  No NSAIDs for 6 hours.  Robaxin 750 to 1500 mg every 6 hours as needed for back pain.  Tylenol 1000 mg 3 times daily for up to 1 week.  Ibuprofen 800 mg 3 times daily for up to 1 week.

## 2023-04-23 ENCOUNTER — HOSPITAL ENCOUNTER (EMERGENCY)
Facility: HOSPITAL | Age: 33
Discharge: HOME | End: 2023-04-23
Payer: COMMERCIAL

## 2023-04-23 VITALS
OXYGEN SATURATION: 99 % | TEMPERATURE: 97.3 F | HEART RATE: 60 BPM | RESPIRATION RATE: 18 BRPM | DIASTOLIC BLOOD PRESSURE: 72 MMHG | SYSTOLIC BLOOD PRESSURE: 120 MMHG | BODY MASS INDEX: 27.15 KG/M2 | WEIGHT: 163.14 LBS

## 2023-04-23 DIAGNOSIS — N30.01 ACUTE CYSTITIS WITH HEMATURIA: ICD-10-CM

## 2023-04-23 LAB
ALBUMIN UR-MCNC: NEGATIVE MG/DL
APPEARANCE UR: ABNORMAL
BACTERIA #/AREA URNS HPF: ABNORMAL /HPF
BILIRUB UR QL STRIP: NEGATIVE
COLOR UR AUTO: ABNORMAL
GLUCOSE UR STRIP-MCNC: NEGATIVE MG/DL
HGB UR QL STRIP: ABNORMAL
KETONES UR STRIP-MCNC: NEGATIVE MG/DL
LEUKOCYTE ESTERASE UR QL STRIP: ABNORMAL
MUCOUS THREADS #/AREA URNS LPF: PRESENT /LPF
NITRATE UR QL: POSITIVE
PH UR STRIP: 7 [PH] (ref 4.7–8)
RBC URINE: 7 /HPF
SP GR UR STRIP: 1.01 (ref 1–1.03)
SQUAMOUS EPITHELIAL: <1 /HPF
UROBILINOGEN UR STRIP-MCNC: NORMAL MG/DL
WBC CLUMPS #/AREA URNS HPF: PRESENT /HPF
WBC URINE: 24 /HPF

## 2023-04-23 PROCEDURE — G0463 HOSPITAL OUTPT CLINIC VISIT: HCPCS

## 2023-04-23 PROCEDURE — 99213 OFFICE O/P EST LOW 20 MIN: CPT

## 2023-04-23 PROCEDURE — 87186 SC STD MICRODIL/AGAR DIL: CPT

## 2023-04-23 PROCEDURE — 81003 URINALYSIS AUTO W/O SCOPE: CPT

## 2023-04-23 RX ORDER — CEPHALEXIN 500 MG/1
500 CAPSULE ORAL 2 TIMES DAILY
Qty: 10 CAPSULE | Refills: 0 | Status: SHIPPED | OUTPATIENT
Start: 2023-04-23 | End: 2023-04-28

## 2023-04-23 RX ORDER — FLUCONAZOLE 150 MG/1
TABLET ORAL
Qty: 2 TABLET | Refills: 0 | Status: SHIPPED | OUTPATIENT
Start: 2023-04-23 | End: 2023-04-26

## 2023-04-23 ASSESSMENT — ENCOUNTER SYMPTOMS
FEVER: 0
FLANK PAIN: 0
CHILLS: 0
FREQUENCY: 1
SHORTNESS OF BREATH: 0
DYSURIA: 1

## 2023-04-23 ASSESSMENT — ACTIVITIES OF DAILY LIVING (ADL): ADLS_ACUITY_SCORE: 35

## 2023-04-23 NOTE — DISCHARGE INSTRUCTIONS
Keflex 500 mg twice daily for 5 days.  Diflucan 150 mg single dose, repeat in 72 hours if symptoms persist.

## 2023-04-23 NOTE — ED PROVIDER NOTES
History     Chief Complaint   Patient presents with     R/O UTI     HPI  Latasha Fisher is a 32 year old female who presents urgent care with a 2-day history of dysuria, urinary frequency, odor.  Currently has her period, does not think she has blood in her urine.  Did take Pyridium yesterday with minimal improvement of symptoms.  Denies associated fevers, chills, malaise, abdominal pain, flank pain, nausea, vomiting.    Allergies:  No Known Allergies    Problem List:    Patient Active Problem List    Diagnosis Date Noted     Moderate episode of recurrent major depressive disorder (H) 08/23/2022     Priority: Medium     ACP (advance care planning) 09/27/2017     Priority: Medium     Advance Care Planning 9/27/2017: ACP Review of Chart / Resources Provided:  Reviewed chart for advance care plan.  Latasha Fisher has no plan or code status on file. Discussed available resources and provided with information.   Added by Ania Benitez             Polycystic kidney disease 05/27/2015     Priority: Medium     Autosomal dominant polycystic kidney disease 10/03/2013     Priority: Medium     Notalgia 08/25/2006     Priority: Medium     Overview:   IMO Update 10/11       Pes planus 08/25/2006     Priority: Medium     Overview:   IMO Update 10 2016       Scoliosis (and kyphoscoliosis), idiopathic 07/26/2005     Priority: Medium     Overview:   Mild  Replacing diagnoses that were inactivated after the 10/1/2021 regulatory import.          Past Medical History:    Past Medical History:   Diagnosis Date     Acute pancreatitis child     Autosomal dominant polycystic kidney disease 10/03/2013     Hepatitis remote     Infection due to 2019 novel coronavirus 05/10/2022     Introital dyspareunia 07/18/2017     Moderate episode of recurrent major depressive disorder (H) 08/23/2022     Notalgia 08/25/2006     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 06/04/2008     Papanicolaou smear of cervix with low grade squamous  intraepithelial lesion (LGSIL) 2015     Pes planus 2006     Polycystic kidney disease 2015     Retained complete placenta 2016     Scoliosis (and kyphoscoliosis), idiopathic 2005     Tobacco abuse        Past Surgical History:    Past Surgical History:   Procedure Laterality Date     ESOPHAGOSCOPY, GASTROSCOPY, DUODENOSCOPY (EGD), COMBINED N/A 6/10/2021    Procedure: upper endoscopy with biopsy;  Surgeon: Arturo Silva MD;  Location: [facility]     KNEE SURGERY       LAPAROSCOPIC APPENDECTOMY  2013    Procedure: LAPAROSCOPIC APPENDECTOMY;  LAPAROSCOPIC APPENDECTOMY;  Surgeon: Paula Roberts MD;  Location: [facility]       Family History:    Family History   Problem Relation Age of Onset     Thyroid Disease Mother      Diabetes Father      Asthma Sister      Attention Deficit Disorder Brother        Social History:  Marital Status:  Single [1]  Social History     Tobacco Use     Smoking status: Former     Packs/day: 0.50     Years: 14.00     Pack years: 7.00     Types: Cigarettes     Start date: 2006     Quit date: 2020     Years since quittin.3     Smokeless tobacco: Never   Substance Use Topics     Alcohol use: No     Alcohol/week: 5.0 standard drinks of alcohol     Types: 6 Cans of beer per week     Comment: occasionally when she goes out' past hx heavier consumption     Drug use: No        Medications:    acetaminophen (TYLENOL) 325 MG tablet  cephALEXin (KEFLEX) 500 MG capsule  fluconazole (DIFLUCAN) 150 MG tablet  ibuprofen (ADVIL/MOTRIN) 800 MG tablet  sertraline (ZOLOFT) 50 MG tablet  cholecalciferol (VITAMIN D3) 1250 mcg (94155 units) capsule  norgestimate-ethinyl estradiol (ORTHO-CYCLEN) 0.25-35 MG-MCG tablet          Review of Systems   Constitutional: Negative for chills and fever.   Respiratory: Negative for shortness of breath.    Genitourinary: Positive for dysuria, frequency and urgency. Negative for flank pain and vaginal discharge.   All other systems  reviewed and are negative.      Physical Exam   BP: 120/72  Pulse: 60  Temp: 97.3  F (36.3  C)  Resp: 18  Weight: 74 kg (163 lb 2.3 oz)  SpO2: 99 %      Physical Exam  Constitutional:       General: She is not in acute distress.     Appearance: She is not ill-appearing.   Cardiovascular:      Rate and Rhythm: Normal rate and regular rhythm.   Pulmonary:      Effort: Pulmonary effort is normal.   Abdominal:      General: Abdomen is flat.      Palpations: Abdomen is soft.      Tenderness: There is no right CVA tenderness or left CVA tenderness.   Skin:     General: Skin is warm and dry.   Neurological:      Mental Status: She is alert.         ED Course                 Procedures             Critical Care time:               Results for orders placed or performed during the hospital encounter of 04/23/23 (from the past 24 hour(s))   UA with Microscopic reflex to Culture    Specimen: Urine, Midstream   Result Value Ref Range    Color Urine Light Yellow Colorless, Straw, Light Yellow, Yellow    Appearance Urine Slightly Cloudy (A) Clear    Glucose Urine Negative Negative mg/dL    Bilirubin Urine Negative Negative    Ketones Urine Negative Negative mg/dL    Specific Gravity Urine 1.013 1.003 - 1.035    Blood Urine Trace (A) Negative    pH Urine 7.0 4.7 - 8.0    Protein Albumin Urine Negative Negative mg/dL    Urobilinogen Urine Normal Normal, 2.0 mg/dL    Nitrite Urine Positive (A) Negative    Leukocyte Esterase Urine Moderate (A) Negative    Bacteria Urine Few (A) None Seen /HPF    WBC Clumps Urine Present (A) None Seen /HPF    Mucus Urine Present (A) None Seen /LPF    RBC Urine 7 (H) <=2 /HPF    WBC Urine 24 (H) <=5 /HPF    Squamous Epithelials Urine <1 <=1 /HPF    Narrative    Urine Culture ordered based on laboratory criteria       Medications - No data to display    Assessments & Plan (with Medical Decision Making)     Urinalysis shows leukocyte Estrace and nitrates with trace blood.  Supports urinary tract  infection.  Exam does not support pyelonephritis.  Plan is to treat with Keflex 500 mg twice daily for 5 days.  Diflucan 150 mg initially, repeat after 3 days if symptoms persist.  If symptoms persist or worsen return to urgent care.    I have reviewed the nursing notes.    I have reviewed the findings, diagnosis, plan and need for follow up with the patient.          Medical Decision Making  The patient's presentation was of .    The patient's evaluation involved:      The patient's management necessitated .        New Prescriptions    CEPHALEXIN (KEFLEX) 500 MG CAPSULE    Take 1 capsule (500 mg) by mouth 2 times daily for 5 days    FLUCONAZOLE (DIFLUCAN) 150 MG TABLET    Take one tablet now, and one tablet in three days       Final diagnoses:   Acute cystitis with hematuria       4/23/2023   [facility]

## 2023-04-23 NOTE — ED TRIAGE NOTES
Patient presents to urgent care for possible UTI. Patient's symptoms are urgency, odor, frequency and burning. Patient reports symptoms started 2 days ago. Patient took pyridium yesterday morning.

## 2023-04-25 LAB — BACTERIA UR CULT: ABNORMAL

## 2023-05-02 ENCOUNTER — HOSPITAL ENCOUNTER (EMERGENCY)
Facility: HOSPITAL | Age: 33
Discharge: HOME | End: 2023-05-02
Attending: PHYSICIAN ASSISTANT | Admitting: PHYSICIAN ASSISTANT
Payer: COMMERCIAL

## 2023-05-02 VITALS
OXYGEN SATURATION: 99 % | SYSTOLIC BLOOD PRESSURE: 142 MMHG | TEMPERATURE: 97.3 F | DIASTOLIC BLOOD PRESSURE: 83 MMHG | RESPIRATION RATE: 16 BRPM | HEART RATE: 54 BPM

## 2023-05-02 DIAGNOSIS — N39.0 UTI (URINARY TRACT INFECTION): ICD-10-CM

## 2023-05-02 LAB
ALBUMIN UR-MCNC: 20 MG/DL
APPEARANCE UR: ABNORMAL
BACTERIA #/AREA URNS HPF: ABNORMAL /HPF
BILIRUB UR QL STRIP: NEGATIVE
COLOR UR AUTO: YELLOW
GLUCOSE UR STRIP-MCNC: NEGATIVE MG/DL
HCG UR QL: NEGATIVE
HGB UR QL STRIP: ABNORMAL
KETONES UR STRIP-MCNC: NEGATIVE MG/DL
LEUKOCYTE ESTERASE UR QL STRIP: ABNORMAL
MUCOUS THREADS #/AREA URNS LPF: PRESENT /LPF
NITRATE UR QL: POSITIVE
PH UR STRIP: 7 [PH] (ref 4.7–8)
RBC URINE: 28 /HPF
SP GR UR STRIP: 1.02 (ref 1–1.03)
SQUAMOUS EPITHELIAL: 7 /HPF
TRANSITIONAL EPI: 1 /HPF
UROBILINOGEN UR STRIP-MCNC: NORMAL MG/DL
WBC CLUMPS #/AREA URNS HPF: PRESENT /HPF
WBC URINE: >182 /HPF

## 2023-05-02 PROCEDURE — 81001 URINALYSIS AUTO W/SCOPE: CPT | Performed by: STUDENT IN AN ORGANIZED HEALTH CARE EDUCATION/TRAINING PROGRAM

## 2023-05-02 PROCEDURE — 87186 SC STD MICRODIL/AGAR DIL: CPT | Performed by: STUDENT IN AN ORGANIZED HEALTH CARE EDUCATION/TRAINING PROGRAM

## 2023-05-02 PROCEDURE — 99283 EMERGENCY DEPT VISIT LOW MDM: CPT | Performed by: PHYSICIAN ASSISTANT

## 2023-05-02 PROCEDURE — 99284 EMERGENCY DEPT VISIT MOD MDM: CPT | Mod: 25

## 2023-05-02 PROCEDURE — 81025 URINE PREGNANCY TEST: CPT | Performed by: STUDENT IN AN ORGANIZED HEALTH CARE EDUCATION/TRAINING PROGRAM

## 2023-05-02 RX ORDER — FLUCONAZOLE 150 MG/1
TABLET ORAL
Qty: 2 TABLET | Refills: 0 | Status: SHIPPED | OUTPATIENT
Start: 2023-05-02 | End: 2023-05-05

## 2023-05-02 RX ORDER — CIPROFLOXACIN 500 MG/1
500 TABLET, FILM COATED ORAL 2 TIMES DAILY
Qty: 14 TABLET | Refills: 0 | Status: SHIPPED | OUTPATIENT
Start: 2023-05-02 | End: 2023-05-09

## 2023-05-02 ASSESSMENT — ENCOUNTER SYMPTOMS
CHILLS: 0
ABDOMINAL PAIN: 0
VOMITING: 0
CHEST TIGHTNESS: 0
FEVER: 0
DIARRHEA: 0
ACTIVITY CHANGE: 0
APPETITE CHANGE: 0
SHORTNESS OF BREATH: 0
FATIGUE: 0
NEUROLOGICAL NEGATIVE: 1
COUGH: 0

## 2023-05-02 NOTE — ED NOTES
Pt complains of continued urgency and left low back/flank pain that is intermittent. Pain is non-radiating. Pt recently completed 5 day course of Keflex and continues to notice cloudy, dark, odorous urine.

## 2023-05-02 NOTE — DISCHARGE INSTRUCTIONS
We have elected to forego further laboratory evaluation and any imaging at this time.  This is reasonable but please return to this emergency department for any worsening symptoms, fevers, vomiting, or other questions or concerns whatsoever.  Persistent urinary tract infections will require further evaluation as an outpatient.

## 2023-05-02 NOTE — ED TRIAGE NOTES
Patient presents with c/o urgency and left flank pain that started a week.5 ago. Was placed on keflex for 5 days without relief. Denies blood in urine.

## 2023-05-02 NOTE — ED PROVIDER NOTES
History     Chief Complaint   Patient presents with     Flank Pain     The history is provided by the patient.     Latasha Fisher is a 32 year old female who presented to the emergency department ambulatory along with infant son for evaluation of persistent lower urinary tract symptoms consisting of urgency, frequency, and intermittent mild left flank discomfort.  No pain at this time.  Nonradiating.  No fevers.  No vomiting.  Feels well.  Treated recently with 5 days of Keflex.  Culture positive for E. coli at that time.  She has undergone appendectomy in the past.    Allergies:  No Known Allergies    Problem List:    Patient Active Problem List    Diagnosis Date Noted     Moderate episode of recurrent major depressive disorder (H) 08/23/2022     Priority: Medium     ACP (advance care planning) 09/27/2017     Priority: Medium     Advance Care Planning 9/27/2017: ACP Review of Chart / Resources Provided:  Reviewed chart for advance care plan.  Latasha Fisher has no plan or code status on file. Discussed available resources and provided with information.   Added by Ania Benitez             Polycystic kidney disease 05/27/2015     Priority: Medium     Autosomal dominant polycystic kidney disease 10/03/2013     Priority: Medium     Notalgia 08/25/2006     Priority: Medium     Overview:   IMO Update 10/11       Pes planus 08/25/2006     Priority: Medium     Overview:   IMO Update 10 2016       Scoliosis (and kyphoscoliosis), idiopathic 07/26/2005     Priority: Medium     Overview:   Mild  Replacing diagnoses that were inactivated after the 10/1/2021 regulatory import.          Past Medical History:    Past Medical History:   Diagnosis Date     Acute pancreatitis child     Autosomal dominant polycystic kidney disease 10/03/2013     Hepatitis remote     Infection due to 2019 novel coronavirus 05/10/2022     Introital dyspareunia 07/18/2017     Moderate episode of recurrent major depressive disorder (H) 08/23/2022      Notalgia 2006     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 2008     Papanicolaou smear of cervix with low grade squamous intraepithelial lesion (LGSIL) 2015     Pes planus 2006     Polycystic kidney disease 2015     Retained complete placenta 2016     Scoliosis (and kyphoscoliosis), idiopathic 2005     Tobacco abuse        Past Surgical History:    Past Surgical History:   Procedure Laterality Date     ESOPHAGOSCOPY, GASTROSCOPY, DUODENOSCOPY (EGD), COMBINED N/A 6/10/2021    Procedure: upper endoscopy with biopsy;  Surgeon: Arturo Silva MD;  Location: [facility]     KNEE SURGERY       LAPAROSCOPIC APPENDECTOMY  2013    Procedure: LAPAROSCOPIC APPENDECTOMY;  LAPAROSCOPIC APPENDECTOMY;  Surgeon: Paula Roberts MD;  Location: [facility]       Family History:    Family History   Problem Relation Age of Onset     Thyroid Disease Mother      Diabetes Father      Asthma Sister      Attention Deficit Disorder Brother        Social History:  Marital Status:  Single [1]  Social History     Tobacco Use     Smoking status: Former     Packs/day: 0.50     Years: 14.00     Pack years: 7.00     Types: Cigarettes     Start date: 2006     Quit date: 2020     Years since quittin.3     Smokeless tobacco: Never   Substance Use Topics     Alcohol use: No     Alcohol/week: 5.0 standard drinks of alcohol     Types: 6 Cans of beer per week     Comment: occasionally when she goes out' past hx heavier consumption     Drug use: No        Medications:    ciprofloxacin (CIPRO) 500 MG tablet  fluconazole (DIFLUCAN) 150 MG tablet  sertraline (ZOLOFT) 50 MG tablet  acetaminophen (TYLENOL) 325 MG tablet  cholecalciferol (VITAMIN D3) 1250 mcg (93010 units) capsule  ibuprofen (ADVIL/MOTRIN) 800 MG tablet  norgestimate-ethinyl estradiol (ORTHO-CYCLEN) 0.25-35 MG-MCG tablet          Review of Systems   Constitutional: Negative for activity change, appetite change, chills, fatigue and  fever.   Respiratory: Negative for cough, chest tightness and shortness of breath.    Cardiovascular: Negative for chest pain.   Gastrointestinal: Negative for abdominal pain, diarrhea and vomiting.   Genitourinary:        See HPI   Skin: Negative.    Allergic/Immunologic: Negative for immunocompromised state.   Neurological: Negative.        Physical Exam   BP: 142/83  Pulse: 54  Temp: 97.3  F (36.3  C)  Resp: 16  SpO2: 99 %      Physical Exam  Vitals and nursing note reviewed.   Constitutional:       General: She is not in acute distress.     Appearance: Normal appearance. She is normal weight. She is not ill-appearing, toxic-appearing or diaphoretic.      Comments: This is a pleasant and talkative well-appearing 32-year-old female found seated upright on the edge of the exam bed in no distress.   Cardiovascular:      Rate and Rhythm: Regular rhythm.      Comments: Mild bradycardia  Abdominal:      General: There is no distension.      Palpations: Abdomen is soft.      Tenderness: There is no abdominal tenderness.      Comments: No CVA tenderness.   Skin:     General: Skin is warm and dry.      Capillary Refill: Capillary refill takes less than 2 seconds.   Neurological:      General: No focal deficit present.      Mental Status: She is alert and oriented to person, place, and time.   Psychiatric:         Mood and Affect: Mood normal.         Behavior: Behavior normal.         ED Course                 Procedures              Critical Care time:  none               Results for orders placed or performed during the hospital encounter of 05/02/23 (from the past 24 hour(s))   UA with Microscopic reflex to Culture    Specimen: Urine, Midstream   Result Value Ref Range    Color Urine Yellow Colorless, Straw, Light Yellow, Yellow    Appearance Urine Slightly Cloudy (A) Clear    Glucose Urine Negative Negative mg/dL    Bilirubin Urine Negative Negative    Ketones Urine Negative Negative mg/dL    Specific Gravity Urine  1.016 1.003 - 1.035    Blood Urine Moderate (A) Negative    pH Urine 7.0 4.7 - 8.0    Protein Albumin Urine 20 (A) Negative mg/dL    Urobilinogen Urine Normal Normal, 2.0 mg/dL    Nitrite Urine Positive (A) Negative    Leukocyte Esterase Urine Large (A) Negative    Bacteria Urine Many (A) None Seen /HPF    WBC Clumps Urine Present (A) None Seen /HPF    Mucus Urine Present (A) None Seen /LPF    RBC Urine 28 (H) <=2 /HPF    WBC Urine >182 (H) <=5 /HPF    Squamous Epithelials Urine 7 (H) <=1 /HPF    Transitional Epithelials Urine 1 <=1 /HPF    Narrative    Urine Culture ordered based on laboratory criteria   HCG qualitative urine   Result Value Ref Range    hCG Urine Qualitative Negative Negative       Medications - No data to display    Assessments & Plan (with Medical Decision Making)   Persistent urinary tract infection with a previous culture positive E. coli.  Treated with Keflex twice daily for 5 days.  Culture sensitive to cephalexin.  However, symptoms have persisted.  Intermittent mild left flank discomfort but no pain at this time.  No fevers.  No tachycardia.  Well-appearing.  No history immunosuppression.  Exam is benign.  Discussed treatment with medication changes including imaging to rule out obstructive process.  Patient voiced complete understanding however declined imaging.  She would like to go ahead with oral antibiotics and discharge.  Reports feeling well.  This is reasonable but I had a long detailed discussion with the patient regarding strict return precautions.  This would include fevers, persistent flank pain, weakness, fatigue, vomiting, or other questions or concerns whatsoever.  The patient voiced complete understanding was happy and agreeable.    This document was prepared using a combination of typing and voice generated software.  While every attempt was made for accuracy, spelling and grammatical errors may exist.    I have reviewed the nursing notes.    I have reviewed the findings,  diagnosis, plan and need for follow up with the patient.           Medical Decision Making  The patient's presentation was of low complexity (an acute and uncomplicated illness or injury).    The patient's evaluation involved:  ordering and/or review of 1 test(s) in this encounter (Urinalysis)  review of 1 test result(s) ordered prior to this encounter (Previous urinalysis and culture)  strong consideration of a test (Noncontrast CT scan of the abdomen and pelvis) that was ultimately deferred    The patient's management necessitated moderate risk (prescription drug management including medications given in the ED).        New Prescriptions    CIPROFLOXACIN (CIPRO) 500 MG TABLET    Take 1 tablet (500 mg) by mouth 2 times daily for 7 days    FLUCONAZOLE (DIFLUCAN) 150 MG TABLET    Take one tablet now, and one tablet in three days       Final diagnoses:   UTI (urinary tract infection)       5/2/2023   [facility]     John Addison PA-C  05/02/23 [id number]

## 2023-05-04 LAB — BACTERIA UR CULT: ABNORMAL

## 2023-06-03 ENCOUNTER — HEALTH MAINTENANCE LETTER (OUTPATIENT)
Age: 33
End: 2023-06-03

## 2023-09-11 NOTE — ED NOTES
Discharge instructions reviewed with the patient. Pt advised to come back for further evaluation with worsening sx, fevers, vomiting. Pt to follow-up with PCP for on-going sx. Rx sent to [facility]. Pt verbalized understanding. Pt declines discharge vitals.   generalized

## 2023-10-17 ENCOUNTER — OFFICE VISIT (OUTPATIENT)
Dept: OBGYN | Facility: OTHER | Age: 33
End: 2023-10-17
Attending: NURSE PRACTITIONER

## 2023-10-17 VITALS
SYSTOLIC BLOOD PRESSURE: 100 MMHG | HEIGHT: 65 IN | OXYGEN SATURATION: 96 % | WEIGHT: 164 LBS | HEART RATE: 71 BPM | DIASTOLIC BLOOD PRESSURE: 60 MMHG | BODY MASS INDEX: 27.32 KG/M2

## 2023-10-17 DIAGNOSIS — Z12.4 CERVICAL CANCER SCREENING: ICD-10-CM

## 2023-10-17 DIAGNOSIS — F32.81 PMDD (PREMENSTRUAL DYSPHORIC DISORDER): ICD-10-CM

## 2023-10-17 DIAGNOSIS — Z01.419 WELL WOMAN EXAM WITH ROUTINE GYNECOLOGICAL EXAM: ICD-10-CM

## 2023-10-17 PROCEDURE — 87624 HPV HI-RISK TYP POOLED RSLT: CPT | Performed by: NURSE PRACTITIONER

## 2023-10-17 PROCEDURE — 99395 PREV VISIT EST AGE 18-39: CPT | Performed by: NURSE PRACTITIONER

## 2023-10-17 PROCEDURE — 88142 CYTOPATH C/V THIN LAYER: CPT | Performed by: NURSE PRACTITIONER

## 2023-10-17 ASSESSMENT — PAIN SCALES - GENERAL: PAINLEVEL: NO PAIN (0)

## 2023-10-18 RX ORDER — NORGESTIMATE AND ETHINYL ESTRADIOL 0.25-0.035
1 KIT ORAL DAILY
Qty: 90 TABLET | Refills: 4 | Status: SHIPPED | OUTPATIENT
Start: 2023-10-18

## 2023-10-18 ASSESSMENT — ANXIETY QUESTIONNAIRES
2. NOT BEING ABLE TO STOP OR CONTROL WORRYING: MORE THAN HALF THE DAYS
6. BECOMING EASILY ANNOYED OR IRRITABLE: MORE THAN HALF THE DAYS
3. WORRYING TOO MUCH ABOUT DIFFERENT THINGS: MORE THAN HALF THE DAYS
GAD7 TOTAL SCORE: 14
1. FEELING NERVOUS, ANXIOUS, OR ON EDGE: MORE THAN HALF THE DAYS
GAD7 TOTAL SCORE: 14
5. BEING SO RESTLESS THAT IT IS HARD TO SIT STILL: MORE THAN HALF THE DAYS
7. FEELING AFRAID AS IF SOMETHING AWFUL MIGHT HAPPEN: MORE THAN HALF THE DAYS

## 2023-10-18 ASSESSMENT — PATIENT HEALTH QUESTIONNAIRE - PHQ9
SUM OF ALL RESPONSES TO PHQ QUESTIONS 1-9: 14
5. POOR APPETITE OR OVEREATING: MORE THAN HALF THE DAYS

## 2023-10-18 NOTE — PROGRESS NOTES
CC:  Annual exam  HPI:  Latasha Fisher is a 33 year old female P3, NVD's.  Single mom.  Not sexually active currently.  Periods are regular and heavy for 1-3 days with very small clots.  Cramping and bleeding for 5 days.  No LMP recorded.  She has weaned off of sertraline due to lack of insurance coverage but has determined that she needs to go back on it.  PHQ9 score of 14 and GAD7 score of 14 today.  She denies thoughts of self harm or death.  Sx are the worst between her periods and she feels mentally more stable during her menses.  Would like to go back on 25 mg daily of sertraline, as this was working well for her.  Also interested in an oral bcp to manage heavy periods.   No other c/o.      Past GYN history:  No STD history and HPV  STI testing offered?  Declined       Last PAP smear:  Normal  Mammograms up to date: not applicable    Past Medical History:   Diagnosis Date    Acute pancreatitis child    post treuma, from a fall    Autosomal dominant polycystic kidney disease 10/03/2013    Hepatitis remote    transient, probably from alcohol    Infection due to 2019 novel coronavirus 05/10/2022    Introital dyspareunia 07/18/2017    Moderate episode of recurrent major depressive disorder (H) 08/23/2022    Notalgia 08/25/2006    Overview:  IMO Update 10/11    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 06/04/2008    Papanicolaou smear of cervix with low grade squamous intraepithelial lesion (LGSIL) 08/25/2015    5/15.  [facility].   LGSIL, + HR HPV. F/u pap pp.     Pes planus 08/25/2006    Overview:  IMO Update 10 2016    Polycystic kidney disease 05/27/2015    Retained complete placenta 01/04/2016    Removed manually and currettaged. No excess blood loss noted     Scoliosis (and kyphoscoliosis), idiopathic 07/26/2005    Overview:  Mild  Replacing diagnoses that were inactivated after the 10/1/2021 regulatory import.    Tobacco abuse        Past Surgical History:   Procedure Laterality Date    ESOPHAGOSCOPY,  "GASTROSCOPY, DUODENOSCOPY (EGD), COMBINED N/A 6/10/2021    Procedure: upper endoscopy with biopsy;  Surgeon: Arturo Silva MD;  Location: [facility]    KNEE SURGERY      LAPAROSCOPIC APPENDECTOMY  4/23/2013    Procedure: LAPAROSCOPIC APPENDECTOMY;  LAPAROSCOPIC APPENDECTOMY;  Surgeon: Paula Roberts MD;  Location: [facility]       Family History   Problem Relation Age of Onset    Thyroid Disease Mother     Diabetes Father     Asthma Sister     Attention Deficit Disorder Brother        Current Outpatient Medications   Medication Sig Dispense Refill    acetaminophen (TYLENOL) 325 MG tablet Take 2 tablets (650 mg) by mouth every 4 hours as needed for mild pain, fever or headaches 100 tablet 0    ibuprofen (ADVIL/MOTRIN) 800 MG tablet Take 1 tablet (800 mg) by mouth every 6 hours as needed for other, moderate pain or fever (cramping) 100 tablet 0    sertraline (ZOLOFT) 50 MG tablet Take 0.5 tablets (25 mg) by mouth daily 60 tablet 3    cholecalciferol (VITAMIN D3) 1250 mcg (56036 units) capsule Take 1 capsule by mouth once a week (Patient not taking: Reported on 10/17/2023) 4 capsule 0       Allergies: Patient has no known allergies.    ROS:  CONSTITUTIONAL:NEGATIVE for fever, chills, change in weight  RESP:NEGATIVE for significant cough or SOB  BREAST: NEGATIVE for masses, tenderness or discharge  CV: NEGATIVE for chest pain, palpitations or peripheral edema  GI: NEGATIVE for nausea, abdominal pain, heartburn, or change in bowel habits  : negative for, dysuria, incontinence, and vaginal discharge  ENDOCRINE: NEGATIVE for temperature intolerance, skin/hair changes  PSYCHIATRIC: NEGATIVE forsuicidal thoughts with specific plan and thoughts of self harm    EXAM:  Blood pressure 100/60, pulse 71, height 1.651 m (5' 5\"), weight 74.4 kg (164 lb), SpO2 96%, not currently breastfeeding.   BMI= Body mass index is 27.29 kg/m .  General - pleasant female in no acute distress.  Neck - supple without lymphadenopathy or " thyromegaly.  Lungs - clear to auscultation bilaterally.  Heart - regular rate and rhythm without murmur.  Breast - no nodularity, asymmetry or nipple discharge bilaterally.  Abdomen - soft, nontender, nondistended, no hepatosplenomegaly.  Pelvic - EG: normal adult female, BUS: within normal limits, Vagina: well rugated, no discharge, Cervix: no lesions or CMT, Uterus: firm, normal sized and nontender, Adnexae: no masses or tenderness.  Rectovaginal - deferred.  Musculoskeletal - no gross deformities.  Neurological - normal strength, sensation, and mental status.      ASSESSMENT/PLAN:  (Z01.419) Well woman exam with routine gynecological exam  Comment:   Plan: return annually and as needed    (F32.81) PMDD (premenstrual dysphoric disorder)  Comment:   Plan: sertraline (ZOLOFT) 50 MG tablet        Bcp to be sent to pharmacy.  Follow up in 3 months.     (Z12.4) Cervical cancer screening  Comment:   Plan: A pap thin layer screen with  HPV - recommended        age 30 - 65 years              Discussed exercise and healthy eating, including calcium intake.  She should return to the clinic in one year, or sooner if problems arise.

## 2023-10-24 LAB
HUMAN PAPILLOMA VIRUS 16 DNA: NEGATIVE
HUMAN PAPILLOMA VIRUS 18 DNA: NEGATIVE
HUMAN PAPILLOMA VIRUS FINAL DIAGNOSIS: NORMAL
HUMAN PAPILLOMA VIRUS OTHER HR: NEGATIVE

## 2024-11-23 ENCOUNTER — HEALTH MAINTENANCE LETTER (OUTPATIENT)
Age: 34
End: 2024-11-23

## 2024-12-27 ENCOUNTER — HOSPITAL ENCOUNTER (EMERGENCY)
Facility: HOSPITAL | Age: 34
Discharge: HOME | End: 2024-12-27
Attending: PHYSICIAN ASSISTANT | Admitting: PHYSICIAN ASSISTANT
Payer: COMMERCIAL

## 2024-12-27 VITALS
SYSTOLIC BLOOD PRESSURE: 149 MMHG | OXYGEN SATURATION: 99 % | RESPIRATION RATE: 18 BRPM | DIASTOLIC BLOOD PRESSURE: 101 MMHG | HEART RATE: 85 BPM | TEMPERATURE: 98 F

## 2024-12-27 DIAGNOSIS — R30.0 DYSURIA: ICD-10-CM

## 2024-12-27 LAB
ALBUMIN UR-MCNC: NEGATIVE MG/DL
APPEARANCE UR: CLEAR
BACTERIAL VAGINOSIS VAG-IMP: NEGATIVE
BILIRUB UR QL STRIP: NEGATIVE
C TRACH DNA SPEC QL PROBE+SIG AMP: NEGATIVE
CANDIDA DNA VAG QL NAA+PROBE: NOT DETECTED
CANDIDA GLABRATA / CANDIDA KRUSEI DNA: NOT DETECTED
COLOR UR AUTO: ABNORMAL
GLUCOSE UR STRIP-MCNC: NEGATIVE MG/DL
HGB UR QL STRIP: ABNORMAL
KETONES UR STRIP-MCNC: NEGATIVE MG/DL
LEUKOCYTE ESTERASE UR QL STRIP: ABNORMAL
MUCOUS THREADS #/AREA URNS LPF: PRESENT /LPF
N GONORRHOEA DNA SPEC QL NAA+PROBE: NEGATIVE
NITRATE UR QL: NEGATIVE
PH UR STRIP: 6 [PH] (ref 4.7–8)
RBC URINE: 143 /HPF
SP GR UR STRIP: 1.02 (ref 1–1.03)
SQUAMOUS EPITHELIAL: 1 /HPF
T VAGINALIS DNA VAG QL NAA+PROBE: NOT DETECTED
UROBILINOGEN UR STRIP-MCNC: NORMAL MG/DL
WBC URINE: 9 /HPF

## 2024-12-27 PROCEDURE — 0352U MULTIPLEX VAGINAL PANEL BY PCR: CPT | Performed by: PHYSICIAN ASSISTANT

## 2024-12-27 PROCEDURE — 81001 URINALYSIS AUTO W/SCOPE: CPT | Performed by: PHYSICIAN ASSISTANT

## 2024-12-27 PROCEDURE — 99213 OFFICE O/P EST LOW 20 MIN: CPT | Performed by: PHYSICIAN ASSISTANT

## 2024-12-27 PROCEDURE — 87086 URINE CULTURE/COLONY COUNT: CPT | Performed by: PHYSICIAN ASSISTANT

## 2024-12-27 PROCEDURE — G0463 HOSPITAL OUTPT CLINIC VISIT: HCPCS

## 2024-12-27 PROCEDURE — 87591 N.GONORRHOEAE DNA AMP PROB: CPT | Performed by: PHYSICIAN ASSISTANT

## 2024-12-27 PROCEDURE — 87491 CHLMYD TRACH DNA AMP PROBE: CPT | Performed by: PHYSICIAN ASSISTANT

## 2024-12-27 RX ORDER — CEPHALEXIN 500 MG/1
500 CAPSULE ORAL 3 TIMES DAILY
Qty: 21 CAPSULE | Refills: 0 | Status: SHIPPED | OUTPATIENT
Start: 2024-12-27 | End: 2025-01-03

## 2024-12-27 RX ORDER — PHENAZOPYRIDINE HYDROCHLORIDE 200 MG/1
200 TABLET, FILM COATED ORAL 3 TIMES DAILY
Qty: 9 TABLET | Refills: 0 | Status: SHIPPED | OUTPATIENT
Start: 2024-12-27 | End: 2024-12-30

## 2024-12-27 ASSESSMENT — ENCOUNTER SYMPTOMS: DYSURIA: 1

## 2024-12-27 ASSESSMENT — ACTIVITIES OF DAILY LIVING (ADL)
ADLS_ACUITY_SCORE: 48

## 2024-12-27 NOTE — ED PROVIDER NOTES
History     Chief Complaint   Patient presents with    Vaginal Problem     HPI  Latasha Fisher is a 34 year old female who presents to urgent care for evaluation of urinary symptoms.  Patient states that since this past Saturday she started having small bumps inside of her labia along with itching and pain.  She states that intercourse is painful as well.  Patient also states that she has some dysuria.  Patient is currently on menstrual cycle.  Patient is currently sexually active with 1 partner.  He states that partner is currently asymptomatic to her knowledge.  She denies any fevers, abdominal pain, or any other associated symptoms.    Allergies:  No Known Allergies    Problem List:    Patient Active Problem List    Diagnosis Date Noted    Moderate episode of recurrent major depressive disorder (H) 08/23/2022     Priority: Medium    ACP (advance care planning) 09/27/2017     Priority: Medium     Advance Care Planning 9/27/2017: ACP Review of Chart / Resources Provided:  Reviewed chart for advance care plan.  Latasha Fisher has no plan or code status on file. Discussed available resources and provided with information.   Added by Ania Benitez            Polycystic kidney disease 05/27/2015     Priority: Medium    Autosomal dominant polycystic kidney disease 10/03/2013     Priority: Medium    Notalgia 08/25/2006     Priority: Medium     Overview:   IMO Update 10/11      Pes planus 08/25/2006     Priority: Medium     Overview:   IMO Update 10 2016      Scoliosis (and kyphoscoliosis), idiopathic 07/26/2005     Priority: Medium     Overview:   Mild  Replacing diagnoses that were inactivated after the 10/1/2021 regulatory import.          Past Medical History:    Past Medical History:   Diagnosis Date    Acute pancreatitis child    Autosomal dominant polycystic kidney disease 10/03/2013    Hepatitis remote    Infection due to 2019 novel coronavirus 05/10/2022    Introital dyspareunia 07/18/2017    Moderate  episode of recurrent major depressive disorder (H) 2022    Notalgia 2006    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 2008    Papanicolaou smear of cervix with low grade squamous intraepithelial lesion (LGSIL) 2015    Pes planus 2006    Polycystic kidney disease 2015    Retained complete placenta 2016    Scoliosis (and kyphoscoliosis), idiopathic 2005    Tobacco abuse        Past Surgical History:    Past Surgical History:   Procedure Laterality Date    ESOPHAGOSCOPY, GASTROSCOPY, DUODENOSCOPY (EGD), COMBINED N/A 6/10/2021    Procedure: upper endoscopy with biopsy;  Surgeon: Arturo Silva MD;  Location: [facility]    KNEE SURGERY      LAPAROSCOPIC APPENDECTOMY  2013    Procedure: LAPAROSCOPIC APPENDECTOMY;  LAPAROSCOPIC APPENDECTOMY;  Surgeon: Paula Roberts MD;  Location: [facility]       Family History:    Family History   Problem Relation Age of Onset    Thyroid Disease Mother     Diabetes Father     Asthma Sister     Attention Deficit Disorder Brother        Social History:  Marital Status:  Single [1]  Social History     Tobacco Use    Smoking status: Former     Current packs/day: 0.00     Average packs/day: 0.5 packs/day for 15.0 years (7.5 ttl pk-yrs)     Types: Cigarettes     Start date: 2006     Quit date: 2020     Years since quittin.0    Smokeless tobacco: Never   Substance Use Topics    Alcohol use: No     Alcohol/week: 5.0 standard drinks of alcohol     Types: 6 Cans of beer per week     Comment: occasionally when she goes out' past hx heavier consumption    Drug use: No        Medications:    cephALEXin (KEFLEX) 500 MG capsule  phenazopyridine (PYRIDIUM) 200 MG tablet  acetaminophen (TYLENOL) 325 MG tablet  cholecalciferol (VITAMIN D3) 1250 mcg (81487 units) capsule  ibuprofen (ADVIL/MOTRIN) 800 MG tablet  norgestimate-ethinyl estradiol (ORTHO-CYCLEN) 0.25-35 MG-MCG tablet  sertraline (ZOLOFT) 50 MG tablet          Review of Systems    Genitourinary:  Positive for dyspareunia, dysuria, vaginal bleeding and vaginal pain. Negative for pelvic pain and vaginal discharge.   All other systems reviewed and are negative.      Physical Exam   BP: (!) 149/101  Pulse: 85  Temp: 98  F (36.7  C)  Resp: 18  SpO2: 99 %      Physical Exam  Vitals and nursing note reviewed.   Constitutional:       General: She is not in acute distress.     Appearance: Normal appearance. She is not ill-appearing or toxic-appearing.   Cardiovascular:      Rate and Rhythm: Regular rhythm.      Heart sounds: Normal heart sounds.   Pulmonary:      Breath sounds: Normal breath sounds.   Genitourinary:     Comments: Patient declines  physical exam today.  Neurological:      Mental Status: She is alert.         ED Course        Procedures             Critical Care time:               Results for orders placed or performed during the hospital encounter of 12/27/24 (from the past 24 hours)   Multiplex Vaginal Panel by PCR    Specimen: Vagina; Swab   Result Value Ref Range    Bacterial Vaginosis Organism DNA Negative Negative    Candida Group DNA Not Detected Not Detected    Candida glabrata / Madhavi krusei DNA Not Detected Not Detected    Trichomonas vaginalis DNA Not Detected Not Detected    Narrative    The Xpert  Xpress MVP test, performed on the [employer], is an automated, qualitative in vitro diagnostic test for the detection of DNA targets from anaerobic bacteria associated with bacterial vaginosis, Candida species associated with vulvovaginal candidiasis, and Trichomonas vaginalis. The assay uses clinician-collected and self-collected vaginal swabs from patients who are symptomatic for vaginitis/ vaginosis. The Xpert  Xpress MVP test utilizes real-time polymerase chain reaction (PCR) for the amplification of specific DNA targets and utilizes fluorogenic target-specific hybridization probes to detect and differentiate DNA. It is intended to aid in the  diagnosis of vaginal infections in women with a clinical presentation consistent with bacterial vaginosis, vulvovaginal candidiasis, or trichomoniasis.   The assay targets three anaerobic microorgansims that are associated with bacterial vaginosis (BV). Other organisms that are not detected by the Xpert  Xpress MVP test have also been reported to be associated with BV. The BV organism and Candida species targets of the Xpert  Xpress MVP test can be commensal in women; positive results must be considered in conjunction with other clinical and patient information to determine the disease status.   UA with Microscopic reflex to Culture    Specimen: Urine, Clean Catch   Result Value Ref Range    Color Urine Light Yellow Colorless, Straw, Light Yellow, Yellow    Appearance Urine Clear Clear    Glucose Urine Negative Negative mg/dL    Bilirubin Urine Negative Negative    Ketones Urine Negative Negative mg/dL    Specific Gravity Urine 1.018 1.003 - 1.035    Blood Urine Large (A) Negative    pH Urine 6.0 4.7 - 8.0    Protein Albumin Urine Negative Negative mg/dL    Urobilinogen Urine Normal Normal, 2.0 mg/dL    Nitrite Urine Negative Negative    Leukocyte Esterase Urine Moderate (A) Negative    Mucus Urine Present (A) None Seen /LPF    RBC Urine 143 (H) <=2 /HPF    WBC Urine 9 (H) <=5 /HPF    Squamous Epithelials Urine 1 <=1 /HPF    Narrative    Urine Culture ordered based on laboratory criteria   Chlamydia trachomatis/Neisseria gonorrhoeae by PCR    Specimen: Urine, Voided   Result Value Ref Range    Chlamydia Trachomatis Negative Negative    Neisseria gonorrhoeae Negative Negative    Narrative    Assay performed using [employer] real-time, reverse-transcriptase PCR.       Medications - No data to display    Assessments & Plan (with Medical Decision Making)   #1.  Dysuria    Discussed exam findings with patient as well as lab results.  Patient's UA is somewhat suggestive of cystitis, however urine culture is pending and will  be definitive answer.  Patient would like to start an antibiotic in the meantime and Keflex along with Pyridium is sent in for patient.  Patient's vaginal multiplex was normal as well as gonorrhea chlamydia test.  I would like patient to follow-up with OB/GYN for further evaluation.  Patient verbalized understanding and agreement of plan.    I have reviewed the nursing notes.    I have reviewed the findings, diagnosis, plan and need for follow up with the patient.                New Prescriptions    CEPHALEXIN (KEFLEX) 500 MG CAPSULE    Take 1 capsule (500 mg) by mouth 3 times daily for 7 days.    PHENAZOPYRIDINE (PYRIDIUM) 200 MG TABLET    Take 1 tablet (200 mg) by mouth 3 times daily for 3 days.       Final diagnoses:   Dysuria       12/27/2024   [facility]       Michael Rosales PA-C  12/27/24 [id number]

## 2024-12-27 NOTE — ED TRIAGE NOTES
Pt presents with c/o having increased burning while voiding and irritation and reports increased bumps at the vagina   Reports having also increased pain with intercourse   Pt states has been on going since Saturday   Reports self treated at home with diflucan and monos tat for suspected yeast infection with no relief

## 2024-12-28 ENCOUNTER — HOSPITAL ENCOUNTER (EMERGENCY)
Facility: HOSPITAL | Age: 34
Discharge: HOME | End: 2024-12-28
Attending: PHYSICIAN ASSISTANT
Payer: COMMERCIAL

## 2024-12-28 VITALS
HEART RATE: 78 BPM | SYSTOLIC BLOOD PRESSURE: 122 MMHG | OXYGEN SATURATION: 97 % | RESPIRATION RATE: 18 BRPM | TEMPERATURE: 97.4 F | DIASTOLIC BLOOD PRESSURE: 85 MMHG

## 2024-12-28 DIAGNOSIS — Z11.3 SCREEN FOR STD (SEXUALLY TRANSMITTED DISEASE): ICD-10-CM

## 2024-12-28 LAB
HIV 1+2 AB+HIV1 P24 AG SERPL QL IA: NONREACTIVE
HOLD SPECIMEN: NORMAL

## 2024-12-28 PROCEDURE — 87389 HIV-1 AG W/HIV-1&-2 AB AG IA: CPT | Performed by: PHYSICIAN ASSISTANT

## 2024-12-28 PROCEDURE — 87529 HSV DNA AMP PROBE: CPT | Performed by: PHYSICIAN ASSISTANT

## 2024-12-28 PROCEDURE — 99213 OFFICE O/P EST LOW 20 MIN: CPT | Performed by: PHYSICIAN ASSISTANT

## 2024-12-28 PROCEDURE — 86780 TREPONEMA PALLIDUM: CPT | Performed by: PHYSICIAN ASSISTANT

## 2024-12-28 PROCEDURE — 36415 COLL VENOUS BLD VENIPUNCTURE: CPT | Performed by: PHYSICIAN ASSISTANT

## 2024-12-28 PROCEDURE — G0463 HOSPITAL OUTPT CLINIC VISIT: HCPCS

## 2024-12-28 RX ORDER — VALACYCLOVIR HYDROCHLORIDE 1 G/1
1000 TABLET, FILM COATED ORAL 2 TIMES DAILY
Qty: 20 TABLET | Refills: 0 | Status: SHIPPED | OUTPATIENT
Start: 2024-12-28 | End: 2025-01-07

## 2024-12-28 ASSESSMENT — ACTIVITIES OF DAILY LIVING (ADL): ADLS_ACUITY_SCORE: 48

## 2024-12-28 ASSESSMENT — COLUMBIA-SUICIDE SEVERITY RATING SCALE - C-SSRS
1. IN THE PAST MONTH, HAVE YOU WISHED YOU WERE DEAD OR WISHED YOU COULD GO TO SLEEP AND NOT WAKE UP?: NO
6. HAVE YOU EVER DONE ANYTHING, STARTED TO DO ANYTHING, OR PREPARED TO DO ANYTHING TO END YOUR LIFE?: NO
2. HAVE YOU ACTUALLY HAD ANY THOUGHTS OF KILLING YOURSELF IN THE PAST MONTH?: NO

## 2024-12-28 NOTE — ED TRIAGE NOTES
C/o std check     States that one of her, left lower side, sores opened up last night, states pain has increased. And her menstrual also just started   Looking to  get the std panel checked

## 2024-12-28 NOTE — ED PROVIDER NOTES
History     Chief Complaint   Patient presents with    Wound Check    Exposure to STD     HPI  Latasha Fisher is a 34 year old female who presents for lesion on her labia.  Patient was seen yesterday for physical exam at that time however when she was at work she had one of the lesions opened up.  She notes some discomfort to the area.  Patient denies any fevers or chills she does note that she had intercourse a day before the lesion started with a new partner.  He did not have any lesions that he was aware of and no history of herpes that he is aware of.  She notes that she had pain with intercourse she denies any other symptoms at this time    Allergies:  No Known Allergies    Problem List:    Patient Active Problem List    Diagnosis Date Noted    Moderate episode of recurrent major depressive disorder (H) 08/23/2022     Priority: Medium    ACP (advance care planning) 09/27/2017     Priority: Medium     Advance Care Planning 9/27/2017: ACP Review of Chart / Resources Provided:  Reviewed chart for advance care plan.  Latasha Fisher has no plan or code status on file. Discussed available resources and provided with information.   Added by Ania Benitez            Polycystic kidney disease 05/27/2015     Priority: Medium    Autosomal dominant polycystic kidney disease 10/03/2013     Priority: Medium    Notalgia 08/25/2006     Priority: Medium     Overview:   IMO Update 10/11      Pes planus 08/25/2006     Priority: Medium     Overview:   IMO Update 10 2016      Scoliosis (and kyphoscoliosis), idiopathic 07/26/2005     Priority: Medium     Overview:   Mild  Replacing diagnoses that were inactivated after the 10/1/2021 regulatory import.          Past Medical History:    Past Medical History:   Diagnosis Date    Acute pancreatitis child    Autosomal dominant polycystic kidney disease 10/03/2013    Hepatitis remote    Infection due to 2019 novel coronavirus 05/10/2022    Introital dyspareunia 07/18/2017     Moderate episode of recurrent major depressive disorder (H) 2022    Notalgia 2006    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 2008    Papanicolaou smear of cervix with low grade squamous intraepithelial lesion (LGSIL) 2015    Pes planus 2006    Polycystic kidney disease 2015    Retained complete placenta 2016    Scoliosis (and kyphoscoliosis), idiopathic 2005    Tobacco abuse        Past Surgical History:    Past Surgical History:   Procedure Laterality Date    ESOPHAGOSCOPY, GASTROSCOPY, DUODENOSCOPY (EGD), COMBINED N/A 6/10/2021    Procedure: upper endoscopy with biopsy;  Surgeon: Arturo Silva MD;  Location: [facility]    KNEE SURGERY      LAPAROSCOPIC APPENDECTOMY  2013    Procedure: LAPAROSCOPIC APPENDECTOMY;  LAPAROSCOPIC APPENDECTOMY;  Surgeon: Paula Roberts MD;  Location: [facility]       Family History:    Family History   Problem Relation Age of Onset    Thyroid Disease Mother     Diabetes Father     Asthma Sister     Attention Deficit Disorder Brother        Social History:  Marital Status:  Single [1]  Social History     Tobacco Use    Smoking status: Former     Current packs/day: 0.00     Average packs/day: 0.5 packs/day for 15.0 years (7.5 ttl pk-yrs)     Types: Cigarettes     Start date: 2006     Quit date: 2020     Years since quittin.0    Smokeless tobacco: Never   Substance Use Topics    Alcohol use: No     Alcohol/week: 5.0 standard drinks of alcohol     Types: 6 Cans of beer per week     Comment: occasionally when she goes out' past hx heavier consumption    Drug use: No        Medications:    valACYclovir (VALTREX) 1000 mg tablet  acetaminophen (TYLENOL) 325 MG tablet  cephALEXin (KEFLEX) 500 MG capsule  cholecalciferol (VITAMIN D3) 1250 mcg (21714 units) capsule  ibuprofen (ADVIL/MOTRIN) 800 MG tablet  norgestimate-ethinyl estradiol (ORTHO-CYCLEN) 0.25-35 MG-MCG tablet  phenazopyridine (PYRIDIUM) 200 MG tablet  sertraline  (ZOLOFT) 50 MG tablet          Review of Systems   All other systems reviewed and are negative.      Physical Exam   BP: 122/85  Pulse: 78  Temp: 97.4  F (36.3  C)  Resp: 18  SpO2: 97 %      Physical Exam  Exam conducted with a chaperone present.   Constitutional:       General: She is not in acute distress.     Appearance: Normal appearance. She is normal weight. She is not ill-appearing, toxic-appearing or diaphoretic.   HENT:      Head: Normocephalic and atraumatic.      Right Ear: External ear normal.      Left Ear: External ear normal.   Eyes:      Extraocular Movements: Extraocular movements intact.      Conjunctiva/sclera: Conjunctivae normal.      Pupils: Pupils are equal, round, and reactive to light.   Cardiovascular:      Rate and Rhythm: Normal rate.   Pulmonary:      Effort: Pulmonary effort is normal. No respiratory distress.   Genitourinary:     Labia:         Right: Lesion present.         Left: Lesion present.    Musculoskeletal:         General: Normal range of motion.   Skin:     General: Skin is warm and dry.      Coloration: Skin is not jaundiced or pale.   Neurological:      Mental Status: She is alert and oriented to person, place, and time. Mental status is at baseline.      Cranial Nerves: No cranial nerve deficit.   Psychiatric:         Mood and Affect: Mood normal.         ED Course   We will do HSV testing and I will start her on Valtrex.  Patient also requested HIV testing which is done.     Procedures                  Results for orders placed or performed during the hospital encounter of 12/28/24 (from the past 24 hours)   Extra Tube    Narrative    The following orders were created for panel order Extra Tube.  Procedure                               Abnormality         Status                     ---------                               -----------         ------                     Extra Purple Top Tube[[id number]]                            In process                   Please view  results for these tests on the individual orders.       Medications - No data to display    Assessments & Plan (with Medical Decision Making)     I have reviewed the nursing notes.    I have reviewed the findings, diagnosis, plan and need for follow up with the patient.          Discharge Medication List as of 12/28/2024 10:32 AM        START taking these medications    Details   valACYclovir (VALTREX) 1000 mg tablet Take 1 tablet (1,000 mg) by mouth 2 times daily for 10 days., Disp-20 tablet, R-0, E-Prescribe             Final diagnoses:   Screen for STD (sexually transmitted disease)       12/28/2024   [facility]       Sharath Crouch PA-C  12/28/24 [id number]

## 2024-12-29 LAB
BACTERIA UR CULT: NORMAL
HSV1 DNA SPEC QL NAA+PROBE: DETECTED
HSV2 DNA SPEC QL NAA+PROBE: NOT DETECTED
SPECIMEN TYPE: ABNORMAL
T PALLIDUM AB SER QL: NONREACTIVE

## 2025-05-22 ENCOUNTER — RESULTS FOLLOW-UP (OUTPATIENT)
Dept: FAMILY MEDICINE | Facility: OTHER | Age: 35
End: 2025-05-22

## 2025-05-22 ENCOUNTER — OFFICE VISIT (OUTPATIENT)
Dept: FAMILY MEDICINE | Facility: OTHER | Age: 35
End: 2025-05-22
Attending: FAMILY MEDICINE
Payer: COMMERCIAL

## 2025-05-22 VITALS
HEIGHT: 65 IN | DIASTOLIC BLOOD PRESSURE: 66 MMHG | HEART RATE: 70 BPM | OXYGEN SATURATION: 98 % | TEMPERATURE: 97.1 F | BODY MASS INDEX: 29.16 KG/M2 | SYSTOLIC BLOOD PRESSURE: 118 MMHG | WEIGHT: 175 LBS

## 2025-05-22 DIAGNOSIS — Z13.220 LIPID SCREENING: ICD-10-CM

## 2025-05-22 DIAGNOSIS — F32.81 PMDD (PREMENSTRUAL DYSPHORIC DISORDER): ICD-10-CM

## 2025-05-22 DIAGNOSIS — B00.9 HSV-1 (HERPES SIMPLEX VIRUS 1) INFECTION: ICD-10-CM

## 2025-05-22 DIAGNOSIS — Z00.00 ROUTINE GENERAL MEDICAL EXAMINATION AT A HEALTH CARE FACILITY: Primary | ICD-10-CM

## 2025-05-22 LAB
ALBUMIN SERPL BCG-MCNC: 4.6 G/DL (ref 3.5–5.2)
ALP SERPL-CCNC: 53 U/L (ref 40–150)
ALT SERPL W P-5'-P-CCNC: 14 U/L (ref 0–50)
ANION GAP SERPL CALCULATED.3IONS-SCNC: 9 MMOL/L (ref 7–15)
AST SERPL W P-5'-P-CCNC: 23 U/L (ref 0–45)
BILIRUB SERPL-MCNC: 0.9 MG/DL
BUN SERPL-MCNC: 7.5 MG/DL (ref 6–20)
CALCIUM SERPL-MCNC: 9.4 MG/DL (ref 8.8–10.4)
CHLORIDE SERPL-SCNC: 104 MMOL/L (ref 98–107)
CHOLEST SERPL-MCNC: 178 MG/DL
CREAT SERPL-MCNC: 0.72 MG/DL (ref 0.51–0.95)
EGFRCR SERPLBLD CKD-EPI 2021: >90 ML/MIN/1.73M2
FASTING STATUS PATIENT QL REPORTED: NO
FASTING STATUS PATIENT QL REPORTED: NO
GLUCOSE SERPL-MCNC: 94 MG/DL (ref 70–99)
HCO3 SERPL-SCNC: 25 MMOL/L (ref 22–29)
HDLC SERPL-MCNC: 40 MG/DL
LDLC SERPL CALC-MCNC: 121 MG/DL
NONHDLC SERPL-MCNC: 138 MG/DL
POTASSIUM SERPL-SCNC: 4 MMOL/L (ref 3.4–5.3)
PROT SERPL-MCNC: 7.4 G/DL (ref 6.4–8.3)
SODIUM SERPL-SCNC: 138 MMOL/L (ref 135–145)
TRIGL SERPL-MCNC: 87 MG/DL

## 2025-05-22 RX ORDER — VALACYCLOVIR HYDROCHLORIDE 1 G/1
1000 TABLET, FILM COATED ORAL 3 TIMES DAILY
Qty: 21 TABLET | Refills: 0 | Status: SHIPPED | OUTPATIENT
Start: 2025-05-22 | End: 2025-05-29

## 2025-05-22 SDOH — HEALTH STABILITY: PHYSICAL HEALTH: ON AVERAGE, HOW MANY DAYS PER WEEK DO YOU ENGAGE IN MODERATE TO STRENUOUS EXERCISE (LIKE A BRISK WALK)?: 2 DAYS

## 2025-05-22 ASSESSMENT — ANXIETY QUESTIONNAIRES
GAD7 TOTAL SCORE: 18
8. IF YOU CHECKED OFF ANY PROBLEMS, HOW DIFFICULT HAVE THESE MADE IT FOR YOU TO DO YOUR WORK, TAKE CARE OF THINGS AT HOME, OR GET ALONG WITH OTHER PEOPLE?: VERY DIFFICULT
GAD7 TOTAL SCORE: 18
1. FEELING NERVOUS, ANXIOUS, OR ON EDGE: NEARLY EVERY DAY
4. TROUBLE RELAXING: NEARLY EVERY DAY
2. NOT BEING ABLE TO STOP OR CONTROL WORRYING: NEARLY EVERY DAY
GAD7 TOTAL SCORE: 18
7. FEELING AFRAID AS IF SOMETHING AWFUL MIGHT HAPPEN: NEARLY EVERY DAY
5. BEING SO RESTLESS THAT IT IS HARD TO SIT STILL: NOT AT ALL
3. WORRYING TOO MUCH ABOUT DIFFERENT THINGS: NEARLY EVERY DAY
IF YOU CHECKED OFF ANY PROBLEMS ON THIS QUESTIONNAIRE, HOW DIFFICULT HAVE THESE PROBLEMS MADE IT FOR YOU TO DO YOUR WORK, TAKE CARE OF THINGS AT HOME, OR GET ALONG WITH OTHER PEOPLE: VERY DIFFICULT
6. BECOMING EASILY ANNOYED OR IRRITABLE: NEARLY EVERY DAY
7. FEELING AFRAID AS IF SOMETHING AWFUL MIGHT HAPPEN: NEARLY EVERY DAY

## 2025-05-22 ASSESSMENT — SOCIAL DETERMINANTS OF HEALTH (SDOH): HOW OFTEN DO YOU GET TOGETHER WITH FRIENDS OR RELATIVES?: TWICE A WEEK

## 2025-05-22 ASSESSMENT — PAIN SCALES - GENERAL: PAINLEVEL_OUTOF10: NO PAIN (0)

## 2025-05-22 ASSESSMENT — PATIENT HEALTH QUESTIONNAIRE - PHQ9
10. IF YOU CHECKED OFF ANY PROBLEMS, HOW DIFFICULT HAVE THESE PROBLEMS MADE IT FOR YOU TO DO YOUR WORK, TAKE CARE OF THINGS AT HOME, OR GET ALONG WITH OTHER PEOPLE: VERY DIFFICULT
SUM OF ALL RESPONSES TO PHQ QUESTIONS 1-9: 9
SUM OF ALL RESPONSES TO PHQ QUESTIONS 1-9: 9

## 2025-05-22 NOTE — PROGRESS NOTES
"Preventive Care Visit  [facility]  Maximus Kemp MD, Family Medicine  May 22, 2025      Assessment & Plan     Lipid screening  Update and follow.    - Comprehensive metabolic panel; Future  - Lipid Profile; Future    PMDD (premenstrual dysphoric disorder)  Restart sertraline.  Nice talk on risk/benefit and she would like to restart.    - sertraline (ZOLOFT) 50 MG tablet; Take 1 tablet (50 mg) by mouth daily.    Routine general medical examination at a health care facility  Doing well.  Update pap.    - HPV and Gynecologic Cytology Panel    HSV-1 (herpes simplex virus 1) infection  Nice review of this.  She had a swab that was positive.  Valtrex to have on hand.  I reassured her overall.    - valACYclovir (VALTREX) 1000 mg tablet; Take 1 tablet (1,000 mg) by mouth 3 times daily for 7 days.            BMI  Estimated body mass index is 29.12 kg/m  as calculated from the following:    Height as of this encounter: 1.651 m (5' 5\").    Weight as of this encounter: 79.4 kg (175 lb).       Counseling  Appropriate preventive services were addressed with this patient via screening, questionnaire, or discussion as appropriate for fall prevention, nutrition, physical activity, Tobacco-use cessation, social engagement, weight loss and cognition.  Checklist reviewing preventive services available has been given to the patient.  Reviewed patient's diet, addressing concerns and/or questions.   She is at risk for lack of exercise and has been provided with information to increase physical activity for the benefit of her well-being.   She is at risk for psychosocial distress and has been provided with information to reduce risk.   The patient's PHQ-9 score is consistent with mild depression. She was provided with information regarding depression.           Allie Pagan is a 34 year old, presenting for the following:  Physical        5/22/2025     9:29 AM   Additional Questions   Roomed by Padmini HARPER   "       Advance Care Planning    Discussed advance care planning with patient; however, patient declined at this time.        2025   General Health   How would you rate your overall physical health? Good   Feel stress (tense, anxious, or unable to sleep) Very much   (!) STRESS CONCERN      2025   Nutrition   Three or more servings of calcium each day? Yes   Diet: Regular (no restrictions)   How many servings of fruit and vegetables per day? (!) 2-3   How many sweetened beverages each day? 0-1         2025   Exercise   Days per week of moderate/strenous exercise 2 days   (!) EXERCISE CONCERN      2025   Social Factors   Frequency of gathering with friends or relatives Twice a week   Worry food won't last until get money to buy more No   Food not last or not have enough money for food? No   Do you have housing? (Housing is defined as stable permanent housing and does not include staying outside in a car, in a tent, in an abandoned building, in an overnight shelter, or couch-surfing.) Yes   Are you worried about losing your housing? No   Lack of transportation? No   Unable to get utilities (heat,electricity)? No         2025   Dental   Dentist two times every year? Yes       Today's PHQ-9 Score:       2025     9:20 AM   PHQ-9 SCORE   PHQ-9 Total Score MyChart 9 (Mild depression)   PHQ-9 Total Score 9        Patient-reported         2025   Substance Use   Alcohol more than 3/day or more than 7/wk No   Do you use any other substances recreationally? No     Social History     Tobacco Use    Smoking status: Former     Current packs/day: 0.00     Average packs/day: 0.5 packs/day for 15.0 years (7.5 ttl pk-yrs)     Types: Cigarettes     Start date: 2006     Quit date: 2020     Years since quittin.4    Smokeless tobacco: Never   Substance Use Topics    Alcohol use: No     Alcohol/week: 5.0 standard drinks of alcohol     Types: 6 Cans of beer per week     Comment: occasionally  when she goes out' past hx heavier consumption    Drug use: No          Mammogram Screening - Patient under 40 years of age: Routine Mammogram Screening not recommended.         5/22/2025   STI Screening   New sexual partner(s) since last STI/HIV test? No     History of abnormal Pap smear: YES - reflected in Problem List and Health Maintenance accordingly        Latest Ref Rng & Units 10/17/2023     9:21 AM 10/14/2022     1:40 PM 2/25/2022     9:39 AM   PAP / HPV   PAP  Negative for Intraepithelial Lesion or Malignancy (NILM)  Negative for Intraepithelial Lesion or Malignancy (NILM)  Low-grade squamous intraepithelial lesion (LSIL) encompassing HPV/mild dysplasia/CIN1    HPV 16 DNA Negative Negative  Negative  Negative    HPV 18 DNA Negative Negative  Negative  Negative    Other HR HPV Negative Negative  Positive  Positive            5/22/2025   Contraception/Family Planning   Questions about contraception or family planning No        Reviewed and updated as needed this visit by Provider                    Past Medical History:   Diagnosis Date    Acute pancreatitis child    post treuma, from a fall    Autosomal dominant polycystic kidney disease 10/03/2013    Hepatitis remote    transient, probably from alcohol    Infection due to 2019 novel coronavirus 05/10/2022    Introital dyspareunia 07/18/2017    Moderate episode of recurrent major depressive disorder (H) 08/23/2022    Notalgia 08/25/2006    Overview:  IMO Update 10/11    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 06/04/2008    Papanicolaou smear of cervix with low grade squamous intraepithelial lesion (LGSIL) 08/25/2015    5/15.  [facility].   LGSIL, + HR HPV. F/u pap pp.     Pes planus 08/25/2006    Overview:  IMO Update 10 2016    Polycystic kidney disease 05/27/2015    Retained complete placenta 01/04/2016    Removed manually and currettaged. No excess blood loss noted     Scoliosis (and kyphoscoliosis), idiopathic 07/26/2005    Overview:  Mild  Replacing  "diagnoses that were inactivated after the 10/1/2021 regulatory import.    Tobacco abuse      Past Surgical History:   Procedure Laterality Date    ESOPHAGOSCOPY, GASTROSCOPY, DUODENOSCOPY (EGD), COMBINED N/A 6/10/2021    Procedure: upper endoscopy with biopsy;  Surgeon: Arturo Silva MD;  Location: [facility]    KNEE SURGERY      LAPAROSCOPIC APPENDECTOMY  4/23/2013    Procedure: LAPAROSCOPIC APPENDECTOMY;  LAPAROSCOPIC APPENDECTOMY;  Surgeon: Paula Roberts MD;  Location: [facility]         Review of Systems  CONSTITUTIONAL: NEGATIVE for fever, chills, change in weight  INTEGUMENTARY/SKIN: NEGATIVE for worrisome rashes, moles or lesions  EYES: NEGATIVE for vision changes or irritation  ENT/MOUTH: NEGATIVE for ear, mouth and throat problems  RESP: NEGATIVE for significant cough or SOB  BREAST: NEGATIVE for masses, tenderness or discharge  CV: NEGATIVE for chest pain, palpitations or peripheral edema  GI: NEGATIVE for nausea, abdominal pain, heartburn, or change in bowel habits  : NEGATIVE for frequency, dysuria, or hematuria  MUSCULOSKELETAL: NEGATIVE for significant arthralgias or myalgia  NEURO: NEGATIVE for weakness, dizziness or paresthesias  ENDOCRINE: NEGATIVE for temperature intolerance, skin/hair changes  HEME: NEGATIVE for bleeding problems  PSYCHIATRIC: NEGATIVE for changes in mood or affect     Objective    Exam  /66   Pulse 70   Temp 97.1  F (36.2  C) (Tympanic)   Ht 1.651 m (5' 5\")   Wt 79.4 kg (175 lb)   SpO2 98%   BMI 29.12 kg/m     Estimated body mass index is 29.12 kg/m  as calculated from the following:    Height as of this encounter: 1.651 m (5' 5\").    Weight as of this encounter: 79.4 kg (175 lb).    Physical Exam  GENERAL: alert and no distress  EYES: Eyes grossly normal to inspection, PERRL and conjunctivae and sclerae normal  HENT: ear canals and TM's normal, nose and mouth without ulcers or lesions  NECK: no adenopathy, no asymmetry, masses, or scars  RESP: lungs clear to " auscultation - no rales, rhonchi or wheezes  BREAST: normal without masses, tenderness or nipple discharge and no palpable axillary masses or adenopathy  CV: regular rate and rhythm, normal S1 S2, no S3 or S4, no murmur, click or rub, no peripheral edema  ABDOMEN: soft, nontender, no hepatosplenomegaly, no masses and bowel sounds normal   (female) w/bimanual: normal female external genitalia, normal urethral meatus, normal vaginal mucosa, and normal cervix/adnexa/uterus without masses or discharge  MS: no gross musculoskeletal defects noted, no edema  SKIN: no suspicious lesions or rashes  NEURO: Normal strength and tone, mentation intact and speech normal  PSYCH: mentation appears normal, affect normal/bright    Annual labs pending.      The longitudinal plan of care for the diagnosis(es)/condition(s) as documented were addressed during this visit. Due to the added complexity in care, I will continue to support Latasha in the subsequent management and with ongoing continuity of care.        Signed Electronically by: Maximus Kemp MD

## 2025-05-27 LAB
HPV HR 12 DNA CVX QL NAA+PROBE: NEGATIVE
HPV16 DNA CVX QL NAA+PROBE: NEGATIVE
HPV18 DNA CVX QL NAA+PROBE: NEGATIVE
HUMAN PAPILLOMA VIRUS FINAL DIAGNOSIS: NORMAL

## 2025-05-29 LAB
BKR AP ASSOCIATED HPV REPORT: ABNORMAL
BKR LAB AP GYN ADEQUACY: ABNORMAL
BKR LAB AP GYN INTERPRETATION: ABNORMAL
BKR LAB AP PREVIOUS ABNORMAL: ABNORMAL
PATH REPORT.COMMENTS IMP SPEC: ABNORMAL
PATH REPORT.COMMENTS IMP SPEC: ABNORMAL
PATH REPORT.RELEVANT HX SPEC: ABNORMAL

## 2025-06-02 ENCOUNTER — TELEPHONE (OUTPATIENT)
Dept: FAMILY MEDICINE | Facility: OTHER | Age: 35
End: 2025-06-02

## 2025-06-02 DIAGNOSIS — R87.612 PAPANICOLAOU SMEAR OF CERVIX WITH LOW GRADE SQUAMOUS INTRAEPITHELIAL LESION (LGSIL): Primary | ICD-10-CM

## 2025-07-10 ENCOUNTER — OFFICE VISIT (OUTPATIENT)
Dept: OBGYN | Facility: OTHER | Age: 35
End: 2025-07-10
Attending: OBSTETRICS & GYNECOLOGY
Payer: COMMERCIAL

## 2025-07-10 VITALS — OXYGEN SATURATION: 99 % | HEART RATE: 76 BPM | DIASTOLIC BLOOD PRESSURE: 70 MMHG | SYSTOLIC BLOOD PRESSURE: 102 MMHG

## 2025-07-10 DIAGNOSIS — R87.612 LGSIL ON PAP SMEAR OF CERVIX: Primary | ICD-10-CM

## 2025-07-10 PROCEDURE — 88305 TISSUE EXAM BY PATHOLOGIST: CPT | Mod: 26 | Performed by: PATHOLOGY

## 2025-07-10 PROCEDURE — 88342 IMHCHEM/IMCYTCHM 1ST ANTB: CPT | Mod: 26 | Performed by: PATHOLOGY

## 2025-07-10 RX ORDER — IODINE AND POTASSIUM IODIDE 50; 100 MG/ML; MG/ML
LIQUID ORAL ONCE
Status: COMPLETED | OUTPATIENT
Start: 2025-07-10 | End: 2025-07-10

## 2025-07-10 RX ADMIN — IODINE AND POTASSIUM IODIDE 1 ML: 50; 100 LIQUID ORAL at 10:48

## 2025-07-10 NOTE — PROGRESS NOTES
Latasha Fisher is a 34 year old female P3   who presents for abnormal pap smear evaluation referred by Dr. Kemp.     Pap smear 1 months ago showed: LGSIL.  HPV neg The prior pap showed normal.   This will be her repeat colposcopy.  She has had previous colposcopy 3 yrs ago for LGSIL/+ HR HPV (other) with biopsies negative for dysplasia.     No LMP recorded.   Past GYN history:  No STD history, HPV, and HSV  Prior cervical/vaginal disease: HPV related changes.  Prior cervical treatment: no treatment.  Tobacco use:No    PMH, Fam Hx, Soc Hx Reviewed.    ROS:  Neg GI/    PROCEDURE:  Before the procedure, it was ensured that the patient was educated regarding the nature of her findings to date, the implications, and what was to be done. She has been made aware of the role of HPV, the natural history of infection, ways to minimize her future risk, the effect of HPV on the cervix, and treatment options available should they be indicated.  The details of the colposcopic procedure were reviewed.  All questions were answered before proceeding, and informed consent was therefore obtained.    Speculum placed in vagina and excellent visualization of cervix   acheived, cervix swabbed x 3 with acetic acid solution.    FINDINGS:  EXAM:  Blood pressure 102/70, pulse 76, SpO2 99%, not currently breastfeeding.  BMI= There is no height or weight on file to calculate BMI.  No LMP recorded.  General - pleasant female in no acute distress.  Neurological - alert and oriented X 3  Psychiatric - normal mood and affect    Pelvic-  Cervix: acetowhitening noted 11-1:00      Pap repeated?:  No  SCJ seen?:  yes    ECC done?:  No  Lugol's solution used?:  Yes   Satisfactory examination?:  yes      ASSESSMENT: cervicitis/LGSIL.  R/o high grade DPA    PLAN: specimens labelled and sent to Pathology, will base further treatment on Pathology findings, treatment options discussed with patient, post biopsy instructions given to patient, and call  to discuss Pathology results    Discussed cervical dysplasia/HPV, importance of follow up.  Handouts and my card and phone number given to patient.  She will call if she has not heard results within 2 weeks.    Abiodun Johnson MD

## 2025-07-14 ENCOUNTER — E-VISIT (OUTPATIENT)
Dept: URGENT CARE | Facility: CLINIC | Age: 35
End: 2025-07-14

## 2025-07-14 ENCOUNTER — MYC MEDICAL ADVICE (OUTPATIENT)
Dept: FAMILY MEDICINE | Facility: OTHER | Age: 35
End: 2025-07-14

## 2025-07-14 DIAGNOSIS — B00.9 HSV-1 (HERPES SIMPLEX VIRUS 1) INFECTION: ICD-10-CM

## 2025-07-14 DIAGNOSIS — J02.9 SORE THROAT: Primary | ICD-10-CM

## 2025-07-14 RX ORDER — VALACYCLOVIR HYDROCHLORIDE 1 G/1
1000 TABLET, FILM COATED ORAL 3 TIMES DAILY
Qty: 21 TABLET | Refills: 0 | Status: SHIPPED | OUTPATIENT
Start: 2025-07-14

## 2025-07-14 NOTE — PATIENT INSTRUCTIONS
Benson Pagan,    After reviewing your responses, I would like you to come in for a swab to make sure we treat you correctly. This swab is to evaluate you for possible Strep Throat, and should be scheduled for today or tomorrow. Please use the Schedule Now button in [facility] to schedule your swab. Otherwise, click this link to schedule a lab only appointment.    Lab appointments are not available at most locations on the weekends. If no Lab Only appointment is available, you should be seen in any of our convenient Urgent Care Centers for an in person visit, which can be found on our website here.    You will receive instructions with your results in [facility] once they are available.     If your symptoms worsen, you develop difficulty breathing, difficulty with drinking enough to stay hydrated, difficulty swallowing your saliva or have fevers for more than 5 days, please contact your primary care provider for an appointment or visit an Urgent Care Center to be seen.      Thanks again for choosing us as your health care partner.   SHELLI Martínez CNP

## 2025-07-15 ENCOUNTER — HOSPITAL ENCOUNTER (EMERGENCY)
Facility: HOSPITAL | Age: 35
Discharge: HOME | End: 2025-07-15
Attending: PHYSICIAN ASSISTANT
Payer: COMMERCIAL

## 2025-07-15 VITALS
DIASTOLIC BLOOD PRESSURE: 74 MMHG | WEIGHT: 175 LBS | HEART RATE: 71 BPM | BODY MASS INDEX: 29.12 KG/M2 | SYSTOLIC BLOOD PRESSURE: 111 MMHG | OXYGEN SATURATION: 98 % | RESPIRATION RATE: 16 BRPM | TEMPERATURE: 98 F

## 2025-07-15 DIAGNOSIS — J02.0 STREP PHARYNGITIS: ICD-10-CM

## 2025-07-15 LAB
PATH REPORT.COMMENTS IMP SPEC: NORMAL
PATH REPORT.COMMENTS IMP SPEC: NORMAL
PATH REPORT.FINAL DX SPEC: NORMAL
PATH REPORT.GROSS SPEC: NORMAL
PATH REPORT.MICROSCOPIC SPEC OTHER STN: NORMAL
PATH REPORT.MICROSCOPIC SPEC OTHER STN: NORMAL
PATH REPORT.RELEVANT HX SPEC: NORMAL
PHOTO IMAGE: NORMAL
S PYO DNA THROAT QL NAA+PROBE: DETECTED

## 2025-07-15 PROCEDURE — G0463 HOSPITAL OUTPT CLINIC VISIT: HCPCS | Performed by: PHYSICIAN ASSISTANT

## 2025-07-15 PROCEDURE — 99213 OFFICE O/P EST LOW 20 MIN: CPT | Performed by: PHYSICIAN ASSISTANT

## 2025-07-15 PROCEDURE — 87651 STREP A DNA AMP PROBE: CPT | Performed by: PHYSICIAN ASSISTANT

## 2025-07-15 PROCEDURE — 250N000009 HC RX 250: Performed by: PHYSICIAN ASSISTANT

## 2025-07-15 RX ORDER — DEXAMETHASONE SODIUM PHOSPHATE 10 MG/ML
10 INJECTION INTRAMUSCULAR; INTRAVENOUS ONCE
Status: COMPLETED | OUTPATIENT
Start: 2025-07-15 | End: 2025-07-15

## 2025-07-15 RX ORDER — PENICILLIN V POTASSIUM 500 MG/1
500 TABLET, FILM COATED ORAL 2 TIMES DAILY
Qty: 20 TABLET | Refills: 0 | Status: SHIPPED | OUTPATIENT
Start: 2025-07-15 | End: 2025-07-25

## 2025-07-15 RX ADMIN — DEXAMETHASONE SODIUM PHOSPHATE 10 MG: 10 INJECTION INTRAMUSCULAR; INTRAVENOUS at 10:45

## 2025-07-15 ASSESSMENT — ENCOUNTER SYMPTOMS
CHILLS: 0
DIARRHEA: 0
SORE THROAT: 1
COUGH: 0
NAUSEA: 0
DIAPHORESIS: 0
VOMITING: 0
FEVER: 0

## 2025-07-15 ASSESSMENT — ACTIVITIES OF DAILY LIVING (ADL): ADLS_ACUITY_SCORE: 48

## 2025-07-15 NOTE — ED TRIAGE NOTES
Pt presents with sore throat and bilateral ear pain x4 days. PT denied fever, n/v and diarrhea. PT has been taking ibuprofen and tylenol.

## 2025-07-15 NOTE — ED PROVIDER NOTES
History     Chief Complaint   Patient presents with    Pharyngitis     HPI  Latasha Fisher is a 34 year old female who Sore throat presents with 4 days of sore throat and bilateral ear pain.  No fevers chills sweats coughing nausea vomiting or diarrhea.    Allergies:  No Known Allergies    Problem List:    Patient Active Problem List    Diagnosis Date Noted    HSV-1 (herpes simplex virus 1) infection 05/22/2025     Priority: Medium    Moderate episode of recurrent major depressive disorder (H) 08/23/2022     Priority: Medium    ACP (advance care planning) 09/27/2017     Priority: Medium     Advance Care Planning 9/27/2017: ACP Review of Chart / Resources Provided:  Reviewed chart for advance care plan.  Latasha Fisher has no plan or code status on file. Discussed available resources and provided with information.   Added by Ania Benitez            Polycystic kidney disease 05/27/2015     Priority: Medium    Autosomal dominant polycystic kidney disease 10/03/2013     Priority: Medium    Notalgia 08/25/2006     Priority: Medium     Overview:   IMO Update 10/11      Pes planus 08/25/2006     Priority: Medium     Overview:   IMO Update 10 2016      Scoliosis (and kyphoscoliosis), idiopathic 07/26/2005     Priority: Medium     Overview:   Mild  Replacing diagnoses that were inactivated after the 10/1/2021 regulatory import.          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 06/04/2008    Papanicolaou smear of cervix with low grade squamous intraepithelial lesion (LGSIL) 08/25/2015    Pes planus 08/25/2006    Polycystic kidney disease 05/27/2015    Retained  complete placenta 2016    Scoliosis (and kyphoscoliosis), idiopathic 2005    Tobacco abuse        Past Surgical History:    Past Surgical History:   Procedure Laterality Date    ESOPHAGOSCOPY, GASTROSCOPY, DUODENOSCOPY (EGD), COMBINED N/A 6/10/2021    Procedure: upper endoscopy with biopsy;  Surgeon: Arturo Silva MD;  Location: [facility]    KNEE SURGERY      LAPAROSCOPIC APPENDECTOMY  2013    Procedure: LAPAROSCOPIC APPENDECTOMY;  LAPAROSCOPIC APPENDECTOMY;  Surgeon: Paula Roberts MD;  Location: [facility]       Family History:    Family History   Problem Relation Age of Onset    Thyroid Disease Mother     Diabetes Father     Asthma Sister     Attention Deficit Disorder Brother        Social History:  Marital Status:  Single [1]  Social History     Tobacco Use    Smoking status: Former     Current packs/day: 0.00     Average packs/day: 0.5 packs/day for 15.0 years (7.5 ttl pk-yrs)     Types: Cigarettes     Start date: 2006     Quit date: 2020     Years since quittin.5    Smokeless tobacco: Never   Substance Use Topics    Alcohol use: No     Alcohol/week: 5.0 standard drinks of alcohol     Types: 6 Cans of beer per week     Comment: occasionally when she goes out' past hx heavier consumption    Drug use: No        Medications:    penicillin V (VEETID) 500 MG tablet  sertraline (ZOLOFT) 50 MG tablet  valACYclovir (VALTREX) 1000 mg tablet          Review of Systems   Constitutional:  Negative for chills, diaphoresis and fever.   HENT:  Positive for ear pain and sore throat. Negative for ear discharge.    Respiratory:  Negative for cough.    Gastrointestinal:  Negative for diarrhea, nausea and vomiting.       Physical Exam   BP: 111/74  Pulse: 71  Temp: 98  F (36.7  C)  Resp: 16  Weight: 79.4 kg (175 lb)  SpO2: 98 %      Physical Exam  Vitals and nursing note reviewed.   Constitutional:       Appearance: She is well-developed.   HENT:      Head: Normocephalic.      Right Ear: Tympanic  membrane and ear canal normal.      Left Ear: Tympanic membrane and ear canal normal.      Nose: No congestion or rhinorrhea.      Mouth/Throat:      Mouth: No oral lesions.      Pharynx: Posterior oropharyngeal erythema present. No oropharyngeal exudate or uvula swelling.      Tonsils: No tonsillar exudate or tonsillar abscesses. 2+ on the right. 2+ on the left.   Cardiovascular:      Rate and Rhythm: Normal rate and regular rhythm.      Heart sounds: Normal heart sounds. No murmur heard.     No friction rub. No gallop.   Pulmonary:      Effort: Pulmonary effort is normal.      Breath sounds: Normal breath sounds. No wheezing, rhonchi or rales.   Abdominal:      Comments: No palpable spleen.   Neurological:      Mental Status: She is alert.   Psychiatric:         Mood and Affect: Mood normal.         ED Course        Procedures      Recent Results (from the past 24 hours)   Group A Streptococcus PCR Throat Swab    Specimen: Throat; Swab   Result Value Ref Range    Group A strep by PCR Detected (A) Not Detected    Narrative    The Xpert Xpress Strep A test, performed on the [employer], is a rapid, qualitative in vitro diagnostic test for the detection of Streptococcus pyogenes (Group A ß-hemolytic Streptococcus, Strep A) in throat swab specimens from patients with signs and symptoms of pharyngitis. The Xpert Xpress Strep A test can be used as an aid in the diagnosis of Group A Streptococcal pharyngitis. The assay is not intended to monitor treatment for Group A Streptococcus infections. The Xpert Xpress Strep A test utilizes an automated real-time polymerase chain reaction (PCR) to detect Streptococcus pyogenes DNA.       Medications   dexAMETHasone (DECADRON) injectable solution used ORALLY 10 mg (has no administration in time range)       Assessments & Plan (with Medical Decision Making)     I have reviewed the nursing notes.    I have reviewed the findings, diagnosis, plan and need for follow  up with the patient.    Medical Decision Making    Patient's rapid strep test came back positive, we will give her a dose of Decadron here and start penicillin.  No sign of peritonsillar abscess.        New Prescriptions    PENICILLIN V (VEETID) 500 MG TABLET    Take 1 tablet (500 mg) by mouth 2 times daily for 10 days.       Final diagnoses:   Strep pharyngitis       7/15/2025   [facility]       Yared Tristan PA-C  07/15/25 [id number]

## 2025-07-15 NOTE — DISCHARGE INSTRUCTIONS
Take the antibiotics, limit ibuprofen to 800 mg every 6 hours, can add tylenol as well. If things get worse come back.

## (undated) DEVICE — SYRINGE-30CC SLIP TIP

## (undated) DEVICE — CONNECTOR-ERBEFLO 2 PORT

## (undated) DEVICE — MOUTHPIECE W/GUARD FOR ENDOSCOPY

## (undated) DEVICE — IRRIGATION-H2O 1000ML

## (undated) DEVICE — CANISTER-SUCTION 2000CC

## (undated) DEVICE — FORCEP-COLON BIOPSY STD W/NEEDLE 160CM

## (undated) DEVICE — TUBING-SUCTION 20FT

## (undated) DEVICE — LUBRICANT JELLY 2OZ. TUBE

## (undated) RX ORDER — LIDOCAINE HYDROCHLORIDE 20 MG/ML
INJECTION, SOLUTION EPIDURAL; INFILTRATION; INTRACAUDAL; PERINEURAL
Status: DISPENSED
Start: 2021-06-10

## (undated) RX ORDER — PROPOFOL 10 MG/ML
INJECTION, EMULSION INTRAVENOUS
Status: DISPENSED
Start: 2021-06-10

## (undated) RX ORDER — FENTANYL CITRATE 50 UG/ML
INJECTION, SOLUTION INTRAMUSCULAR; INTRAVENOUS
Status: DISPENSED
Start: 2021-06-10

## (undated) RX ORDER — IODINE AND POTASSIUM IODIDE 50; 100 MG/ML; MG/ML
LIQUID ORAL
Status: DISPENSED
Start: 2025-07-10